# Patient Record
Sex: FEMALE | Race: WHITE | NOT HISPANIC OR LATINO | Employment: OTHER | URBAN - METROPOLITAN AREA
[De-identification: names, ages, dates, MRNs, and addresses within clinical notes are randomized per-mention and may not be internally consistent; named-entity substitution may affect disease eponyms.]

---

## 2017-01-22 RX ORDER — LORAZEPAM 1 MG/1
TABLET ORAL
Qty: 60 TABLET | Refills: 0 | Status: SHIPPED | OUTPATIENT
Start: 2017-01-22 | End: 2017-03-23 | Stop reason: SDUPTHER

## 2017-02-12 RX ORDER — ALPRAZOLAM 0.25 MG/1
TABLET ORAL
Qty: 60 TABLET | Refills: 0 | Status: SHIPPED | OUTPATIENT
Start: 2017-02-12 | End: 2017-04-21 | Stop reason: SDUPTHER

## 2017-03-05 ENCOUNTER — PATIENT MESSAGE (OUTPATIENT)
Dept: FAMILY MEDICINE | Facility: CLINIC | Age: 82
End: 2017-03-05

## 2017-03-06 ENCOUNTER — OFFICE VISIT (OUTPATIENT)
Dept: FAMILY MEDICINE | Facility: CLINIC | Age: 82
End: 2017-03-06
Payer: MEDICARE

## 2017-03-06 VITALS
DIASTOLIC BLOOD PRESSURE: 74 MMHG | WEIGHT: 118.38 LBS | HEART RATE: 73 BPM | HEIGHT: 61 IN | SYSTOLIC BLOOD PRESSURE: 128 MMHG | TEMPERATURE: 98 F | OXYGEN SATURATION: 98 % | BODY MASS INDEX: 22.35 KG/M2

## 2017-03-06 DIAGNOSIS — R42 DIZZY: Primary | ICD-10-CM

## 2017-03-06 DIAGNOSIS — F32.A DEPRESSION, UNSPECIFIED DEPRESSION TYPE: ICD-10-CM

## 2017-03-06 PROCEDURE — 99213 OFFICE O/P EST LOW 20 MIN: CPT | Mod: S$GLB,,, | Performed by: FAMILY MEDICINE

## 2017-03-06 RX ORDER — MIRTAZAPINE 15 MG/1
TABLET, FILM COATED ORAL
Qty: 30 TABLET | Refills: 11 | Status: SHIPPED | OUTPATIENT
Start: 2017-03-06 | End: 2017-06-27

## 2017-03-10 ENCOUNTER — PATIENT MESSAGE (OUTPATIENT)
Dept: FAMILY MEDICINE | Facility: CLINIC | Age: 82
End: 2017-03-10

## 2017-03-11 NOTE — PROGRESS NOTES
Patient ID: Irma Pedroza is a 89 y.o. female.    Chief Complaint: Diabetes; Dizziness; and Depression    HPI       Irma Pedroza is a 89 y.o. female here BC felt dizzy for a day or two and feels better now.  Hx of vertigo occ.  Pt with depression and decreased appetite.  Pt with fear of hypoglycemia.        Review of Symptoms    Constitutional  Neg activity change, No chills/ fever   Resp  Neg hemoptysis, stridor, choking  CVS  Neg chest pain, palpitations    Physical Exam    Constitutional:   Oriented to person, place, and time.appears well-developed and well-nourished.   No distress.     HENT:   Head: Normocephalic and atraumatic.     Right Ear: Tympanic membrane, external ear and ear canal normal     Left Ear: Tympanic membrane, external ear and ear canal normal    Nose: External Normal. Normal turbinates, No rhinorrhea     Mouth/Throat: Uvula is midline, oropharynx is clear and moist and mucous membranes are normal.     Neck: supple no anterior cervical adenopathy(Unless checked) (except below)  [] Anterior Cervical Adenopathy    Carotid artery:  Bruit    NEG []   POS[]    Eyes:   Conjunctivae are normal. Right eye exhibits no discharge. Left eye exhibits no discharge. No scleral icterus. No periorbital edema       Cardiovascular:  Regular rate, Regular rhythm     No extrasystole  Normal S1-S2    Pulmonary/Chest:   Normal effort and breath sounds.  No wheezing, rhonchi or rales.      Musculoskeletal:  No edema. No obvious deformity No waisting     Neurological:   Alert and oriented to person, place, and time. Coordination normal.     Skin:   Skin is warm and dry.   No diaphoresis.   No rash noted.    Psychiatric: Normal mood and affect. Behavior is normal. Judgment and thought content normal.       Assessment / Plan:      ICD-10-CM ICD-9-CM   1. Dizzy R42 780.4   2. Depression, unspecified depression type F32.9 311     Dizzy    Depression, unspecified depression type    Other orders  -     canagliflozin  (INVOKANA) 300 mg Tab tablet; Take 1 tablet (300 mg total) by mouth once daily.  Dispense: 30 tablet; Refill: 2  -     mirtazapine (REMERON) 15 MG tablet; Use one each night for sleep and depression  Dispense: 30 tablet; Refill: 11      Reduce lantus and start invokana

## 2017-03-16 ENCOUNTER — PATIENT MESSAGE (OUTPATIENT)
Dept: FAMILY MEDICINE | Facility: CLINIC | Age: 82
End: 2017-03-16

## 2017-03-16 DIAGNOSIS — R53.81 DEBILITY: ICD-10-CM

## 2017-03-16 DIAGNOSIS — R53.1 WEAKNESS: Primary | ICD-10-CM

## 2017-03-17 ENCOUNTER — PATIENT MESSAGE (OUTPATIENT)
Dept: FAMILY MEDICINE | Facility: CLINIC | Age: 82
End: 2017-03-17

## 2017-03-23 RX ORDER — LORAZEPAM 1 MG/1
TABLET ORAL
Qty: 60 TABLET | Refills: 0 | Status: SHIPPED | OUTPATIENT
Start: 2017-03-23 | End: 2017-05-22 | Stop reason: SDUPTHER

## 2017-04-04 RX ORDER — LINAGLIPTIN 5 MG/1
TABLET, FILM COATED ORAL
Qty: 90 TABLET | Refills: 3 | Status: SHIPPED | OUTPATIENT
Start: 2017-04-04

## 2017-04-21 RX ORDER — ALPRAZOLAM 0.25 MG/1
TABLET ORAL
Qty: 60 TABLET | Refills: 0 | Status: SHIPPED | OUTPATIENT
Start: 2017-04-21 | End: 2017-06-21 | Stop reason: SDUPTHER

## 2017-05-22 RX ORDER — LORAZEPAM 1 MG/1
TABLET ORAL
Qty: 60 TABLET | Refills: 3 | Status: SHIPPED | OUTPATIENT
Start: 2017-05-22

## 2017-06-01 RX ORDER — CANAGLIFLOZIN 300 MG/1
TABLET, FILM COATED ORAL
Qty: 30 TABLET | Refills: 11 | Status: SHIPPED | OUTPATIENT
Start: 2017-06-01 | End: 2017-07-18

## 2017-06-21 ENCOUNTER — PATIENT MESSAGE (OUTPATIENT)
Dept: FAMILY MEDICINE | Facility: CLINIC | Age: 82
End: 2017-06-21

## 2017-06-21 DIAGNOSIS — E11.9 TYPE 2 DIABETES MELLITUS WITHOUT COMPLICATION, WITH LONG-TERM CURRENT USE OF INSULIN: ICD-10-CM

## 2017-06-21 DIAGNOSIS — Z79.4 TYPE 2 DIABETES MELLITUS WITHOUT COMPLICATION, WITH LONG-TERM CURRENT USE OF INSULIN: ICD-10-CM

## 2017-06-21 DIAGNOSIS — E03.9 HYPOTHYROIDISM (ACQUIRED): ICD-10-CM

## 2017-06-21 DIAGNOSIS — I10 ESSENTIAL HYPERTENSION: Primary | ICD-10-CM

## 2017-06-21 RX ORDER — ALPRAZOLAM 0.25 MG/1
TABLET ORAL
Qty: 60 TABLET | Refills: 0 | Status: SHIPPED | OUTPATIENT
Start: 2017-06-21 | End: 2017-07-21 | Stop reason: SDUPTHER

## 2017-06-22 DIAGNOSIS — Z79.4 DIABETES MELLITUS, TYPE II, INSULIN DEPENDENT: Primary | ICD-10-CM

## 2017-06-22 DIAGNOSIS — E11.9 DIABETES MELLITUS, TYPE II, INSULIN DEPENDENT: Primary | ICD-10-CM

## 2017-06-22 DIAGNOSIS — E11.9 DIABETES MELLITUS WITHOUT COMPLICATION: Primary | ICD-10-CM

## 2017-06-23 ENCOUNTER — LAB VISIT (OUTPATIENT)
Dept: LAB | Facility: HOSPITAL | Age: 82
End: 2017-06-23
Attending: FAMILY MEDICINE
Payer: MEDICARE

## 2017-06-23 DIAGNOSIS — Z79.4 DIABETES MELLITUS, TYPE II, INSULIN DEPENDENT: ICD-10-CM

## 2017-06-23 DIAGNOSIS — E03.9 HYPOTHYROIDISM (ACQUIRED): ICD-10-CM

## 2017-06-23 DIAGNOSIS — Z79.4 TYPE 2 DIABETES MELLITUS WITHOUT COMPLICATION, WITH LONG-TERM CURRENT USE OF INSULIN: ICD-10-CM

## 2017-06-23 DIAGNOSIS — I10 ESSENTIAL HYPERTENSION: ICD-10-CM

## 2017-06-23 DIAGNOSIS — E11.9 DIABETES MELLITUS, TYPE II, INSULIN DEPENDENT: ICD-10-CM

## 2017-06-23 DIAGNOSIS — E11.9 TYPE 2 DIABETES MELLITUS WITHOUT COMPLICATION, WITH LONG-TERM CURRENT USE OF INSULIN: ICD-10-CM

## 2017-06-23 LAB
ANION GAP SERPL CALC-SCNC: 10 MMOL/L
BUN SERPL-MCNC: 44 MG/DL
CALCIUM SERPL-MCNC: 9.3 MG/DL
CHLORIDE SERPL-SCNC: 108 MMOL/L
CHOLEST/HDLC SERPL: 3 {RATIO}
CO2 SERPL-SCNC: 24 MMOL/L
CREAT SERPL-MCNC: 0.78 MG/DL
EST. GFR  (AFRICAN AMERICAN): >60 ML/MIN/1.73 M^2
EST. GFR  (NON AFRICAN AMERICAN): >60 ML/MIN/1.73 M^2
ESTIMATED AVG GLUCOSE: 249 MG/DL
GLUCOSE SERPL-MCNC: 131 MG/DL
HBA1C MFR BLD HPLC: 10.3 %
HDL/CHOLESTEROL RATIO: 33.5 %
HDLC SERPL-MCNC: 179 MG/DL
HDLC SERPL-MCNC: 60 MG/DL
LDLC SERPL CALC-MCNC: 100.4 MG/DL
NONHDLC SERPL-MCNC: 119 MG/DL
POTASSIUM SERPL-SCNC: 4.2 MMOL/L
SODIUM SERPL-SCNC: 142 MMOL/L
T4 FREE SERPL-MCNC: 1.23 NG/DL
TRIGL SERPL-MCNC: 93 MG/DL
TSH SERPL DL<=0.005 MIU/L-ACNC: 1.8 UIU/ML

## 2017-06-23 PROCEDURE — 36415 COLL VENOUS BLD VENIPUNCTURE: CPT | Mod: PO

## 2017-06-23 PROCEDURE — 83036 HEMOGLOBIN GLYCOSYLATED A1C: CPT | Mod: PO

## 2017-06-23 PROCEDURE — 80048 BASIC METABOLIC PNL TOTAL CA: CPT | Mod: PO

## 2017-06-23 PROCEDURE — 80061 LIPID PANEL: CPT

## 2017-06-23 PROCEDURE — 84443 ASSAY THYROID STIM HORMONE: CPT

## 2017-06-23 PROCEDURE — 84439 ASSAY OF FREE THYROXINE: CPT

## 2017-06-27 ENCOUNTER — OFFICE VISIT (OUTPATIENT)
Dept: FAMILY MEDICINE | Facility: CLINIC | Age: 82
End: 2017-06-27
Payer: MEDICARE

## 2017-06-27 VITALS
TEMPERATURE: 98 F | DIASTOLIC BLOOD PRESSURE: 40 MMHG | SYSTOLIC BLOOD PRESSURE: 120 MMHG | OXYGEN SATURATION: 96 % | WEIGHT: 109.81 LBS | HEIGHT: 61 IN | BODY MASS INDEX: 20.73 KG/M2 | RESPIRATION RATE: 18 BRPM | HEART RATE: 70 BPM

## 2017-06-27 DIAGNOSIS — R63.4 WEIGHT LOSS: ICD-10-CM

## 2017-06-27 DIAGNOSIS — I10 ESSENTIAL HYPERTENSION: ICD-10-CM

## 2017-06-27 DIAGNOSIS — E03.9 HYPOTHYROIDISM (ACQUIRED): ICD-10-CM

## 2017-06-27 DIAGNOSIS — E11.9 TYPE 2 DIABETES MELLITUS WITHOUT COMPLICATION, WITH LONG-TERM CURRENT USE OF INSULIN: Primary | ICD-10-CM

## 2017-06-27 DIAGNOSIS — Z79.4 TYPE 2 DIABETES MELLITUS WITHOUT COMPLICATION, WITH LONG-TERM CURRENT USE OF INSULIN: Primary | ICD-10-CM

## 2017-06-27 PROCEDURE — 1159F MED LIST DOCD IN RCRD: CPT | Mod: S$GLB,,, | Performed by: FAMILY MEDICINE

## 2017-06-27 PROCEDURE — 1126F AMNT PAIN NOTED NONE PRSNT: CPT | Mod: S$GLB,,, | Performed by: FAMILY MEDICINE

## 2017-06-27 PROCEDURE — 99213 OFFICE O/P EST LOW 20 MIN: CPT | Mod: S$GLB,,, | Performed by: FAMILY MEDICINE

## 2017-06-28 ENCOUNTER — PATIENT MESSAGE (OUTPATIENT)
Dept: FAMILY MEDICINE | Facility: CLINIC | Age: 82
End: 2017-06-28

## 2017-06-30 ENCOUNTER — PATIENT MESSAGE (OUTPATIENT)
Dept: FAMILY MEDICINE | Facility: CLINIC | Age: 82
End: 2017-06-30

## 2017-07-01 ENCOUNTER — PATIENT MESSAGE (OUTPATIENT)
Dept: FAMILY MEDICINE | Facility: CLINIC | Age: 82
End: 2017-07-01

## 2017-07-01 DIAGNOSIS — Z79.4 TYPE 2 DIABETES MELLITUS WITHOUT COMPLICATION, WITH LONG-TERM CURRENT USE OF INSULIN: Primary | ICD-10-CM

## 2017-07-01 DIAGNOSIS — F41.9 ANXIETY: ICD-10-CM

## 2017-07-01 DIAGNOSIS — E11.9 TYPE 2 DIABETES MELLITUS WITHOUT COMPLICATION, WITH LONG-TERM CURRENT USE OF INSULIN: Primary | ICD-10-CM

## 2017-07-01 DIAGNOSIS — R26.9 GAIT DISTURBANCE: ICD-10-CM

## 2017-07-01 DIAGNOSIS — R63.4 WEIGHT LOSS: ICD-10-CM

## 2017-07-02 ENCOUNTER — PATIENT MESSAGE (OUTPATIENT)
Dept: FAMILY MEDICINE | Facility: CLINIC | Age: 82
End: 2017-07-02

## 2017-07-04 NOTE — PROGRESS NOTES
Patient ID: Irma Pedroza is a 89 y.o. female.    Chief Complaint: Follow-up    HPI      Irma Pedroza is a 89 y.o. female here following up on diabetes which is not fully controlled because hemoglobin A1c is elevated.  However patient has some episodes of hypoglycemia.  Not predicted easily.  Patient with significant weight loss recently.  Some more rapid weight loss can be associated same time she started Invokana.            Review of Symptoms    Constitutional  not really anytivity change, No chills /fever   Resp  Neg hemoptysis, stridor, choking  CVS  Neg chest pain, palpitations    Physical Exam    Constitutional:  Oriented to person, place, and time.appears well-developed and well-nourished.  No distress.    appears thinner     HENT  Head: Normocephalic and atraumatic  Right Ear: External ear normal.   Left Ear: External ear normal.   Nose: External nose normal.   Mouth:  Moist mucus membranes.    Eyes:  Conjunctivae are normal. Right eye exhibits no discharge.  Left eye exhibits no discharge. No scleral icterus.  No periorbital edema    Cardiovascular:  Regular rate, Regular rhythm     No extrasystole  Normal S1-S2      Pulmonary/Chest:   Normal effort and breath sounds.  No wheezing, rhonchi or rales.    Musculoskeletal:  No edema. No obvious deformity No waisting       Neurological:  Alert and oriented to person, place, and time.   Coordination normal.     Skin:   Skin is warm and dry.  No diaphoresis.   No rash noted.     Psychiatric: Normal mood and affect. Behavior is normal.  Judgment and thought content normal.       Assessment / Plan:      ICD-10-CM ICD-9-CM   1. Type 2 diabetes mellitus without complication, with long-term current use of insulin E11.9 250.00    Z79.4 V58.67   2. Hypothyroidism (acquired) E03.9 244.9   3. Essential hypertension I10 401.9   4. Weight loss R63.4 783.21     Type 2 diabetes mellitus without complication, with long-term current use of insulin    Hypothyroidism  (acquired)    Essential hypertension    Weight loss      Not sure if weight loss is due to diabetes out of control or Invokana-STOP INVOKANA for the time being follow-up in one month-weekly weights-add Glucerna between meals

## 2017-07-18 ENCOUNTER — OFFICE VISIT (OUTPATIENT)
Dept: FAMILY MEDICINE | Facility: CLINIC | Age: 82
End: 2017-07-18
Payer: MEDICARE

## 2017-07-18 VITALS
TEMPERATURE: 98 F | SYSTOLIC BLOOD PRESSURE: 128 MMHG | WEIGHT: 114.88 LBS | HEIGHT: 61 IN | OXYGEN SATURATION: 97 % | BODY MASS INDEX: 21.69 KG/M2 | DIASTOLIC BLOOD PRESSURE: 70 MMHG | HEART RATE: 65 BPM

## 2017-07-18 DIAGNOSIS — R63.4 WEIGHT LOSS: ICD-10-CM

## 2017-07-18 DIAGNOSIS — Z79.4 TYPE 2 DIABETES MELLITUS WITHOUT COMPLICATION, WITH LONG-TERM CURRENT USE OF INSULIN: Primary | ICD-10-CM

## 2017-07-18 DIAGNOSIS — E03.9 HYPOTHYROIDISM (ACQUIRED): ICD-10-CM

## 2017-07-18 DIAGNOSIS — E11.9 TYPE 2 DIABETES MELLITUS WITHOUT COMPLICATION, WITH LONG-TERM CURRENT USE OF INSULIN: Primary | ICD-10-CM

## 2017-07-18 DIAGNOSIS — F41.9 ANXIETY: ICD-10-CM

## 2017-07-18 PROCEDURE — 1159F MED LIST DOCD IN RCRD: CPT | Mod: S$GLB,,, | Performed by: FAMILY MEDICINE

## 2017-07-18 PROCEDURE — 1125F AMNT PAIN NOTED PAIN PRSNT: CPT | Mod: S$GLB,,, | Performed by: FAMILY MEDICINE

## 2017-07-18 PROCEDURE — 99213 OFFICE O/P EST LOW 20 MIN: CPT | Mod: S$GLB,,, | Performed by: FAMILY MEDICINE

## 2017-07-18 RX ORDER — MIRTAZAPINE 15 MG/1
15 TABLET, FILM COATED ORAL NIGHTLY
COMMUNITY

## 2017-07-21 RX ORDER — ALPRAZOLAM 0.25 MG/1
TABLET ORAL
Qty: 60 TABLET | Refills: 0 | Status: SHIPPED | OUTPATIENT
Start: 2017-07-21

## 2017-07-21 RX ORDER — BLOOD SUGAR DIAGNOSTIC
STRIP MISCELLANEOUS
Qty: 300 STRIP | Refills: 0 | Status: SHIPPED | OUTPATIENT
Start: 2017-07-21

## 2017-07-23 NOTE — PROGRESS NOTES
Patient ID: Irma Pedroza is a 89 y.o. female.    Chief Complaint: Follow-up (3 week f/u)    HPI      Irma Pedroza is a 89 y.o. female here for follow-up after 3 weeks.  Patient with weight loss elevated hemoglobin A1c.  Discussed with she and her son last visit unsure why she's having weight loss and anorexia.  Wonder if new medicine is causing problem or out-of-control diabetes..  Stopped Invokana -glucoses been approximately 60s to 70s each a.m.  Patient doing really well without hypoglycemia.  Anxiety-controlled  Hypothyroidism-on Synthroid without problems    Overall feeling better  Weight increased.    Review of Symptoms    Constitutional  Neg activity change, No chills /fever   Resp  Neg hemoptysis, stridor, choking  CVS  Neg chest pain, palpitations    Physical Exam    Constitutional:  Oriented to person, place, and time.appears well-developed and well-nourished.  Better than stated age  No distress.    Uses walker for ambulation    HENT  Head: Normocephalic and atraumatic  Right Ear: External ear normal.   Left Ear: External ear normal.   Nose: External nose normal.   Mouth:  Moist mucus membranes.    Eyes:  Conjunctivae are normal. Right eye exhibits no discharge.  Left eye exhibits no discharge. No scleral icterus.  No periorbital edema    Cardiovascular:  Regular rate, Regular rhythm     No extrasystole  Normal S1-S2      Pulmonary/Chest:   Normal effort and breath sounds.  No wheezing, rhonchi or rales.    Musculoskeletal:  No edema. No obvious deformity No waisting       Neurological:  Alert and oriented to person, place, and time.   Coordination normal.     Skin:   Skin is warm and dry.  No diaphoresis.   No rash noted.     Psychiatric: Normal mood and affect. Behavior is normal.  Judgment and thought content normal.       Assessment / Plan:      ICD-10-CM ICD-9-CM   1. Type 2 diabetes mellitus without complication, with long-term current use of insulin E11.9 250.00    Z79.4 V58.67   2. Weight  loss R63.4 783.21   3. Anxiety F41.9 300.00   4. Hypothyroidism (acquired) E03.9 244.9     Type 2 diabetes mellitus without complication, with long-term current use of insulin    Weight loss    Anxiety    Hypothyroidism (acquired)     at this time doing well off current meds-continue current regimen-check glucose periodically  Anxiety-no changes  Hypothyroidism-no changes  Recheck hemoglobin A1c in approximately 3 months call with his any changes or problems.

## 2017-08-14 ENCOUNTER — HOSPITAL ENCOUNTER (INPATIENT)
Facility: HOSPITAL | Age: 82
LOS: 3 days | Discharge: NURSING, REHAB OR LONG-TERM CARE | DRG: 280 | End: 2017-08-17
Attending: EMERGENCY MEDICINE | Admitting: INTERNAL MEDICINE
Payer: MEDICARE

## 2017-08-14 DIAGNOSIS — I21.3 STEMI (ST ELEVATION MYOCARDIAL INFARCTION): ICD-10-CM

## 2017-08-14 DIAGNOSIS — I21.3 ST ELEVATION MYOCARDIAL INFARCTION (STEMI), UNSPECIFIED ARTERY: Primary | ICD-10-CM

## 2017-08-14 DIAGNOSIS — E11.59 TYPE 2 DIABETES MELLITUS WITH OTHER CIRCULATORY COMPLICATION: ICD-10-CM

## 2017-08-14 DIAGNOSIS — F41.9 ANXIETY: ICD-10-CM

## 2017-08-14 DIAGNOSIS — R57.0 CARDIOGENIC SHOCK: ICD-10-CM

## 2017-08-14 LAB
ABO + RH BLD: NORMAL
ALBUMIN SERPL BCP-MCNC: 2.8 G/DL
ALP SERPL-CCNC: 66 U/L
ALT SERPL W/O P-5'-P-CCNC: 68 U/L
ANION GAP SERPL CALC-SCNC: 10 MMOL/L
AORTIC VALVE REGURGITATION: ABNORMAL
APTT BLDCRRT: 35.3 SEC
AST SERPL-CCNC: 96 U/L
BASOPHILS # BLD AUTO: 0.02 K/UL
BASOPHILS NFR BLD: 0.3 %
BILIRUB SERPL-MCNC: 0.4 MG/DL
BLD GP AB SCN CELLS X3 SERPL QL: NORMAL
BUN SERPL-MCNC: 26 MG/DL
CALCIUM SERPL-MCNC: 8.1 MG/DL
CHLORIDE SERPL-SCNC: 107 MMOL/L
CO2 SERPL-SCNC: 20 MMOL/L
CREAT SERPL-MCNC: 0.9 MG/DL
DIASTOLIC DYSFUNCTION: YES
DIFFERENTIAL METHOD: ABNORMAL
EOSINOPHIL # BLD AUTO: 0.1 K/UL
EOSINOPHIL NFR BLD: 0.7 %
ERYTHROCYTE [DISTWIDTH] IN BLOOD BY AUTOMATED COUNT: 12.8 %
EST. GFR  (AFRICAN AMERICAN): >60 ML/MIN/1.73 M^2
EST. GFR  (NON AFRICAN AMERICAN): 56 ML/MIN/1.73 M^2
ESTIMATED PA SYSTOLIC PRESSURE: 51.16
GLUCOSE SERPL-MCNC: 239 MG/DL
HCT VFR BLD AUTO: 37.9 %
HGB BLD-MCNC: 12.8 G/DL
INR PPP: 1
LYMPHOCYTES # BLD AUTO: 1.7 K/UL
LYMPHOCYTES NFR BLD: 23.4 %
MCH RBC QN AUTO: 31.1 PG
MCHC RBC AUTO-ENTMCNC: 33.8 G/DL
MCV RBC AUTO: 92 FL
MITRAL VALVE MOBILITY: NORMAL
MITRAL VALVE REGURGITATION: ABNORMAL
MONOCYTES # BLD AUTO: 0.6 K/UL
MONOCYTES NFR BLD: 8.5 %
NEUTROPHILS # BLD AUTO: 4.8 K/UL
NEUTROPHILS NFR BLD: 66.8 %
PLATELET # BLD AUTO: 236 K/UL
PMV BLD AUTO: 10.6 FL
POCT GLUCOSE: 194 MG/DL (ref 70–110)
POCT GLUCOSE: 244 MG/DL (ref 70–110)
POTASSIUM SERPL-SCNC: 3.7 MMOL/L
PROT SERPL-MCNC: 5.4 G/DL
PROTHROMBIN TIME: 10.9 SEC
RBC # BLD AUTO: 4.12 M/UL
RETIRED EF AND QEF - SEE NOTES: 25 (ref 55–65)
SODIUM SERPL-SCNC: 137 MMOL/L
TRICUSPID VALVE REGURGITATION: ABNORMAL
TROPONIN I SERPL DL<=0.01 NG/ML-MCNC: 0.07 NG/ML
TROPONIN I SERPL DL<=0.01 NG/ML-MCNC: 4.04 NG/ML
WBC # BLD AUTO: 7.19 K/UL

## 2017-08-14 PROCEDURE — 63600175 PHARM REV CODE 636 W HCPCS: Performed by: NURSE PRACTITIONER

## 2017-08-14 PROCEDURE — 27000221 HC OXYGEN, UP TO 24 HOURS

## 2017-08-14 PROCEDURE — 84484 ASSAY OF TROPONIN QUANT: CPT | Mod: 91

## 2017-08-14 PROCEDURE — 25000003 PHARM REV CODE 250: Performed by: NURSE PRACTITIONER

## 2017-08-14 PROCEDURE — 96367 TX/PROPH/DG ADDL SEQ IV INF: CPT

## 2017-08-14 PROCEDURE — 93005 ELECTROCARDIOGRAM TRACING: CPT

## 2017-08-14 PROCEDURE — 85610 PROTHROMBIN TIME: CPT

## 2017-08-14 PROCEDURE — 93306 TTE W/DOPPLER COMPLETE: CPT | Mod: 26,,, | Performed by: INTERNAL MEDICINE

## 2017-08-14 PROCEDURE — 25000003 PHARM REV CODE 250: Performed by: EMERGENCY MEDICINE

## 2017-08-14 PROCEDURE — 94761 N-INVAS EAR/PLS OXIMETRY MLT: CPT

## 2017-08-14 PROCEDURE — 96365 THER/PROPH/DIAG IV INF INIT: CPT

## 2017-08-14 PROCEDURE — 83036 HEMOGLOBIN GLYCOSYLATED A1C: CPT

## 2017-08-14 PROCEDURE — A4216 STERILE WATER/SALINE, 10 ML: HCPCS | Performed by: NURSE PRACTITIONER

## 2017-08-14 PROCEDURE — 99223 1ST HOSP IP/OBS HIGH 75: CPT | Mod: ,,, | Performed by: INTERNAL MEDICINE

## 2017-08-14 PROCEDURE — 99291 CRITICAL CARE FIRST HOUR: CPT | Mod: 25

## 2017-08-14 PROCEDURE — 86850 RBC ANTIBODY SCREEN: CPT

## 2017-08-14 PROCEDURE — 93010 ELECTROCARDIOGRAM REPORT: CPT | Mod: ,,, | Performed by: INTERNAL MEDICINE

## 2017-08-14 PROCEDURE — 85730 THROMBOPLASTIN TIME PARTIAL: CPT

## 2017-08-14 PROCEDURE — 85025 COMPLETE CBC W/AUTO DIFF WBC: CPT

## 2017-08-14 PROCEDURE — 86900 BLOOD TYPING SEROLOGIC ABO: CPT

## 2017-08-14 PROCEDURE — 63600175 PHARM REV CODE 636 W HCPCS: Performed by: EMERGENCY MEDICINE

## 2017-08-14 PROCEDURE — 96375 TX/PRO/DX INJ NEW DRUG ADDON: CPT

## 2017-08-14 PROCEDURE — 93306 TTE W/DOPPLER COMPLETE: CPT

## 2017-08-14 PROCEDURE — 20000000 HC ICU ROOM

## 2017-08-14 PROCEDURE — 36415 COLL VENOUS BLD VENIPUNCTURE: CPT

## 2017-08-14 PROCEDURE — 84484 ASSAY OF TROPONIN QUANT: CPT

## 2017-08-14 PROCEDURE — 96366 THER/PROPH/DIAG IV INF ADDON: CPT

## 2017-08-14 PROCEDURE — 93010 ELECTROCARDIOGRAM REPORT: CPT | Mod: 77,,, | Performed by: INTERNAL MEDICINE

## 2017-08-14 PROCEDURE — 80053 COMPREHEN METABOLIC PANEL: CPT

## 2017-08-14 RX ORDER — MORPHINE SULFATE 2 MG/ML
2 INJECTION, SOLUTION INTRAMUSCULAR; INTRAVENOUS EVERY 4 HOURS PRN
Status: DISCONTINUED | OUTPATIENT
Start: 2017-08-14 | End: 2017-08-17 | Stop reason: HOSPADM

## 2017-08-14 RX ORDER — INSULIN ASPART 100 [IU]/ML
1-10 INJECTION, SOLUTION INTRAVENOUS; SUBCUTANEOUS
Status: DISCONTINUED | OUTPATIENT
Start: 2017-08-14 | End: 2017-08-17 | Stop reason: HOSPADM

## 2017-08-14 RX ORDER — ACETAMINOPHEN 325 MG/1
650 TABLET ORAL EVERY 8 HOURS PRN
Status: DISCONTINUED | OUTPATIENT
Start: 2017-08-14 | End: 2017-08-17 | Stop reason: HOSPADM

## 2017-08-14 RX ORDER — NOREPINEPHRINE BITARTRATE/D5W 16MG/250ML
PLASTIC BAG, INJECTION (ML) INTRAVENOUS
Status: DISPENSED
Start: 2017-08-14 | End: 2017-08-15

## 2017-08-14 RX ORDER — CLOPIDOGREL BISULFATE 75 MG/1
75 TABLET ORAL DAILY
Status: DISCONTINUED | OUTPATIENT
Start: 2017-08-15 | End: 2017-08-17 | Stop reason: HOSPADM

## 2017-08-14 RX ORDER — ASPIRIN 325 MG
325 TABLET ORAL
Status: COMPLETED | OUTPATIENT
Start: 2017-08-14 | End: 2017-08-14

## 2017-08-14 RX ORDER — POLYETHYLENE GLYCOL 3350 17 G/17G
17 POWDER, FOR SOLUTION ORAL 2 TIMES DAILY PRN
Status: DISCONTINUED | OUTPATIENT
Start: 2017-08-14 | End: 2017-08-17 | Stop reason: HOSPADM

## 2017-08-14 RX ORDER — GLUCAGON 1 MG
1 KIT INJECTION
Status: DISCONTINUED | OUTPATIENT
Start: 2017-08-14 | End: 2017-08-17 | Stop reason: HOSPADM

## 2017-08-14 RX ORDER — HYDROCODONE BITARTRATE AND ACETAMINOPHEN 5; 325 MG/1; MG/1
1 TABLET ORAL EVERY 4 HOURS PRN
Status: DISCONTINUED | OUTPATIENT
Start: 2017-08-14 | End: 2017-08-17 | Stop reason: HOSPADM

## 2017-08-14 RX ORDER — HEPARIN SODIUM,PORCINE/D5W 25000/250
16 INTRAVENOUS SOLUTION INTRAVENOUS CONTINUOUS
Status: DISCONTINUED | OUTPATIENT
Start: 2017-08-14 | End: 2017-08-15

## 2017-08-14 RX ORDER — ALPRAZOLAM 0.25 MG/1
0.25 TABLET ORAL 2 TIMES DAILY PRN
Status: DISCONTINUED | OUTPATIENT
Start: 2017-08-14 | End: 2017-08-17 | Stop reason: HOSPADM

## 2017-08-14 RX ORDER — PANTOPRAZOLE SODIUM 40 MG/1
40 TABLET, DELAYED RELEASE ORAL DAILY
Status: DISCONTINUED | OUTPATIENT
Start: 2017-08-14 | End: 2017-08-17 | Stop reason: HOSPADM

## 2017-08-14 RX ORDER — LEVOTHYROXINE SODIUM 75 UG/1
75 TABLET ORAL EVERY MORNING
Status: DISCONTINUED | OUTPATIENT
Start: 2017-08-15 | End: 2017-08-17 | Stop reason: HOSPADM

## 2017-08-14 RX ORDER — IBUPROFEN 200 MG
24 TABLET ORAL
Status: DISCONTINUED | OUTPATIENT
Start: 2017-08-14 | End: 2017-08-17 | Stop reason: HOSPADM

## 2017-08-14 RX ORDER — ASPIRIN 81 MG/1
81 TABLET ORAL DAILY
Status: DISCONTINUED | OUTPATIENT
Start: 2017-08-15 | End: 2017-08-17 | Stop reason: HOSPADM

## 2017-08-14 RX ORDER — NOREPINEPHRINE BITARTRATE/D5W 16MG/250ML
0.05 PLASTIC BAG, INJECTION (ML) INTRAVENOUS CONTINUOUS
Status: DISCONTINUED | OUTPATIENT
Start: 2017-08-14 | End: 2017-08-14

## 2017-08-14 RX ORDER — CLOPIDOGREL 300 MG/1
600 TABLET, FILM COATED ORAL
Status: DISCONTINUED | OUTPATIENT
Start: 2017-08-14 | End: 2017-08-14

## 2017-08-14 RX ORDER — ONDANSETRON 2 MG/ML
4 INJECTION INTRAMUSCULAR; INTRAVENOUS
Status: COMPLETED | OUTPATIENT
Start: 2017-08-14 | End: 2017-08-14

## 2017-08-14 RX ORDER — LOPERAMIDE HYDROCHLORIDE 2 MG/1
2 CAPSULE ORAL
Status: DISCONTINUED | OUTPATIENT
Start: 2017-08-14 | End: 2017-08-17 | Stop reason: HOSPADM

## 2017-08-14 RX ORDER — ATORVASTATIN CALCIUM 40 MG/1
40 TABLET, FILM COATED ORAL DAILY
Status: DISCONTINUED | OUTPATIENT
Start: 2017-08-14 | End: 2017-08-17 | Stop reason: HOSPADM

## 2017-08-14 RX ORDER — ONDANSETRON 2 MG/ML
4 INJECTION INTRAMUSCULAR; INTRAVENOUS EVERY 12 HOURS PRN
Status: DISCONTINUED | OUTPATIENT
Start: 2017-08-14 | End: 2017-08-17 | Stop reason: HOSPADM

## 2017-08-14 RX ORDER — SODIUM CHLORIDE 0.9 % (FLUSH) 0.9 %
3 SYRINGE (ML) INJECTION EVERY 8 HOURS
Status: DISCONTINUED | OUTPATIENT
Start: 2017-08-14 | End: 2017-08-17 | Stop reason: HOSPADM

## 2017-08-14 RX ORDER — CLOPIDOGREL BISULFATE 75 MG/1
600 TABLET ORAL
Status: COMPLETED | OUTPATIENT
Start: 2017-08-14 | End: 2017-08-14

## 2017-08-14 RX ORDER — AMIODARONE HYDROCHLORIDE 150 MG/3ML
300 INJECTION, SOLUTION INTRAVENOUS
Status: COMPLETED | OUTPATIENT
Start: 2017-08-14 | End: 2017-08-14

## 2017-08-14 RX ORDER — MIRTAZAPINE 15 MG/1
15 TABLET, FILM COATED ORAL NIGHTLY PRN
Status: DISCONTINUED | OUTPATIENT
Start: 2017-08-14 | End: 2017-08-17 | Stop reason: HOSPADM

## 2017-08-14 RX ORDER — IBUPROFEN 200 MG
16 TABLET ORAL
Status: DISCONTINUED | OUTPATIENT
Start: 2017-08-14 | End: 2017-08-17 | Stop reason: HOSPADM

## 2017-08-14 RX ADMIN — HEPARIN SODIUM AND DEXTROSE 16 UNITS/KG/HR: 10000; 5 INJECTION INTRAVENOUS at 02:08

## 2017-08-14 RX ADMIN — Medication 0.05 MCG/KG/MIN: at 12:08

## 2017-08-14 RX ADMIN — CLOPIDOGREL BISULFATE 600 MG: 75 TABLET ORAL at 12:08

## 2017-08-14 RX ADMIN — ONDANSETRON 4 MG: 2 INJECTION INTRAMUSCULAR; INTRAVENOUS at 12:08

## 2017-08-14 RX ADMIN — PANTOPRAZOLE SODIUM 40 MG: 40 TABLET, DELAYED RELEASE ORAL at 04:08

## 2017-08-14 RX ADMIN — INSULIN ASPART 1 UNITS: 100 INJECTION, SOLUTION INTRAVENOUS; SUBCUTANEOUS at 09:08

## 2017-08-14 RX ADMIN — ATORVASTATIN CALCIUM 40 MG: 40 TABLET, FILM COATED ORAL at 04:08

## 2017-08-14 RX ADMIN — ALPRAZOLAM 0.25 MG: 0.25 TABLET ORAL at 09:08

## 2017-08-14 RX ADMIN — AMIODARONE HYDROCHLORIDE 300 MG: 50 INJECTION, SOLUTION INTRAVENOUS at 01:08

## 2017-08-14 RX ADMIN — ASPIRIN 325 MG ORAL TABLET 325 MG: 325 PILL ORAL at 12:08

## 2017-08-14 RX ADMIN — SODIUM CHLORIDE, PRESERVATIVE FREE 3 ML: 5 INJECTION INTRAVENOUS at 09:08

## 2017-08-14 RX ADMIN — ONDANSETRON 4 MG: 2 INJECTION INTRAMUSCULAR; INTRAVENOUS at 04:08

## 2017-08-14 NOTE — HOSPITAL COURSE
Treated in ED - Plavix and aspirin.   Heparin.  Norepinephrine administered for hypotension.  NSVT on monitoring.  Amiodarone IV.      Long discussion with patient regarding options for treatment.  She is confused, likley due to AMS with shock.  Spoke with ERLINDAK - her son, who had also spoken with patient.  After discussing options of emergency cardiac cath vs. Medical management, he feels that she would not desire to proceed with invasive treatments at her age and in her condition, understands high risk of death - 50-70% mortality with shock.    Patient admitted to ICU for further monitoring and stabilization.  TTE shows EF 30% with anterior/apical akinesis.      08/15/2017 No significant events overnight. SR on telemetry with self limiting isolated runs of VT 3-6 beats. Heparin gtt continued. No chest pain, SOB, or nausea. SBP 82-110s. Troponin up to 27 on last check.     08/16/2017 No VT per tele review and no further nausea, chest pain, diuresis. Heparin gtt discontinued yesterday and patient transferred to tele. PT consulted and ambulated patient. CM consulted for discharge planning; patient lives alone and family concerned regarding ability to care for herself. SBP improved in the 110s-130s, BB therapy initiated and tolerated thus far.     08/17/2017 SNF placement pending. VSS and tolerating medical management of CAD without significant side effects. No significant events overnight.

## 2017-08-14 NOTE — H&P
[facility]  Cardiology  History and Physical     Patient Name: Irma Pedroza  MRN: [id number]  Admission Date: 8/14/2017  Code Status: DNR   Attending Provider: Nicho Sandra MD   Primary Care Physician: Leonidas Potts MD  Principal Problem:ST elevation myocardial infarction (STEMI)    Patient information was obtained from patient and ER records.     Subjective:     Chief Complaint:  Nausea and vomiting     HPI:  Mrs. Pedroza is a 89 year-old female with no known prior CAD who developed severe nausea and vomiting and overall general malaise this morning at home.  She called 911, and upon arrival EMS did ECG which demonstrated anterior STEMI.  She was brought to [facility] ED.  Repeat ECG demonstrated anterior STEMI.  Patient denies chest pain.  BP was 66/40 initially upon arrival.      Past Medical History:   Diagnosis Date    Anxiety     Depression     Diabetes mellitus, type 2     Hyperlipidemia     Thyroid disease        Past Surgical History:   Procedure Laterality Date    BLADDER SUSPENSION      CATARACT EXTRACTION      HYSTERECTOMY      ORIF CONGENITAL HIP DISLOCATION         Review of patient's allergies indicates:  No Known Allergies    No current facility-administered medications on file prior to encounter.      Current Outpatient Prescriptions on File Prior to Encounter   Medication Sig    alprazolam (XANAX) 0.25 MG tablet TAKE 1 TABLET BY MOUTH TWICE DAILY AS NEEDED FOR ANXIETY    CONTOUR TEST STRIPS Strp USE TO TEST THREE TIMES DAILY    LANTUS SOLOSTAR 100 unit/mL (3 mL) InPn pen INJECT 30 UNITS EVERY MORNING    levothyroxine (SYNTHROID) 75 MCG tablet TAKE 1 TABLET BY MOUTH EVERY MORNING    lorazepam (ATIVAN) 1 MG tablet TAKE 1 TO 2 TABLETS BY MOUTH EVERY NIGHT AT BEDTIME    losartan (COZAAR) 50 MG tablet TAKE 1/2 TO 1 TABLET BY MOUTH EVERY DAY (Patient taking differently: TAKE 1 TABLET BY MOUTH EVERY DAY)    mirtazapine (REMERON) 15 MG tablet Take 15 mg by mouth  every evening.    multivitamin capsule Take 1 capsule by mouth once daily.    TRADJENTA 5 mg Tab tablet TAKE 1 TABLET BY MOUTH DAILY    vitamin D 1000 units Tab Take 1,000 mg by mouth once daily.     Family History     Problem Relation (Age of Onset)    Alzheimer's disease Mother    Cancer Father    Heart disease Mother        Social History Main Topics    Smoking status: Never Smoker    Smokeless tobacco: Not on file    Alcohol use No    Drug use: Unknown    Sexual activity: Not on file     Review of Systems   Constitution: Negative for diaphoresis, weakness, malaise/fatigue, weight gain and weight loss.   HENT: Negative for nosebleeds.    Eyes: Negative for visual disturbance.   Cardiovascular: Negative for chest pain, claudication, cyanosis, dyspnea on exertion, irregular heartbeat, leg swelling, near-syncope, orthopnea, palpitations, paroxysmal nocturnal dyspnea and syncope.   Respiratory: Negative for cough, hemoptysis, shortness of breath, sleep disturbances due to breathing, snoring, sputum production and wheezing.    Hematologic/Lymphatic: Negative for bleeding problem. Does not bruise/bleed easily.   Skin: Negative for poor wound healing and rash.   Musculoskeletal: Negative for myalgias.   Gastrointestinal: Positive for nausea and vomiting. Negative for heartburn, hematemesis, hematochezia and melena.   Neurological: Negative for dizziness, focal weakness, light-headedness and loss of balance.   Psychiatric/Behavioral: Negative for altered mental status, depression and memory loss.     Objective:     Vital Signs (Most Recent):  Temp: 97.6 °F (36.4 °C) (08/14/17 1525)  Pulse: 85 (08/14/17 1605)  Resp: 19 (08/14/17 1605)  BP: (!) 120/57 (08/14/17 1605)  SpO2: 97 % (08/14/17 1605) Vital Signs (24h Range):  Temp:  [97.4 °F (36.3 °C)-97.6 °F (36.4 °C)] 97.6 °F (36.4 °C)  Pulse:  [60-95] 85  Resp:  [14-19] 19  SpO2:  [88 %-100 %] 97 %  BP: ()/(39-73) 120/57     Weight: 53.5 kg (117 lb 15.1  oz)  Body mass index is 21.57 kg/m².    SpO2: 97 %  O2 Device (Oxygen Therapy): nasal cannula    No intake or output data in the 24 hours ending 08/14/17 1646    Lines/Drains/Airways     Peripheral Intravenous Line                 Peripheral IV - Single Lumen 08/14/17 1230 Left Antecubital less than 1 day         Peripheral IV - Single Lumen 08/14/17 1230 Right Antecubital less than 1 day                Physical Exam   Constitutional: She is oriented to person, place, and time. She appears well-developed and well-nourished. No distress. She is not intubated.   HENT:   Head: Atraumatic.   Neck: No JVD present.   Cardiovascular: Normal rate, regular rhythm, S1 normal, S2 normal, normal heart sounds and intact distal pulses.  PMI is not displaced.  Exam reveals no gallop, no S3, no S4, no distant heart sounds, no friction rub, no midsystolic click and no opening snap.    No murmur heard.  Pulses:       Carotid pulses are 2+ on the right side, and 2+ on the left side.       Radial pulses are 2+ on the right side, and 2+ on the left side.   Pulmonary/Chest: Effort normal and breath sounds normal. No accessory muscle usage. No apnea, no tachypnea and no bradypnea. She is not intubated. No respiratory distress. She has no decreased breath sounds. She has no wheezes. She has no rhonchi. She has no rales. She exhibits no tenderness.   Abdominal: Soft. Normal aorta and bowel sounds are normal. She exhibits no distension, no abdominal bruit, no pulsatile midline mass and no mass. There is no tenderness.   Musculoskeletal: She exhibits no edema.   Neurological: She is alert and oriented to person, place, and time.   Skin: Skin is warm. No rash noted. No erythema. No pallor.   Psychiatric: She has a normal mood and affect. Her behavior is normal. Judgment and thought content normal.   Vitals reviewed.      Significant Labs:   BMP:   Recent Labs  Lab 08/14/17  1227   *      K 3.7      CO2 20*   BUN 26    CREATININE 0.9   CALCIUM 8.1*   , CMP   Recent Labs  Lab 08/14/17  1227      K 3.7      CO2 20*   *   BUN 26   CREATININE 0.9   CALCIUM 8.1*   PROT 5.4*   ALBUMIN 2.8*   BILITOT 0.4   ALKPHOS 66   AST 96*   ALT 68*   ANIONGAP 10   ESTGFRAFRICA >60   EGFRNONAA 56*   , CBC   Recent Labs  Lab 08/14/17  1227   WBC 7.19   HGB 12.8   HCT 37.9      , INR   Recent Labs  Lab 08/14/17  1227   INR 1.0   , Lipid Panel No results for input(s): CHOL, HDL, LDLCALC, TRIG, CHOLHDL in the last 48 hours. and Troponin   Recent Labs  Lab 08/14/17  1227 08/14/17  1601   TROPONINI 0.068* 4.037*       Significant Imaging: Echocardiogram:   2D echo with color flow doppler:   Results for orders placed or performed during the hospital encounter of 08/14/17   2D echo with color flow doppler   Result Value Ref Range    EF 25 (A) 55 - 65    Mitral Valve Regurgitation MILD     Diastolic Dysfunction Yes (A)     Aortic Valve Regurgitation MODERATE (A)     Est. PA Systolic Pressure 51.16 (A)     Mitral Valve Mobility NORMAL     Tricuspid Valve Regurgitation TRIVIAL      Assessment and Plan:     DM (diabetes mellitus)     Will continue to monitor.  Add insulin if glucose elevated tomorrow.          * ST elevation myocardial infarction (STEMI)    Admit for anterior STEMI with shock.  Patient desires no invasive treatment.  Desires DNR/DNI.    Will monitor in ICU, trend cardiac enzymes, DAPT, high intensity statin.              VTE Risk Mitigation         Ordered     Medium Risk of VTE  Once      08/14/17 1524     Reason for no Mechanical VTE Prophylaxis  Once      08/14/17 1524          Nicho Sandra MD  Cardiology   [facility]

## 2017-08-14 NOTE — HPI
Mrs. Pedroza is a 89 year-old female with no known prior CAD who developed severe nausea and vomiting and overall general malaise this morning at home.  She called 911, and upon arrival EMS did ECG which demonstrated anterior STEMI.  She was brought to [facility] ED.  Repeat ECG demonstrated anterior STEMI.  Patient denies chest pain.  BP was 66/40 initially upon arrival.

## 2017-08-14 NOTE — NURSING
Pt transferred from ED by stretcher, monitor, 2l/NC pox 94%, heparin @ 16u/kg  and levo @ 1.5 mcg/kg/min infusing to bilateral PIV to the AC, no c/o pain/discomfort, does have c/o HA, but no CP or SOB, no s/s of distress, placed on ICU monitor, family at the bedside

## 2017-08-14 NOTE — ASSESSMENT & PLAN NOTE
Admit for anterior STEMI with shock.  Patient desires no invasive treatment.  Desires DNR/DNI.    Will monitor in ICU, trend cardiac enzymes, DAPT, high intensity statin.

## 2017-08-14 NOTE — ED TRIAGE NOTES
89 Y/O F with CC of emesis. EMS  Brought pt in. Upon arrival EKG shows STEMI. Pt reports was having N/V this morning after breakfast. Multiple episodes of emesis. Also complains of headache. Pt hypotensive. Pt provided with gown and instructed to change into it and remove all metal. Pt verbalized understanding. Patient on cardiac monitor, automatic blood pressure cuff and pulse oximeter. defib pads applied and conntected to defibrillator. Will continue to monitor.

## 2017-08-14 NOTE — ED PROVIDER NOTES
Encounter Date: 8/14/2017       History     Chief Complaint   Patient presents with    Emesis     c/o vomiting today. EMS reports initial bp of 60/37. Received 1L of NS and 4mg of zofran IV pta. STEMI noted on EKG on arrival     89 y/o F with pmhx of DM, HLD, anxiety and depression presents to ED with hypotension and n/v.  Pt woke up this morning feeling well, took her insulin then ate breakfast.  She began vomiting shortly after and called EMS.  Upon EMS arrival, pt was noted to have SBP of 60 and treated with IVF and zofran.  EMS was in route to [facility] when they were diverted here by ED MD.  During transport, EMS reports the patient had a change in her rhythm strip immediately pta.  EKG was done immediately upon arrival here and revealed STEMI.   Pt denies any chest pain, palpitations or sob.           Review of patient's allergies indicates:  No Known Allergies  Past Medical History:   Diagnosis Date    Anxiety     Depression     Diabetes mellitus, type 2     Hyperlipidemia     Thyroid disease      Past Surgical History:   Procedure Laterality Date    BLADDER SUSPENSION      CATARACT EXTRACTION      HYSTERECTOMY      ORIF CONGENITAL HIP DISLOCATION       Family History   Problem Relation Age of Onset    Heart disease Mother     Alzheimer's disease Mother     Cancer Father      Social History   Substance Use Topics    Smoking status: Never Smoker    Smokeless tobacco: Not on file    Alcohol use No     Review of Systems   Unable to perform ROS: Acuity of condition   Respiratory:        No sob   Cardiovascular:        No chest pain   Gastrointestinal: Negative for abdominal pain, nausea and vomiting.       Physical Exam     Initial Vitals   BP Pulse Resp Temp SpO2   -- -- -- -- --      MAP       --         Physical Exam    Nursing note and vitals reviewed.  Constitutional: She is not diaphoretic. No distress.   Pale, thin 89 y/o F nauseated and vomiting with hypotension.  No cp   HENT:   Head:  Normocephalic and atraumatic.   Eyes: EOM are normal.   Conjunctiva pale   Neck: Normal range of motion. Neck supple.   Cardiovascular: Normal rate, regular rhythm and normal heart sounds. Exam reveals no gallop and no friction rub.    No murmur heard.  Pulmonary/Chest: Breath sounds normal. No respiratory distress. She has no wheezes. She has no rhonchi. She has no rales.   Abdominal: Soft. Bowel sounds are normal. She exhibits no distension. There is no tenderness. There is no rebound and no guarding.   Musculoskeletal: Normal range of motion. She exhibits no edema.   Neurological: She is alert and oriented to person, place, and time.   Skin: Skin is warm and dry. There is pallor.   Turgor poor   Psychiatric: She has a normal mood and affect. Her behavior is normal. Judgment and thought content normal.         ED Course   Critical Care  Date/Time: 8/14/2017 1:13 PM  Performed by: MADY WILDE  Authorized by: MADY WILDE   Direct patient critical care time: 20 minutes  Additional history critical care time: 5 minutes  Ordering / reviewing critical care time: 10 minutes  Documentation critical care time: 5 minutes  Consulting other physicians critical care time: 5 minutes  Consult with family critical care time: 0 minutes  Total critical care time (exclusive of procedural time) : 45 minutes  Critical care time was exclusive of separately billable procedures and treating other patients and teaching time.  Critical care was necessary to treat or prevent imminent or life-threatening deterioration of the following conditions: cardiac failure.  Critical care was time spent personally by me on the following activities: discussions with consultants, evaluation of patient's response to treatment, obtaining history from patient or surrogate, ordering and review of laboratory studies, pulse oximetry, review of old charts, re-evaluation of patient's condition, ordering and performing treatments and  "interventions, examination of patient, interpretation of cardiac output measurements and development of treatment plan with patient or surrogate.        Labs Reviewed   CBC W/ AUTO DIFFERENTIAL   COMPREHENSIVE METABOLIC PANEL   PROTIME-INR   TROPONIN I   TYPE & SCREEN     EKG Readings: (Independently Interpreted)   Initial Reading: STEMI. Rhythm: Normal Sinus Rhythm. Heart Rate: 75. ST Segment Elevation: V2, V3, V1 and AVR. ST Segment Depression: II and AVF. Clinical Impression: Anterior STEMI   STEMI ON INITIAL ED EKG          Medical Decision Making:   Initial Assessment:   89 Y/O F PRESENTS WITH N/V AND HYPOTENSION WITH STEMI ON INITIAL ED EKG  Differential Diagnosis:   DDX: STEMI, DEHYDRATION, ARRHYTHMIA, CARDIOGENIC SHOCK, METABOLIC DERANGEMENT  Independently Interpreted Test(s):   I have ordered and independently interpreted EKG Reading(s) - see prior notes  ED Management:  DR. MARMOLEJO AT BEDSIDE IMMEDIATELY UPON CALLING CODE STEMI.  PT HAS RECEIVED 1 LITER OF IVF ON EMS AND CONTINUES TO HAVE A SBP OF 66.  A SECOND LITER OF IVF STARTED AS WELL AS LEVOPHED.  THE PATIENT REPORTS FEELING "LOUSY" AND NAUSEATED BUT DENIES ANY CP OR SOB.  PT HAS BEEN CONTINUOUSLY RE-EVALUATED AND HER SBP IS NOW 80.    1242:  AFTER MULTIPLE ATTEMPTS TO CONTACT HER SON PER THE PT WISHES, HE IS PRESENT IN ED SPEAKING WITH DR. MARMOLEJO REGARDING THE PROCEDURE.  1254:  PT HAD A RUN OF V-TACH IN ED.  DURING THIS EPISODE SHE HAD A PULSE AND WAS TALKING IN NO DISTRESS.  AMIODARONE 600MG IV ORDERED.    1258:  PT SON HAS HAD A LENGTHY DISCUSSION WITH DR. MARMOLEJO.  PT IS NOW DNR.    Other:   I have discussed this case with another health care provider.       <> Summary of the Discussion: DR. MARMOLEJO                   ED Course     Clinical Impression:   The primary encounter diagnosis was ST elevation myocardial infarction (STEMI), unspecified artery. A diagnosis of Cardiogenic shock was also pertinent to this visit.    Disposition:   Disposition: " Admitted  Condition: Critical                        Yessi Webber MD  08/14/17 1378

## 2017-08-14 NOTE — SUBJECTIVE & OBJECTIVE
Past Medical History:   Diagnosis Date    Anxiety     Depression     Diabetes mellitus, type 2     Hyperlipidemia     Thyroid disease        Past Surgical History:   Procedure Laterality Date    BLADDER SUSPENSION      CATARACT EXTRACTION      HYSTERECTOMY      ORIF CONGENITAL HIP DISLOCATION         Review of patient's allergies indicates:  No Known Allergies    No current facility-administered medications on file prior to encounter.      Current Outpatient Prescriptions on File Prior to Encounter   Medication Sig    alprazolam (XANAX) 0.25 MG tablet TAKE 1 TABLET BY MOUTH TWICE DAILY AS NEEDED FOR ANXIETY    CONTOUR TEST STRIPS Strp USE TO TEST THREE TIMES DAILY    LANTUS SOLOSTAR 100 unit/mL (3 mL) InPn pen INJECT 30 UNITS EVERY MORNING    levothyroxine (SYNTHROID) 75 MCG tablet TAKE 1 TABLET BY MOUTH EVERY MORNING    lorazepam (ATIVAN) 1 MG tablet TAKE 1 TO 2 TABLETS BY MOUTH EVERY NIGHT AT BEDTIME    losartan (COZAAR) 50 MG tablet TAKE 1/2 TO 1 TABLET BY MOUTH EVERY DAY (Patient taking differently: TAKE 1 TABLET BY MOUTH EVERY DAY)    mirtazapine (REMERON) 15 MG tablet Take 15 mg by mouth every evening.    multivitamin capsule Take 1 capsule by mouth once daily.    TRADJENTA 5 mg Tab tablet TAKE 1 TABLET BY MOUTH DAILY    vitamin D 1000 units Tab Take 1,000 mg by mouth once daily.     Family History     Problem Relation (Age of Onset)    Alzheimer's disease Mother    Cancer Father    Heart disease Mother        Social History Main Topics    Smoking status: Never Smoker    Smokeless tobacco: Not on file    Alcohol use No    Drug use: Unknown    Sexual activity: Not on file     Review of Systems   Constitution: Negative for diaphoresis, weakness, malaise/fatigue, weight gain and weight loss.   HENT: Negative for nosebleeds.    Eyes: Negative for visual disturbance.   Cardiovascular: Negative for chest pain, claudication, cyanosis, dyspnea on exertion, irregular heartbeat, leg swelling,  near-syncope, orthopnea, palpitations, paroxysmal nocturnal dyspnea and syncope.   Respiratory: Negative for cough, hemoptysis, shortness of breath, sleep disturbances due to breathing, snoring, sputum production and wheezing.    Hematologic/Lymphatic: Negative for bleeding problem. Does not bruise/bleed easily.   Skin: Negative for poor wound healing and rash.   Musculoskeletal: Negative for myalgias.   Gastrointestinal: Positive for nausea and vomiting. Negative for heartburn, hematemesis, hematochezia and melena.   Neurological: Negative for dizziness, focal weakness, light-headedness and loss of balance.   Psychiatric/Behavioral: Negative for altered mental status, depression and memory loss.     Objective:     Vital Signs (Most Recent):  Temp: 97.6 °F (36.4 °C) (08/14/17 1525)  Pulse: 85 (08/14/17 1605)  Resp: 19 (08/14/17 1605)  BP: (!) 120/57 (08/14/17 1605)  SpO2: 97 % (08/14/17 1605) Vital Signs (24h Range):  Temp:  [97.4 °F (36.3 °C)-97.6 °F (36.4 °C)] 97.6 °F (36.4 °C)  Pulse:  [60-95] 85  Resp:  [14-19] 19  SpO2:  [88 %-100 %] 97 %  BP: ()/(39-73) 120/57     Weight: 53.5 kg (117 lb 15.1 oz)  Body mass index is 21.57 kg/m².    SpO2: 97 %  O2 Device (Oxygen Therapy): nasal cannula    No intake or output data in the 24 hours ending 08/14/17 1646    Lines/Drains/Airways     Peripheral Intravenous Line                 Peripheral IV - Single Lumen 08/14/17 1230 Left Antecubital less than 1 day         Peripheral IV - Single Lumen 08/14/17 1230 Right Antecubital less than 1 day                Physical Exam   Constitutional: She is oriented to person, place, and time. She appears well-developed and well-nourished. No distress. She is not intubated.   HENT:   Head: Atraumatic.   Neck: No JVD present.   Cardiovascular: Normal rate, regular rhythm, S1 normal, S2 normal, normal heart sounds and intact distal pulses.  PMI is not displaced.  Exam reveals no gallop, no S3, no S4, no distant heart sounds, no  friction rub, no midsystolic click and no opening snap.    No murmur heard.  Pulses:       Carotid pulses are 2+ on the right side, and 2+ on the left side.       Radial pulses are 2+ on the right side, and 2+ on the left side.   Pulmonary/Chest: Effort normal and breath sounds normal. No accessory muscle usage. No apnea, no tachypnea and no bradypnea. She is not intubated. No respiratory distress. She has no decreased breath sounds. She has no wheezes. She has no rhonchi. She has no rales. She exhibits no tenderness.   Abdominal: Soft. Normal aorta and bowel sounds are normal. She exhibits no distension, no abdominal bruit, no pulsatile midline mass and no mass. There is no tenderness.   Musculoskeletal: She exhibits no edema.   Neurological: She is alert and oriented to person, place, and time.   Skin: Skin is warm. No rash noted. No erythema. No pallor.   Psychiatric: She has a normal mood and affect. Her behavior is normal. Judgment and thought content normal.   Vitals reviewed.      Significant Labs:   BMP:   Recent Labs  Lab 08/14/17  1227   *      K 3.7      CO2 20*   BUN 26   CREATININE 0.9   CALCIUM 8.1*   , CMP   Recent Labs  Lab 08/14/17  1227      K 3.7      CO2 20*   *   BUN 26   CREATININE 0.9   CALCIUM 8.1*   PROT 5.4*   ALBUMIN 2.8*   BILITOT 0.4   ALKPHOS 66   AST 96*   ALT 68*   ANIONGAP 10   ESTGFRAFRICA >60   EGFRNONAA 56*   , CBC   Recent Labs  Lab 08/14/17  1227   WBC 7.19   HGB 12.8   HCT 37.9      , INR   Recent Labs  Lab 08/14/17  1227   INR 1.0   , Lipid Panel No results for input(s): CHOL, HDL, LDLCALC, TRIG, CHOLHDL in the last 48 hours. and Troponin   Recent Labs  Lab 08/14/17  1227 08/14/17  1601   TROPONINI 0.068* 4.037*       Significant Imaging: Echocardiogram:   2D echo with color flow doppler:   Results for orders placed or performed during the hospital encounter of 08/14/17   2D echo with color flow doppler   Result Value Ref Range     EF 25 (A) 55 - 65    Mitral Valve Regurgitation MILD     Diastolic Dysfunction Yes (A)     Aortic Valve Regurgitation MODERATE (A)     Est. PA Systolic Pressure 51.16 (A)     Mitral Valve Mobility NORMAL     Tricuspid Valve Regurgitation TRIVIAL

## 2017-08-15 LAB
ALBUMIN SERPL BCP-MCNC: 2.6 G/DL
ALBUMIN SERPL BCP-MCNC: 2.6 G/DL
ALP SERPL-CCNC: 87 U/L
ALP SERPL-CCNC: 87 U/L
ALT SERPL W/O P-5'-P-CCNC: 198 U/L
ALT SERPL W/O P-5'-P-CCNC: 198 U/L
ANION GAP SERPL CALC-SCNC: 6 MMOL/L
ANION GAP SERPL CALC-SCNC: 6 MMOL/L
APTT BLDCRRT: 59.7 SEC
APTT BLDCRRT: 71.1 SEC
AST SERPL-CCNC: 246 U/L
AST SERPL-CCNC: 246 U/L
BASOPHILS # BLD AUTO: 0.02 K/UL
BASOPHILS NFR BLD: 0.2 %
BILIRUB SERPL-MCNC: 0.5 MG/DL
BILIRUB SERPL-MCNC: 0.5 MG/DL
BUN SERPL-MCNC: 28 MG/DL
BUN SERPL-MCNC: 28 MG/DL
CALCIUM SERPL-MCNC: 8.2 MG/DL
CALCIUM SERPL-MCNC: 8.2 MG/DL
CHLORIDE SERPL-SCNC: 108 MMOL/L
CHLORIDE SERPL-SCNC: 108 MMOL/L
CHOLEST/HDLC SERPL: 2.3 {RATIO}
CO2 SERPL-SCNC: 22 MMOL/L
CO2 SERPL-SCNC: 22 MMOL/L
CREAT SERPL-MCNC: 0.8 MG/DL
CREAT SERPL-MCNC: 0.8 MG/DL
DIFFERENTIAL METHOD: ABNORMAL
EOSINOPHIL # BLD AUTO: 0.1 K/UL
EOSINOPHIL NFR BLD: 0.9 %
ERYTHROCYTE [DISTWIDTH] IN BLOOD BY AUTOMATED COUNT: 13 %
EST. GFR  (AFRICAN AMERICAN): >60 ML/MIN/1.73 M^2
EST. GFR  (AFRICAN AMERICAN): >60 ML/MIN/1.73 M^2
EST. GFR  (NON AFRICAN AMERICAN): >60 ML/MIN/1.73 M^2
EST. GFR  (NON AFRICAN AMERICAN): >60 ML/MIN/1.73 M^2
ESTIMATED AVG GLUCOSE: 214 MG/DL
GLUCOSE SERPL-MCNC: 51 MG/DL
GLUCOSE SERPL-MCNC: 51 MG/DL
HBA1C MFR BLD HPLC: 9.1 %
HCT VFR BLD AUTO: 36.1 %
HDL/CHOLESTEROL RATIO: 43 %
HDLC SERPL-MCNC: 128 MG/DL
HDLC SERPL-MCNC: 55 MG/DL
HGB BLD-MCNC: 12.3 G/DL
LDLC SERPL CALC-MCNC: 66.4 MG/DL
LYMPHOCYTES # BLD AUTO: 2 K/UL
LYMPHOCYTES NFR BLD: 22.1 %
MCH RBC QN AUTO: 30.8 PG
MCHC RBC AUTO-ENTMCNC: 34.1 G/DL
MCV RBC AUTO: 91 FL
MONOCYTES # BLD AUTO: 0.6 K/UL
MONOCYTES NFR BLD: 7 %
NEUTROPHILS # BLD AUTO: 6.1 K/UL
NEUTROPHILS NFR BLD: 69.6 %
NONHDLC SERPL-MCNC: 73 MG/DL
PLATELET # BLD AUTO: 202 K/UL
PMV BLD AUTO: 10.5 FL
POCT GLUCOSE: 113 MG/DL (ref 70–110)
POCT GLUCOSE: 124 MG/DL (ref 70–110)
POCT GLUCOSE: 155 MG/DL (ref 70–110)
POCT GLUCOSE: 196 MG/DL (ref 70–110)
POTASSIUM SERPL-SCNC: 3.6 MMOL/L
POTASSIUM SERPL-SCNC: 3.6 MMOL/L
PROT SERPL-MCNC: 5.2 G/DL
PROT SERPL-MCNC: 5.2 G/DL
RBC # BLD AUTO: 3.99 M/UL
SODIUM SERPL-SCNC: 136 MMOL/L
SODIUM SERPL-SCNC: 136 MMOL/L
TRIGL SERPL-MCNC: 33 MG/DL
TROPONIN I SERPL DL<=0.01 NG/ML-MCNC: 17.64 NG/ML
TROPONIN I SERPL DL<=0.01 NG/ML-MCNC: 27.12 NG/ML
WBC # BLD AUTO: 8.81 K/UL

## 2017-08-15 PROCEDURE — 85025 COMPLETE CBC W/AUTO DIFF WBC: CPT

## 2017-08-15 PROCEDURE — A4216 STERILE WATER/SALINE, 10 ML: HCPCS | Performed by: NURSE PRACTITIONER

## 2017-08-15 PROCEDURE — 25000003 PHARM REV CODE 250: Performed by: NURSE PRACTITIONER

## 2017-08-15 PROCEDURE — 93005 ELECTROCARDIOGRAM TRACING: CPT

## 2017-08-15 PROCEDURE — 63600175 PHARM REV CODE 636 W HCPCS: Performed by: NURSE PRACTITIONER

## 2017-08-15 PROCEDURE — 85730 THROMBOPLASTIN TIME PARTIAL: CPT

## 2017-08-15 PROCEDURE — 93010 ELECTROCARDIOGRAM REPORT: CPT | Mod: 77,,, | Performed by: INTERNAL MEDICINE

## 2017-08-15 PROCEDURE — 93010 ELECTROCARDIOGRAM REPORT: CPT | Mod: ,,, | Performed by: INTERNAL MEDICINE

## 2017-08-15 PROCEDURE — 94761 N-INVAS EAR/PLS OXIMETRY MLT: CPT

## 2017-08-15 PROCEDURE — 36415 COLL VENOUS BLD VENIPUNCTURE: CPT

## 2017-08-15 PROCEDURE — 99232 SBSQ HOSP IP/OBS MODERATE 35: CPT | Mod: ,,, | Performed by: INTERNAL MEDICINE

## 2017-08-15 PROCEDURE — 80053 COMPREHEN METABOLIC PANEL: CPT

## 2017-08-15 PROCEDURE — 84484 ASSAY OF TROPONIN QUANT: CPT

## 2017-08-15 PROCEDURE — 11000001 HC ACUTE MED/SURG PRIVATE ROOM

## 2017-08-15 PROCEDURE — 80061 LIPID PANEL: CPT

## 2017-08-15 RX ADMIN — MIRTAZAPINE 15 MG: 15 TABLET, FILM COATED ORAL at 09:08

## 2017-08-15 RX ADMIN — DEXTROSE MONOHYDRATE 25 G: 25 INJECTION, SOLUTION INTRAVENOUS at 05:08

## 2017-08-15 RX ADMIN — SODIUM CHLORIDE, PRESERVATIVE FREE 3 ML: 5 INJECTION INTRAVENOUS at 04:08

## 2017-08-15 RX ADMIN — LEVOTHYROXINE SODIUM 75 MCG: 25 TABLET ORAL at 07:08

## 2017-08-15 RX ADMIN — SODIUM CHLORIDE, PRESERVATIVE FREE 3 ML: 5 INJECTION INTRAVENOUS at 09:08

## 2017-08-15 RX ADMIN — ATORVASTATIN CALCIUM 40 MG: 40 TABLET, FILM COATED ORAL at 09:08

## 2017-08-15 RX ADMIN — INSULIN ASPART 2 UNITS: 100 INJECTION, SOLUTION INTRAVENOUS; SUBCUTANEOUS at 05:08

## 2017-08-15 RX ADMIN — ASPIRIN 81 MG: 81 TABLET, COATED ORAL at 09:08

## 2017-08-15 RX ADMIN — INSULIN ASPART 1 UNITS: 100 INJECTION, SOLUTION INTRAVENOUS; SUBCUTANEOUS at 09:08

## 2017-08-15 RX ADMIN — SODIUM CHLORIDE, PRESERVATIVE FREE 3 ML: 5 INJECTION INTRAVENOUS at 07:08

## 2017-08-15 RX ADMIN — PANTOPRAZOLE SODIUM 40 MG: 40 TABLET, DELAYED RELEASE ORAL at 09:08

## 2017-08-15 RX ADMIN — CLOPIDOGREL BISULFATE 75 MG: 75 TABLET ORAL at 09:08

## 2017-08-15 NOTE — PLAN OF CARE
Problem: Patient Care Overview  Goal: Plan of Care Review  Outcome: Ongoing (interventions implemented as appropriate)  No respiratory distress noted at this time.

## 2017-08-15 NOTE — NURSING
No complaints of pain or SOB, pt on heparin, heparin not infusing, no previous PTT results on chart, PTT drawn at this time, PTT is 35.  No changes at this time, next PTT to be drawn at 0200.  Will make adjustments with the 2am results.

## 2017-08-15 NOTE — PROGRESS NOTES
[facility]  Cardiology  Progress Note    Patient Name: Irma Pedroza  MRN: [id number]  Admission Date: 8/14/2017  Hospital Length of Stay: 1 days  Code Status: DNR   Attending Physician: Nicho Sandra MD   Primary Care Physician: Leonidas Potts MD  Expected Discharge Date:   Principal Problem:ST elevation myocardial infarction (STEMI)    Subjective:     Hospital Course:   Treated in ED - Plavix and aspirin.   Heparin.  Norepinephrine administered for hypotension.  NSVT on monitoring.  Amiodarone IV.      Long discussion with patient regarding options for treatment.  She is confused, likley due to AMS with shock.  Spoke with NOK - her son, who had also spoken with patient.  After discussing options of emergency cardiac cath vs. Medical management, he feels that she would not desire to proceed with invasive treatments at her age and in her condition, understands high risk of death - 50-70% mortality with shock.    Patient admitted to ICU for further monitoring and stabilization.  TTE shows EF 30% with anterior/apical akinesis.      08/15/2017 No significant events overnight. SR on telemetry with self limiting isolated runs of VT 3-6 beats. Heparin gtt continued. No chest pain, SOB, or nausea. SBP 82-110s. Troponin up to 27 on last check.       Review of Systems   Constitution: Negative for diaphoresis and malaise/fatigue.   HENT: Negative.    Eyes: Negative.    Cardiovascular: Negative for chest pain, leg swelling, near-syncope, orthopnea, palpitations and paroxysmal nocturnal dyspnea.   Respiratory: Negative.    Endocrine: Negative.    Hematologic/Lymphatic: Negative.    Skin: Negative.    Musculoskeletal: Negative.    Gastrointestinal: Negative.  Negative for nausea and vomiting.   Genitourinary: Negative.    Neurological: Negative.    Psychiatric/Behavioral: Negative.  Negative for altered mental status.   Allergic/Immunologic: Negative.      Objective:     Vital Signs (Most  Recent):  Temp: 98 °F (36.7 °C) (08/15/17 0702)  Pulse: 70 (08/15/17 0823)  Resp: 15 (08/15/17 0823)  BP: (!) 119/59 (08/15/17 0800)  SpO2: 100 % (08/15/17 0823) Vital Signs (24h Range):  Temp:  [97.4 °F (36.3 °C)-99.4 °F (37.4 °C)] 98 °F (36.7 °C)  Pulse:  [60-95] 70  Resp:  [14-33] 15  SpO2:  [88 %-100 %] 100 %  BP: ()/(39-73) 119/59     Weight: 53.5 kg (117 lb 15.1 oz)  Body mass index is 21.57 kg/m².     SpO2: 100 %  O2 Device (Oxygen Therapy): room air      Intake/Output Summary (Last 24 hours) at 08/15/17 0909  Last data filed at 08/15/17 0800   Gross per 24 hour   Intake            53.06 ml   Output              101 ml   Net           -47.94 ml       Lines/Drains/Airways     Peripheral Intravenous Line                 Peripheral IV - Single Lumen 08/14/17 1230 Left Antecubital less than 1 day         Peripheral IV - Single Lumen 08/14/17 1230 Right Antecubital less than 1 day                Physical Exam   Constitutional: She is oriented to person, place, and time. She appears well-developed and well-nourished. No distress.   HENT:   Head: Normocephalic and atraumatic.   Eyes: Right eye exhibits no discharge. Left eye exhibits no discharge.   Neck: No JVD present.   Cardiovascular: Normal rate and regular rhythm.    Murmur heard.   Harsh midsystolic murmur is present with a grade of 3/6  at the upper right sternal border radiating to the neck  Pulmonary/Chest: Effort normal and breath sounds normal.   Abdominal: Soft. Bowel sounds are normal.   Musculoskeletal: She exhibits no edema.   Neurological: She is alert and oriented to person, place, and time.   Skin: Skin is warm and dry. She is not diaphoretic.   Psychiatric: She has a normal mood and affect. Her behavior is normal. Judgment and thought content normal.       Significant Labs:   BMP:   Recent Labs  Lab 08/14/17  1227 08/15/17  0305   * 51*  51*    136  136   K 3.7 3.6  3.6    108  108   CO2 20* 22*  22*   BUN 26 28  28    CREATININE 0.9 0.8  0.8   CALCIUM 8.1* 8.2*  8.2*   , CMP   Recent Labs  Lab 08/14/17  1227 08/15/17  0305    136  136   K 3.7 3.6  3.6    108  108   CO2 20* 22*  22*   * 51*  51*   BUN 26 28  28   CREATININE 0.9 0.8  0.8   CALCIUM 8.1* 8.2*  8.2*   PROT 5.4* 5.2*  5.2*   ALBUMIN 2.8* 2.6*  2.6*   BILITOT 0.4 0.5  0.5   ALKPHOS 66 87  87   AST 96* 246*  246*   ALT 68* 198*  198*   ANIONGAP 10 6*  6*   ESTGFRAFRICA >60 >60  >60   EGFRNONAA 56* >60  >60   , CBC   Recent Labs  Lab 08/14/17  1227 08/15/17  0305   WBC 7.19 8.81  8.81  8.81   HGB 12.8 12.3  12.3  12.3   HCT 37.9 36.1*  36.1*  36.1*    202  202  202   , INR   Recent Labs  Lab 08/14/17  1227   INR 1.0   , Lipid Panel   Recent Labs  Lab 08/15/17  0305   CHOL 128   HDL 55   LDLCALC 66.4   TRIG 33   CHOLHDL 43.0   , Troponin   Recent Labs  Lab 08/14/17  1227 08/14/17  1601 08/15/17  0304   TROPONINI 0.068* 4.037* 27.123*    and All pertinent lab results from the last 24 hours have been reviewed.    Significant Imaging: Echocardiogram:   2D echo with color flow doppler:   Results for orders placed or performed during the hospital encounter of 08/14/17   2D echo with color flow doppler   Result Value Ref Range    EF 25 (A) 55 - 65    Mitral Valve Regurgitation MILD     Diastolic Dysfunction Yes (A)     Aortic Valve Regurgitation MODERATE (A)     Est. PA Systolic Pressure 51.16 (A)     Mitral Valve Mobility NORMAL     Tricuspid Valve Regurgitation TRIVIAL      Assessment and Plan:     Brief HPI: Patient seen this morning on rounds with son and daughter in law at bedside. Echo results reviewed with patient and family as well as continued medical management of CAD. No chest pain, nausea, SOB overnight. Likely transfer to floor later today if SBP remains stable.     DM (diabetes mellitus)     A1c 9.1  BG 51 overnight  Continue accuchecks AC/HS  With SSI        * ST elevation myocardial infarction (STEMI)     Admitted for anterior STEMI with shock.    Patient desires no invasive treatment.  Desires DNR/DNI.  Will monitor in ICU, trend cardiac enzymes, DAPT, high intensity statin.    SBP marginal overnight in the 80s-90s, improved this AM in the 110s  Continue heparin gtt for the time being.   ECG and Troponin this AM; AMARILIS improving, trop up to 27  Isolated self limited runs of VT on tele 3-6 beats  Likely transfer to telemetry today   Anticipate adding BB and ACEI if SBP improves; initially required Levo for pressure support             VTE Risk Mitigation         Ordered     Medium Risk of VTE  Once      08/14/17 1524     Reason for no Mechanical VTE Prophylaxis  Once      08/14/17 1524          Benigno Romo, HUGO  Cardiology  [facility]    I have seen the patient with Nurse Practitioner Benigno Romo. I agree with her assessment and plan as written above in the note.  Anterior LAD infarct - managed medically per patient's wishes.  Patient is DNR.  Large area of infarct on TTE.  Peak Tp of 27.  Will continue DAPT, statin.  BB/ACEi/ARB as tolerated.        Nicho Sandra MD, FACC, CCDS  Interventional Cardiology  [facility]

## 2017-08-15 NOTE — PLAN OF CARE
Nursing Transfer Note      Transfer ICU to room 466    Transfer via bed with RN Raymon and transport Nixon    Medicines sent: insulin pen    Chart send with patient: yes    Upon arrival to floor: Pt AAOx3. Family at bedside. Pt verbalized understanding of plan of care. Denies pain. VS per flowsheet. Fall risk band and yellow socks on. Bed alarm set, bed in lowest position, call bell in reach. Will continue to monitor.

## 2017-08-15 NOTE — NURSING
Dr. Sandra notified of pts troponin increase from 4 to 27.  Pt's Ptt was 71 this am, heparin decreased to 14 units will recheck at 11am.  Pt Blood glucose was 51 on am labs, 50% dextrose given 25gm.

## 2017-08-15 NOTE — PLAN OF CARE
Pharmacy notified nurse that they do not carry medication: Linagliptan. NP Wayne notified; stated okay to hold todays dose and inform family to bring medication from home to start 8/16. Family notified and verbalized understanding to bring med from home.

## 2017-08-15 NOTE — SUBJECTIVE & OBJECTIVE
Review of Systems   Constitution: Negative for diaphoresis and malaise/fatigue.   HENT: Negative.    Eyes: Negative.    Cardiovascular: Negative for chest pain, leg swelling, near-syncope, orthopnea, palpitations and paroxysmal nocturnal dyspnea.   Respiratory: Negative.    Endocrine: Negative.    Hematologic/Lymphatic: Negative.    Skin: Negative.    Musculoskeletal: Negative.    Gastrointestinal: Negative.  Negative for nausea and vomiting.   Genitourinary: Negative.    Neurological: Negative.    Psychiatric/Behavioral: Negative.  Negative for altered mental status.   Allergic/Immunologic: Negative.      Objective:     Vital Signs (Most Recent):  Temp: 98 °F (36.7 °C) (08/15/17 0702)  Pulse: 70 (08/15/17 0823)  Resp: 15 (08/15/17 0823)  BP: (!) 119/59 (08/15/17 0800)  SpO2: 100 % (08/15/17 0823) Vital Signs (24h Range):  Temp:  [97.4 °F (36.3 °C)-99.4 °F (37.4 °C)] 98 °F (36.7 °C)  Pulse:  [60-95] 70  Resp:  [14-33] 15  SpO2:  [88 %-100 %] 100 %  BP: ()/(39-73) 119/59     Weight: 53.5 kg (117 lb 15.1 oz)  Body mass index is 21.57 kg/m².     SpO2: 100 %  O2 Device (Oxygen Therapy): room air      Intake/Output Summary (Last 24 hours) at 08/15/17 0909  Last data filed at 08/15/17 0800   Gross per 24 hour   Intake            53.06 ml   Output              101 ml   Net           -47.94 ml       Lines/Drains/Airways     Peripheral Intravenous Line                 Peripheral IV - Single Lumen 08/14/17 1230 Left Antecubital less than 1 day         Peripheral IV - Single Lumen 08/14/17 1230 Right Antecubital less than 1 day                Physical Exam   Constitutional: She is oriented to person, place, and time. She appears well-developed and well-nourished. No distress.   HENT:   Head: Normocephalic and atraumatic.   Eyes: Right eye exhibits no discharge. Left eye exhibits no discharge.   Neck: No JVD present.   Cardiovascular: Normal rate and regular rhythm.    Murmur heard.   Harsh midsystolic murmur is present  with a grade of 3/6  at the upper right sternal border radiating to the neck  Pulmonary/Chest: Effort normal and breath sounds normal.   Abdominal: Soft. Bowel sounds are normal.   Musculoskeletal: She exhibits no edema.   Neurological: She is alert and oriented to person, place, and time.   Skin: Skin is warm and dry. She is not diaphoretic.   Psychiatric: She has a normal mood and affect. Her behavior is normal. Judgment and thought content normal.       Significant Labs:   BMP:   Recent Labs  Lab 08/14/17 1227 08/15/17  0305   * 51*  51*    136  136   K 3.7 3.6  3.6    108  108   CO2 20* 22*  22*   BUN 26 28  28   CREATININE 0.9 0.8  0.8   CALCIUM 8.1* 8.2*  8.2*   , CMP   Recent Labs  Lab 08/14/17  1227 08/15/17  0305    136  136   K 3.7 3.6  3.6    108  108   CO2 20* 22*  22*   * 51*  51*   BUN 26 28  28   CREATININE 0.9 0.8  0.8   CALCIUM 8.1* 8.2*  8.2*   PROT 5.4* 5.2*  5.2*   ALBUMIN 2.8* 2.6*  2.6*   BILITOT 0.4 0.5  0.5   ALKPHOS 66 87  87   AST 96* 246*  246*   ALT 68* 198*  198*   ANIONGAP 10 6*  6*   ESTGFRAFRICA >60 >60  >60   EGFRNONAA 56* >60  >60   , CBC   Recent Labs  Lab 08/14/17 1227 08/15/17  0305   WBC 7.19 8.81  8.81  8.81   HGB 12.8 12.3  12.3  12.3   HCT 37.9 36.1*  36.1*  36.1*    202  202  202   , INR   Recent Labs  Lab 08/14/17  1227   INR 1.0   , Lipid Panel   Recent Labs  Lab 08/15/17  0305   CHOL 128   HDL 55   LDLCALC 66.4   TRIG 33   CHOLHDL 43.0   , Troponin   Recent Labs  Lab 08/14/17  1227 08/14/17  1601 08/15/17  0304   TROPONINI 0.068* 4.037* 27.123*    and All pertinent lab results from the last 24 hours have been reviewed.    Significant Imaging: Echocardiogram:   2D echo with color flow doppler:   Results for orders placed or performed during the hospital encounter of 08/14/17   2D echo with color flow doppler   Result Value Ref Range    EF 25 (A) 55 - 65    Mitral Valve Regurgitation MILD      Diastolic Dysfunction Yes (A)     Aortic Valve Regurgitation MODERATE (A)     Est. PA Systolic Pressure 51.16 (A)     Mitral Valve Mobility NORMAL     Tricuspid Valve Regurgitation TRIVIAL

## 2017-08-15 NOTE — NURSING
Report called to Nely on 4B, pt transported in bed to  466 w/ cardiac monitor.  Pt tolerated move well. No CP, SOB, N/V, or other distress noted while in ICU today.

## 2017-08-15 NOTE — ASSESSMENT & PLAN NOTE
Admitted for anterior STEMI with shock.    Patient desires no invasive treatment.  Desires DNR/DNI.  Will monitor in ICU, trend cardiac enzymes, DAPT, high intensity statin.    SBP marginal overnight in the 80s-90s, improved this AM in the 110s  Continue heparin gtt for the time being.   ECG and Troponin this AM; AMARILIS improving, trop up to 27  Isolated self limited runs of VT on tele 3-6 beats  Likely transfer to telemetry today   Anticipate adding BB and ACEI if SBP improves; initially required Levo for pressure support

## 2017-08-16 ENCOUNTER — PATIENT MESSAGE (OUTPATIENT)
Dept: FAMILY MEDICINE | Facility: CLINIC | Age: 82
End: 2017-08-16

## 2017-08-16 LAB
POCT GLUCOSE: 204 MG/DL (ref 70–110)
POCT GLUCOSE: 211 MG/DL (ref 70–110)
POCT GLUCOSE: 277 MG/DL (ref 70–110)
POCT GLUCOSE: 79 MG/DL (ref 70–110)

## 2017-08-16 PROCEDURE — 25000003 PHARM REV CODE 250: Performed by: NURSE PRACTITIONER

## 2017-08-16 PROCEDURE — 11000001 HC ACUTE MED/SURG PRIVATE ROOM

## 2017-08-16 PROCEDURE — 94761 N-INVAS EAR/PLS OXIMETRY MLT: CPT

## 2017-08-16 PROCEDURE — A4216 STERILE WATER/SALINE, 10 ML: HCPCS | Performed by: NURSE PRACTITIONER

## 2017-08-16 PROCEDURE — 63600175 PHARM REV CODE 636 W HCPCS: Performed by: NURSE PRACTITIONER

## 2017-08-16 PROCEDURE — 97161 PT EVAL LOW COMPLEX 20 MIN: CPT

## 2017-08-16 PROCEDURE — G8978 MOBILITY CURRENT STATUS: HCPCS | Mod: CK

## 2017-08-16 PROCEDURE — 97116 GAIT TRAINING THERAPY: CPT

## 2017-08-16 PROCEDURE — 99233 SBSQ HOSP IP/OBS HIGH 50: CPT | Mod: ,,, | Performed by: INTERNAL MEDICINE

## 2017-08-16 PROCEDURE — G8979 MOBILITY GOAL STATUS: HCPCS | Mod: CJ

## 2017-08-16 RX ORDER — LORAZEPAM 1 MG/1
1 TABLET ORAL NIGHTLY PRN
Status: DISCONTINUED | OUTPATIENT
Start: 2017-08-16 | End: 2017-08-17 | Stop reason: HOSPADM

## 2017-08-16 RX ADMIN — LEVOTHYROXINE SODIUM 75 MCG: 25 TABLET ORAL at 06:08

## 2017-08-16 RX ADMIN — POLYETHYLENE GLYCOL 3350 17 G: 17 POWDER, FOR SOLUTION ORAL at 09:08

## 2017-08-16 RX ADMIN — INSULIN ASPART 4 UNITS: 100 INJECTION, SOLUTION INTRAVENOUS; SUBCUTANEOUS at 07:08

## 2017-08-16 RX ADMIN — INSULIN ASPART 2 UNITS: 100 INJECTION, SOLUTION INTRAVENOUS; SUBCUTANEOUS at 09:08

## 2017-08-16 RX ADMIN — INSULIN ASPART 6 UNITS: 100 INJECTION, SOLUTION INTRAVENOUS; SUBCUTANEOUS at 12:08

## 2017-08-16 RX ADMIN — METOPROLOL SUCCINATE 12.5 MG: 25 TABLET, EXTENDED RELEASE ORAL at 08:08

## 2017-08-16 RX ADMIN — ATORVASTATIN CALCIUM 40 MG: 40 TABLET, FILM COATED ORAL at 08:08

## 2017-08-16 RX ADMIN — ASPIRIN 81 MG: 81 TABLET, COATED ORAL at 08:08

## 2017-08-16 RX ADMIN — INSULIN DETEMIR 30 UNITS: 100 INJECTION, SOLUTION SUBCUTANEOUS at 08:08

## 2017-08-16 RX ADMIN — LORAZEPAM 1 MG: 1 TABLET ORAL at 09:08

## 2017-08-16 RX ADMIN — POLYETHYLENE GLYCOL 3350 17 G: 17 POWDER, FOR SOLUTION ORAL at 10:08

## 2017-08-16 RX ADMIN — CLOPIDOGREL BISULFATE 75 MG: 75 TABLET ORAL at 08:08

## 2017-08-16 RX ADMIN — SODIUM CHLORIDE, PRESERVATIVE FREE 3 ML: 5 INJECTION INTRAVENOUS at 06:08

## 2017-08-16 RX ADMIN — ACETAMINOPHEN 650 MG: 325 TABLET ORAL at 08:08

## 2017-08-16 RX ADMIN — PANTOPRAZOLE SODIUM 40 MG: 40 TABLET, DELAYED RELEASE ORAL at 08:08

## 2017-08-16 RX ADMIN — SODIUM CHLORIDE, PRESERVATIVE FREE 3 ML: 5 INJECTION INTRAVENOUS at 09:08

## 2017-08-16 NOTE — PLAN OF CARE
Problem: Patient Care Overview  Goal: Plan of Care Review  Plan of care reviewed with pt. Pt verbalizes understanding. Bed in lowest position, side rails up times 2, wheels locked, nonslip socks on, and bed alarm on. Call bell w/in reach. Instructed to call for needs/assist oob. No c/o chest pain, sob, or n/v t/o night. Pt on telemetry.NSR. No true red alarms noted. Will continue to monitor.

## 2017-08-16 NOTE — PLAN OF CARE
TN met with patient and family.   Informed therapy recommending home with home health, if not SNF.   Family requesting SNF on discharge. TN provided them with preference list.   PASS signed and uploaded.    SNF Preference List:    1)[facility]  2)[facility]  3)[facility]     --TN faxed over information to SNF preferences via [facility] care.    --Update: Patient denied by . TN notified patient and family. They did not want to provide any other preferences. They stated [facility] and [facility] were their only choices.    --Called and spoke with Inessa at [facility]. Referral received.     --TN also called [facility]. Also notified of referral sent.     --TN updated patient and family of above information.     08/16/17 8140   Discharge Reassessment   Assessment Type Discharge Planning Reassessment   Can the patient answer the patient profile reliably? Yes, cognitively intact   How does the patient rate their overall health at the present time? Good   Describe the patient's ability to walk at the present time. Walks with the help of equipment   Discharge plan remains the same: Yes   Provided patient/caregiver education on the expected discharge date and the discharge plan Yes   Discharge Plan A Skilled Nursing Facility   Discharge Plan B Home;Home Health   Patient choice form signed by patient/caregiver N/A   Involved the patient/caregiver in establishing a new discharge plan: Yes     Rachael Villar RN  Transition Navigator  [phone]

## 2017-08-16 NOTE — PLAN OF CARE
Problem: Physical Therapy Goal  Goal: Physical Therapy Goal  Goals to be met by: 17     Patient will increase functional independence with mobility by performin. Supine to sit with Modified [place]  2. Sit to stand transfer with Supervision  3. Bed to chair transfer with Stand-by Assistance using Rolling Walker  4. Gait  x 100 feet with Stand-by Assistance using Rolling Walker.   5. Lower extremity exercise program x 15 reps per handout, with supervision    Outcome: Ongoing (interventions implemented as appropriate)  PT evaluation completed and pt will benefit from skilled PT services upon d/c.

## 2017-08-16 NOTE — PLAN OF CARE
"TN went to meet with patient and family. Family was concerned patient would be going home by herself and is "very weak". Therapy working with patient, will await recommendations. TN also provided patient and family with senior resource guide regarding sitters at [facility]. They are agreeable to  or SNF, pending therapy recs.      Rachael Villar RN  Transition Navigator  [phone]  "

## 2017-08-16 NOTE — SUBJECTIVE & OBJECTIVE
Review of Systems   Constitution: Negative for diaphoresis and malaise/fatigue.   HENT: Negative.    Eyes: Negative.    Cardiovascular: Negative for chest pain, leg swelling, near-syncope, orthopnea, palpitations and paroxysmal nocturnal dyspnea.   Respiratory: Negative.    Endocrine: Negative.    Hematologic/Lymphatic: Negative.    Skin: Negative.    Musculoskeletal: Negative.    Gastrointestinal: Negative.  Negative for nausea and vomiting.   Genitourinary: Negative.    Neurological: Negative.    Psychiatric/Behavioral: Negative.  Negative for altered mental status.   Allergic/Immunologic: Negative.      Objective:     Vital Signs (Most Recent):  Temp: 97.7 °F (36.5 °C) (08/16/17 0745)  Pulse: 67 (08/16/17 0745)  Resp: 18 (08/16/17 0745)  BP: (!) 116/58 (08/16/17 0745)  SpO2: 95 % (08/16/17 0800) Vital Signs (24h Range):  Temp:  [97.4 °F (36.3 °C)-98.7 °F (37.1 °C)] 97.7 °F (36.5 °C)  Pulse:  [62-72] 67  Resp:  [16-26] 18  SpO2:  [94 %-98 %] 95 %  BP: (101-138)/(53-65) 116/58     Weight: 54.5 kg (120 lb 2.4 oz)  Body mass index is 21.98 kg/m².     SpO2: 95 %  O2 Device (Oxygen Therapy): room air      Intake/Output Summary (Last 24 hours) at 08/16/17 1032  Last data filed at 08/16/17 1016   Gross per 24 hour   Intake              965 ml   Output             1800 ml   Net             -835 ml       Lines/Drains/Airways     Peripheral Intravenous Line                 Peripheral IV - Single Lumen 08/14/17 1230 Left Antecubital 1 day         Peripheral IV - Single Lumen 08/14/17 1230 Right Antecubital 1 day                Physical Exam   Constitutional: She is oriented to person, place, and time. She appears well-developed and well-nourished. No distress.   HENT:   Head: Normocephalic and atraumatic.   Eyes: Right eye exhibits no discharge. Left eye exhibits no discharge.   Neck: No JVD present.   Cardiovascular: Normal rate and regular rhythm.    Murmur heard.   Harsh midsystolic murmur is present with a grade of 3/6   at the upper right sternal border radiating to the neck  Pulmonary/Chest: Effort normal and breath sounds normal.   Abdominal: Soft. Bowel sounds are normal.   Musculoskeletal: She exhibits no edema.   Neurological: She is alert and oriented to person, place, and time.   Skin: Skin is warm and dry. She is not diaphoretic.   Psychiatric: She has a normal mood and affect. Her behavior is normal. Judgment and thought content normal.       Significant Labs:   BMP:     Recent Labs  Lab 08/14/17  1227 08/15/17  0305   * 51*  51*    136  136   K 3.7 3.6  3.6    108  108   CO2 20* 22*  22*   BUN 26 28  28   CREATININE 0.9 0.8  0.8   CALCIUM 8.1* 8.2*  8.2*   , CMP     Recent Labs  Lab 08/14/17  1227 08/15/17  0305    136  136   K 3.7 3.6  3.6    108  108   CO2 20* 22*  22*   * 51*  51*   BUN 26 28  28   CREATININE 0.9 0.8  0.8   CALCIUM 8.1* 8.2*  8.2*   PROT 5.4* 5.2*  5.2*   ALBUMIN 2.8* 2.6*  2.6*   BILITOT 0.4 0.5  0.5   ALKPHOS 66 87  87   AST 96* 246*  246*   ALT 68* 198*  198*   ANIONGAP 10 6*  6*   ESTGFRAFRICA >60 >60  >60   EGFRNONAA 56* >60  >60   , CBC     Recent Labs  Lab 08/14/17  1227 08/15/17  0305   WBC 7.19 8.81  8.81  8.81   HGB 12.8 12.3  12.3  12.3   HCT 37.9 36.1*  36.1*  36.1*    202  202  202   , INR     Recent Labs  Lab 08/14/17  1227   INR 1.0   , Lipid Panel     Recent Labs  Lab 08/15/17  0305   CHOL 128   HDL 55   LDLCALC 66.4   TRIG 33   CHOLHDL 43.0   , Troponin     Recent Labs  Lab 08/14/17  1601 08/15/17  0304 08/15/17  0952   TROPONINI 4.037* 27.123* 17.641*    and All pertinent lab results from the last 24 hours have been reviewed.    Significant Imaging: Echocardiogram:   2D echo with color flow doppler:   Results for orders placed or performed during the hospital encounter of 08/14/17   2D echo with color flow doppler   Result Value Ref Range    EF 25 (A) 55 - 65    Mitral Valve Regurgitation MILD     Diastolic  Dysfunction Yes (A)     Aortic Valve Regurgitation MODERATE (A)     Est. PA Systolic Pressure 51.16 (A)     Mitral Valve Mobility NORMAL     Tricuspid Valve Regurgitation TRIVIAL

## 2017-08-16 NOTE — ASSESSMENT & PLAN NOTE
Admitted for anterior STEMI with shock.    Patient desires no invasive treatment.  Desires DNR/DNI.  Will monitor in ICU, trend cardiac enzymes, DAPT, high intensity statin.    SBP marginal overnight in the 80s-90s, improved 08/15/2017 AM in the 110s  Continue heparin gtt for the time being ( discontinued on 08/15/2017 at 1300).   trop peaked at 27, 17 at last check  Isolated self limited runs of VT on tele 3-6 beats  Transferred to tele floor on 08/15/2017   Anticipate adding BB and ACEI if SBP improves; initially required Levo for pressure support   08/16/2017 No further runs of VT per tele review, BB initiated after BP improved to 110s-130s. DAPT continued as well as statin  CM consulted for DC planning

## 2017-08-16 NOTE — PT/OT/SLP EVAL
"Physical Therapy  Evaluation    Irma Pedroza   MRN: [id number]   Admitting Diagnosis: ST elevation myocardial infarction (STEMI)    PT Received On: 08/16/17  PT Start Time: 0901     PT Stop Time: 0928    PT Total Time (min): 27 min       Billable Minutes:  Evaluation 17 and Gait Training 10    Diagnosis: ST elevation myocardial infarction (STEMI)  The primary encounter diagnosis was ST elevation myocardial infarction (STEMI), unspecified artery. Diagnoses of Cardiogenic shock and STEMI (ST elevation myocardial infarction) were also pertinent to this visit.    Past Medical History:   Diagnosis Date    Anxiety     Depression     Diabetes mellitus, type 2     Hyperlipidemia     Thyroid disease       Past Surgical History:   Procedure Laterality Date    BLADDER SUSPENSION      CATARACT EXTRACTION      HYSTERECTOMY      ORIF CONGENITAL HIP DISLOCATION         Referring physician: Dr. Romo  Date referred to PT: 8/15/17    General Precautions: Standard, fall, hearing impaired  Orthopedic Precautions: N/A   Braces: N/A       Do you have any cultural, spiritual, Church conflicts, given your current situation?: none reported to PT    Patient History:  Living Environment Comment: Pt, pt's son, and pt's daughter in law present to provide information regarding PLOF. Pt lives alone in a [facility] with no [facility] and her son and dtr-in-law live 2 blocks away and check on her several times a day and bring her food. Pt was ambulating mod I with RW but family states she has fallen several times.   Equipment Currently Used at Home: bedside commode, wheelchair, walker, rolling, shower chair  DME owned (not currently used): none    Previous Level of Function:  Ambulation Skills: needs device  Transfer Skills: needs device  ADL Skills: needs device    Subjective:  Communicated with MATHIEU Mckay prior to session.  Pt reports feeling shaky today but that she feels better.  Chief Complaint: "shakiness" with walking  Patient goals: to " "return to PLOF    Pain/Comfort  Pain Rating 1: 0/10  Pain Rating Post-Intervention 1: 0/10      Objective:   Patient found with: bed alarm, telemetry     Cognitive Exam:  Oriented to: Person, Place, Time and Situation    Follows Commands/attention: Follows one-step commands  Communication: clear/fluent  Safety awareness/insight to disability: impaired    Physical Exam:  Postural examination/scapula alignment: Rounded shoulder and Head forward    Skin integrity: Visible skin intact  Edema: None noted    Sensation:   Intact     Lower Extremity Range of Motion:  Right Lower Extremity: WFL  Left Lower Extremity: WFL    Lower Extremity Strength:  Right Lower Extremity: WFL except hip 4-/5  Left Lower Extremity: WFL except hip 3+/5     Fine motor coordination:  Intact    Functional Mobility:  Bed Mobility:  Rolling/Turning Right: Supervision, With side rail  Scooting/Bridging: Supervision  Supine to Sit: Stand by Assistance, With side rail  Sit to Supine: Stand by Assistance    Transfers:  Sit <> Stand Assistance: Contact Guard Assistance  Sit <> Stand Assistive Device: Rolling Walker  Bed <> Chair Assistance: Activity did not occur  Toilet Transfer Technique: Stand Pivot  Toilet Transfer Assistance: Contact Guard Assistance  Toilet Transfer Assistive Device: Rolling Walker    Gait:   Gait Distance: Pt ambulated 2 bouts with RW over level surface with CGA for safety: 20 feet and 50 feet. Pt demonstrates unstable gait pattern with decreased step length (B) and foot flat with gait distance limited by pt fatigue and feeling "shaky".  Assistance 1: Contact Guard Assistance  Gait Assistive Device: Rolling walker  Gait Deviation(s): decreased kamille, increased time in double stance, decreased step length, foot flat    Stairs:  Activity did not occur    Balance:   Static Sit: FAIR+: Able to take MINIMAL challenges from all directions  Dynamic Sit: FAIR+: Maintains balance through MINIMAL excursions of active trunk " motion  Static Stand: FAIR: Maintains without assist but unable to take challenges  Dynamic stand: FAIR: Needs CONTACT GUARD during gait    Therapeutic Activities and Exercises:  PT evaluation completed. Pt instructed in 2 bouts of gait as reported above. Pt with instability noted during gait although no overt LOB.    Pt and pt's family educated on PT POC and recommending 24/7 supervision/assist.    AM-PAC 6 CLICK MOBILITY  How much help from another person does this patient currently need?   1 = Unable, Total/Dependent Assistance  2 = A lot, Maximum/Moderate Assistance  3 = A little, Minimum/Contact Guard/Supervision  4 = None, Modified [place]/Independent    Turning over in bed (including adjusting bedclothes, sheets and blankets)?: 3  Sitting down on and standing up from a chair with arms (e.g., wheelchair, bedside commode, etc.): 3  Moving from lying on back to sitting on the side of the bed?: 3  Moving to and from a bed to a chair (including a wheelchair)?: 3  Need to walk in hospital room?: 3  Climbing 3-5 steps with a railing?: 2  Total Score: 17     AM-PAC Raw Score CMS G-Code Modifier Level of Impairment Assistance   6 % Total / Unable   7 - 9 CM 80 - 100% Maximal Assist   10 - 14 CL 60 - 80% Moderate Assist   15 - 19 CK 40 - 60% Moderate Assist   20 - 22 CJ 20 - 40% Minimal Assist   23 CI 1-20% SBA / CGA   24 CH 0% Independent/ Mod I     Patient left HOB elevated with all lines intact, call button in reach, bed alarm on, RN notified and family present.    Assessment:   Irma Pedroza is a 89 y.o. female with a medical diagnosis of ST elevation myocardial infarction (STEMI) and presents with impaired endurance, impaired standing balance, impaired gait mechanics, and LE weakness. Recommend 24/7 supervision for safety of patient. Spoke with pt's son and daughter-in-law regarding this and they agree and will look at their options.    Rehab identified problem list/impairments: Rehab identified  problem list/impairments: weakness, impaired endurance, impaired self care skills, impaired functional mobilty, gait instability, impaired balance, decreased lower extremity function, decreased safety awareness    Rehab potential is good.    Activity tolerance: Fair    Discharge recommendations: Discharge Facility/Level Of Care Needs: home with home health, nursing facility, skilled (if 24/ supervision/assist available, recommend d/c home with HHPT. If not, SNF)     Barriers to discharge: Barriers to Discharge:  (recommending 24/ supervision/assist )    Equipment recommendations: Equipment Needed After Discharge: none     GOALS:    Physical Therapy Goals        Problem: Physical Therapy Goal    Goal Priority Disciplines Outcome Goal Variances Interventions   Physical Therapy Goal     PT/OT, PT Ongoing (interventions implemented as appropriate)     Description:  Goals to be met by: 17     Patient will increase functional independence with mobility by performin. Supine to sit with Modified [place]  2. Sit to stand transfer with Supervision  3. Bed to chair transfer with Stand-by Assistance using Rolling Walker  4. Gait  x 100 feet with Stand-by Assistance using Rolling Walker.   5. Lower extremity exercise program x 15 reps per handout, with supervision                      PLAN:    Patient to be seen 6 x/week to address the above listed problems via gait training, therapeutic activities, therapeutic exercises  Plan of Care expires: 17  Plan of Care reviewed with: patient, son, other (see comments) (and daughter-in-law)    Functional Assessment Tool Used: AMPAC  Score: 17  Functional Limitation: Mobility: Walking and moving around  Mobility: Walking and Moving Around Current Status (): CK  Mobility: Walking and Moving Around Goal Status (): JESUS Champagne, PT  2017

## 2017-08-16 NOTE — PROGRESS NOTES
[facility]  Cardiology  Progress Note    Patient Name: Irma Pedroza  MRN: [id number]  Admission Date: 8/14/2017  Hospital Length of Stay: 2 days  Code Status: DNR   Attending Physician: Nicho Sandra MD   Primary Care Physician: Leonidas Potts MD  Expected Discharge Date:   Principal Problem:ST elevation myocardial infarction (STEMI)    Subjective:     Hospital Course:   Treated in ED - Plavix and aspirin.   Heparin.  Norepinephrine administered for hypotension.  NSVT on monitoring.  Amiodarone IV.      Long discussion with patient regarding options for treatment.  She is confused, likley due to AMS with shock.  Spoke with NOK - her son, who had also spoken with patient.  After discussing options of emergency cardiac cath vs. Medical management, he feels that she would not desire to proceed with invasive treatments at her age and in her condition, understands high risk of death - 50-70% mortality with shock.    Patient admitted to ICU for further monitoring and stabilization.  TTE shows EF 30% with anterior/apical akinesis.      08/15/2017 No significant events overnight. SR on telemetry with self limiting isolated runs of VT 3-6 beats. Heparin gtt continued. No chest pain, SOB, or nausea. SBP 82-110s. Troponin up to 27 on last check.     08/16/2017 No VT per tele review and no further nausea, chest pain, diuresis. Heparin gtt discontinued yesterday and patient transferred to tele. PT consulted and ambulated patient. CM consulted for discharge planning; patient lives alone and family concerned regarding ability to care for herself. SBP improved in the 110s-130s, BB therapy initiated and tolerated thus far.       Review of Systems   Constitution: Negative for diaphoresis and malaise/fatigue.   HENT: Negative.    Eyes: Negative.    Cardiovascular: Negative for chest pain, leg swelling, near-syncope, orthopnea, palpitations and paroxysmal nocturnal dyspnea.   Respiratory: Negative.     Endocrine: Negative.    Hematologic/Lymphatic: Negative.    Skin: Negative.    Musculoskeletal: Negative.    Gastrointestinal: Negative.  Negative for nausea and vomiting.   Genitourinary: Negative.    Neurological: Negative.    Psychiatric/Behavioral: Negative.  Negative for altered mental status.   Allergic/Immunologic: Negative.      Objective:     Vital Signs (Most Recent):  Temp: 97.7 °F (36.5 °C) (08/16/17 0745)  Pulse: 67 (08/16/17 0745)  Resp: 18 (08/16/17 0745)  BP: (!) 116/58 (08/16/17 0745)  SpO2: 95 % (08/16/17 0800) Vital Signs (24h Range):  Temp:  [97.4 °F (36.3 °C)-98.7 °F (37.1 °C)] 97.7 °F (36.5 °C)  Pulse:  [62-72] 67  Resp:  [16-26] 18  SpO2:  [94 %-98 %] 95 %  BP: (101-138)/(53-65) 116/58     Weight: 54.5 kg (120 lb 2.4 oz)  Body mass index is 21.98 kg/m².     SpO2: 95 %  O2 Device (Oxygen Therapy): room air      Intake/Output Summary (Last 24 hours) at 08/16/17 1032  Last data filed at 08/16/17 1016   Gross per 24 hour   Intake              965 ml   Output             1800 ml   Net             -835 ml       Lines/Drains/Airways     Peripheral Intravenous Line                 Peripheral IV - Single Lumen 08/14/17 1230 Left Antecubital 1 day         Peripheral IV - Single Lumen 08/14/17 1230 Right Antecubital 1 day                Physical Exam   Constitutional: She is oriented to person, place, and time. She appears well-developed and well-nourished. No distress.   HENT:   Head: Normocephalic and atraumatic.   Eyes: Right eye exhibits no discharge. Left eye exhibits no discharge.   Neck: No JVD present.   Cardiovascular: Normal rate and regular rhythm.    Murmur heard.   Harsh midsystolic murmur is present with a grade of 3/6  at the upper right sternal border radiating to the neck  Pulmonary/Chest: Effort normal and breath sounds normal.   Abdominal: Soft. Bowel sounds are normal.   Musculoskeletal: She exhibits no edema.   Neurological: She is alert and oriented to person, place, and time.    Skin: Skin is warm and dry. She is not diaphoretic.   Psychiatric: She has a normal mood and affect. Her behavior is normal. Judgment and thought content normal.       Significant Labs:   BMP:     Recent Labs  Lab 08/14/17  1227 08/15/17  0305   * 51*  51*    136  136   K 3.7 3.6  3.6    108  108   CO2 20* 22*  22*   BUN 26 28  28   CREATININE 0.9 0.8  0.8   CALCIUM 8.1* 8.2*  8.2*   , CMP     Recent Labs  Lab 08/14/17  1227 08/15/17  0305    136  136   K 3.7 3.6  3.6    108  108   CO2 20* 22*  22*   * 51*  51*   BUN 26 28  28   CREATININE 0.9 0.8  0.8   CALCIUM 8.1* 8.2*  8.2*   PROT 5.4* 5.2*  5.2*   ALBUMIN 2.8* 2.6*  2.6*   BILITOT 0.4 0.5  0.5   ALKPHOS 66 87  87   AST 96* 246*  246*   ALT 68* 198*  198*   ANIONGAP 10 6*  6*   ESTGFRAFRICA >60 >60  >60   EGFRNONAA 56* >60  >60   , CBC     Recent Labs  Lab 08/14/17  1227 08/15/17  0305   WBC 7.19 8.81  8.81  8.81   HGB 12.8 12.3  12.3  12.3   HCT 37.9 36.1*  36.1*  36.1*    202  202  202   , INR     Recent Labs  Lab 08/14/17  1227   INR 1.0   , Lipid Panel     Recent Labs  Lab 08/15/17  0305   CHOL 128   HDL 55   LDLCALC 66.4   TRIG 33   CHOLHDL 43.0   , Troponin     Recent Labs  Lab 08/14/17  1601 08/15/17  0304 08/15/17  0952   TROPONINI 4.037* 27.123* 17.641*    and All pertinent lab results from the last 24 hours have been reviewed.    Significant Imaging: Echocardiogram:   2D echo with color flow doppler:   Results for orders placed or performed during the hospital encounter of 08/14/17   2D echo with color flow doppler   Result Value Ref Range    EF 25 (A) 55 - 65    Mitral Valve Regurgitation MILD     Diastolic Dysfunction Yes (A)     Aortic Valve Regurgitation MODERATE (A)     Est. PA Systolic Pressure 51.16 (A)     Mitral Valve Mobility NORMAL     Tricuspid Valve Regurgitation TRIVIAL      Assessment and Plan:     Brief HPI: Patient seen this morning on rounds ambulating  with PT and tolerating well. BB therapy initiated and POC discussed with patient and family. CM consulted for DC planning.    DM (diabetes mellitus)     A1c 9.1  Continue accuchecks AC/HS  With SSI  Home insulin resumed         * ST elevation myocardial infarction (STEMI)    Admitted for anterior STEMI with shock.    Patient desires no invasive treatment.  Desires DNR/DNI.  Will monitor in ICU, trend cardiac enzymes, DAPT, high intensity statin.    SBP marginal overnight in the 80s-90s, improved 08/15/2017 AM in the 110s  Continue heparin gtt for the time being ( discontinued on 08/15/2017 at 1300).   trop peaked at 27, 17 at last check  Isolated self limited runs of VT on tele 3-6 beats  Transferred to tele floor on 08/15/2017   Anticipate adding BB and ACEI if SBP improves; initially required Levo for pressure support   08/16/2017 No further runs of VT per tele review, BB initiated after BP improved to 110s-130s. DAPT continued as well as statin  CM consulted for DC planning             VTE Risk Mitigation         Ordered     Medium Risk of VTE  Once      08/14/17 1524     Reason for no Mechanical VTE Prophylaxis  Once      08/14/17 1524          Benigno Romo, HUGO  Cardiology  [facility]    I have seen the patient with Nurse Practitioner Benigno Romo. I agree with her assessment and plan as written above in the note.  Patient has overall done well in face of medical Rx for Anterior STEMI.  No signs of ongoing shock.    This is a complex, high risk patient who is critically ill with anterior STEMI.        Nicho Sandra MD, FACC, CCDS  Interventional Cardiology  [facility]

## 2017-08-17 ENCOUNTER — HOSPITAL ENCOUNTER (INPATIENT)
Facility: HOSPITAL | Age: 82
LOS: 7 days | Discharge: ANOTHER HOSPITAL | DRG: 281 | End: 2017-08-24
Attending: INTERNAL MEDICINE | Admitting: INTERNAL MEDICINE
Payer: MEDICARE

## 2017-08-17 VITALS
WEIGHT: 120.13 LBS | TEMPERATURE: 98 F | OXYGEN SATURATION: 95 % | DIASTOLIC BLOOD PRESSURE: 62 MMHG | HEART RATE: 72 BPM | BODY MASS INDEX: 22.11 KG/M2 | SYSTOLIC BLOOD PRESSURE: 137 MMHG | HEIGHT: 62 IN | RESPIRATION RATE: 18 BRPM

## 2017-08-17 DIAGNOSIS — R53.81 DEBILITY: ICD-10-CM

## 2017-08-17 DIAGNOSIS — I21.3 ST ELEVATION MYOCARDIAL INFARCTION (STEMI), UNSPECIFIED ARTERY: ICD-10-CM

## 2017-08-17 DIAGNOSIS — R07.9 CHEST PAIN: ICD-10-CM

## 2017-08-17 PROBLEM — R57.0 CARDIOGENIC SHOCK: Status: ACTIVE | Noted: 2017-08-17

## 2017-08-17 LAB
POCT GLUCOSE: 123 MG/DL (ref 70–110)
POCT GLUCOSE: 284 MG/DL (ref 70–110)
POCT GLUCOSE: 308 MG/DL (ref 70–110)

## 2017-08-17 PROCEDURE — A4216 STERILE WATER/SALINE, 10 ML: HCPCS | Performed by: NURSE PRACTITIONER

## 2017-08-17 PROCEDURE — 97110 THERAPEUTIC EXERCISES: CPT

## 2017-08-17 PROCEDURE — 25000003 PHARM REV CODE 250: Performed by: NURSE PRACTITIONER

## 2017-08-17 PROCEDURE — 94761 N-INVAS EAR/PLS OXIMETRY MLT: CPT

## 2017-08-17 PROCEDURE — 99238 HOSP IP/OBS DSCHRG MGMT 30/<: CPT | Mod: ,,, | Performed by: NURSE PRACTITIONER

## 2017-08-17 PROCEDURE — 63600175 PHARM REV CODE 636 W HCPCS: Performed by: NURSE PRACTITIONER

## 2017-08-17 PROCEDURE — 12000000 HC SNF SEMI-PRIVATE ROOM

## 2017-08-17 PROCEDURE — 86580 TB INTRADERMAL TEST: CPT | Performed by: NURSE PRACTITIONER

## 2017-08-17 PROCEDURE — 97116 GAIT TRAINING THERAPY: CPT

## 2017-08-17 RX ORDER — RAMELTEON 8 MG/1
8 TABLET ORAL NIGHTLY PRN
Status: DISCONTINUED | OUTPATIENT
Start: 2017-08-17 | End: 2017-08-25 | Stop reason: HOSPADM

## 2017-08-17 RX ORDER — MAG HYDROX/ALUMINUM HYD/SIMETH 200-200-20
15 SUSPENSION, ORAL (FINAL DOSE FORM) ORAL EVERY 6 HOURS PRN
Status: CANCELLED | OUTPATIENT
Start: 2017-08-17

## 2017-08-17 RX ORDER — CLOPIDOGREL BISULFATE 75 MG/1
75 TABLET ORAL DAILY
Qty: 30 TABLET | Refills: 11 | Status: SHIPPED | OUTPATIENT
Start: 2017-08-17 | End: 2018-08-17

## 2017-08-17 RX ORDER — MAG HYDROX/ALUMINUM HYD/SIMETH 200-200-20
15 SUSPENSION, ORAL (FINAL DOSE FORM) ORAL EVERY 6 HOURS PRN
Status: DISCONTINUED | OUTPATIENT
Start: 2017-08-17 | End: 2017-08-25 | Stop reason: HOSPADM

## 2017-08-17 RX ORDER — AMOXICILLIN 250 MG
1 CAPSULE ORAL 2 TIMES DAILY
Status: CANCELLED | OUTPATIENT
Start: 2017-08-17

## 2017-08-17 RX ORDER — ASPIRIN 81 MG/1
81 TABLET ORAL DAILY
Refills: 0 | COMMUNITY
Start: 2017-08-17 | End: 2018-08-17

## 2017-08-17 RX ORDER — METOCLOPRAMIDE HYDROCHLORIDE 5 MG/5ML
5 SOLUTION ORAL EVERY 6 HOURS PRN
Status: CANCELLED | OUTPATIENT
Start: 2017-08-17

## 2017-08-17 RX ORDER — METOCLOPRAMIDE HYDROCHLORIDE 5 MG/5ML
5 SOLUTION ORAL EVERY 6 HOURS PRN
Status: DISCONTINUED | OUTPATIENT
Start: 2017-08-17 | End: 2017-08-25 | Stop reason: HOSPADM

## 2017-08-17 RX ORDER — ACETAMINOPHEN 325 MG/1
650 TABLET ORAL EVERY 6 HOURS PRN
Status: CANCELLED | OUTPATIENT
Start: 2017-08-17

## 2017-08-17 RX ORDER — AMOXICILLIN 250 MG
1 CAPSULE ORAL 2 TIMES DAILY
Status: DISCONTINUED | OUTPATIENT
Start: 2017-08-17 | End: 2017-08-18

## 2017-08-17 RX ORDER — ATORVASTATIN CALCIUM 40 MG/1
40 TABLET, FILM COATED ORAL DAILY
Qty: 30 TABLET | Refills: 11 | Status: SHIPPED | OUTPATIENT
Start: 2017-08-17 | End: 2018-08-17

## 2017-08-17 RX ORDER — ACETAMINOPHEN 325 MG/1
650 TABLET ORAL EVERY 6 HOURS PRN
Status: DISCONTINUED | OUTPATIENT
Start: 2017-08-17 | End: 2017-08-25 | Stop reason: HOSPADM

## 2017-08-17 RX ORDER — RAMELTEON 8 MG/1
8 TABLET ORAL NIGHTLY PRN
Status: CANCELLED | OUTPATIENT
Start: 2017-08-17

## 2017-08-17 RX ORDER — METOPROLOL SUCCINATE 25 MG/1
12.5 TABLET, EXTENDED RELEASE ORAL DAILY
Qty: 15 TABLET | Refills: 11 | Status: SHIPPED | OUTPATIENT
Start: 2017-08-17 | End: 2018-08-17

## 2017-08-17 RX ADMIN — INSULIN DETEMIR 30 UNITS: 100 INJECTION, SOLUTION SUBCUTANEOUS at 09:08

## 2017-08-17 RX ADMIN — RAMELTEON 8 MG: 8 TABLET, FILM COATED ORAL at 09:08

## 2017-08-17 RX ADMIN — PANTOPRAZOLE SODIUM 40 MG: 40 TABLET, DELAYED RELEASE ORAL at 09:08

## 2017-08-17 RX ADMIN — TUBERCULIN PURIFIED PROTEIN DERIVATIVE 5 UNITS: 5 INJECTION INTRADERMAL at 01:08

## 2017-08-17 RX ADMIN — STANDARDIZED SENNA CONCENTRATE AND DOCUSATE SODIUM 1 TABLET: 8.6; 5 TABLET, FILM COATED ORAL at 09:08

## 2017-08-17 RX ADMIN — CLOPIDOGREL BISULFATE 75 MG: 75 TABLET ORAL at 09:08

## 2017-08-17 RX ADMIN — ATORVASTATIN CALCIUM 40 MG: 40 TABLET, FILM COATED ORAL at 09:08

## 2017-08-17 RX ADMIN — ASPIRIN 81 MG: 81 TABLET, COATED ORAL at 09:08

## 2017-08-17 RX ADMIN — METOPROLOL SUCCINATE 12.5 MG: 25 TABLET, EXTENDED RELEASE ORAL at 09:08

## 2017-08-17 RX ADMIN — INSULIN ASPART 8 UNITS: 100 INJECTION, SOLUTION INTRAVENOUS; SUBCUTANEOUS at 12:08

## 2017-08-17 RX ADMIN — SODIUM CHLORIDE, PRESERVATIVE FREE 3 ML: 5 INJECTION INTRAVENOUS at 06:08

## 2017-08-17 RX ADMIN — LEVOTHYROXINE SODIUM 75 MCG: 25 TABLET ORAL at 06:08

## 2017-08-17 NOTE — PROGRESS NOTES
The Sw left a message for Inessa at [facility] and Mrs. Lamas at the [facility] in reference to the status of the referral.

## 2017-08-17 NOTE — DISCHARGE INSTRUCTIONS
DIABETES, DIET (ENGLISH) View Edit Remove  HEART ATTACK, SYMPTOMS OF A (ENGLISH) View Edit Remove  HEART ATTACK: BACK AT HOME (ENGLISH) View Edit Remove  HEART DISEASE, RISK FACTORS (ENGLISH) View Edit Remove  SHOCK: FIRST AID (ENGLISH) View Edit Remove  WARNING SIGNS OF A HEART ATTACK (ENGLISH) View Edit Remove  HEART ATTACK, DISCHARGE INSTRUCTIONS FOR (ENGLISH) View Edit Remove  HEART ATTACK OR ANGINA, RECOGNIZING A (ENGLISH) View Edit Remove

## 2017-08-17 NOTE — PROGRESS NOTES
The Sw received a message from Inessa at [facility] they have medically accepted the pt. The Sw informed Rachael of this info who states she will inform the pt's son. The Sw spoke to Benigno(NP)who states she will write the d/c orders. The Sw left a message for Inessa at [facility] to return the call.     2:52pm The Sw spoke to Inessa and arranged the transport with [employer]([phone]). The Sw faxed the trip sheet and requested a 4pm w/c [employer] transport. Inessa will call the Sw back to confirm the d/c orders are correct. The Sw spoke to the pt's nurse and will give her the contact info of [phone] to call report for this pt. The Sw is awaiting a return call from [employer].

## 2017-08-17 NOTE — PHYSICIAN QUERY
PT Name: Irma Pedroza  MR #: [id number]    Physician Query Form - Neurological Condition Clarification       CDS/: Areille Ling               Contact information: [phone]    This form is a permanent document in the medical record.     Query Date: August 17, 2017    By submitting this query, we are merely seeking further clarification of documentation. Please utilize your independent clinical judgment when addressing the question(s) below.    The Medical record contains the following:   Indicators   Supporting Clinical Findings Location in Medical Record   X AMS, Confusion, LOC, etc. She is confused, likley due to AMS with shock PN 8/17   X Acute / Chronic Illness ST elevation myocardial infarction (STEMI)    Shock PN 8/17    Radiology Findings      Electrolyte Imbalance     X Medication Norepinephrine IV MAR 8/14    Treatment     X Other BP= 96/52, 88/44, 82/47,88/51 VS flow sheet 8/15     Provider, please specify the diagnosis or diagnoses associated with above clinical findings.    [  ] Other Encephalopathy    [  ] Unspecified Encephalopathy    [ x ] Other (please specify): ___AMS in setting of STEMI with cardiogenic shock ________    [  ] Clinically Undetermined      Please document in your progress notes daily for the duration of treatment until resolved, and  include in your discharge summary.

## 2017-08-17 NOTE — PROGRESS NOTES
The Sw spoke to Mrs. Lamas at the [facility] and she has no beds. The Sw informed Dee Dee of this info.     1:29pm The Sw was informed by Rachael that Mrs. Lamas noticed the pt's PCP is their medical director and states they now may possibly have snf beds available. The Sw faxed info to Mrs. Lamas along with the medical release info form. The Sw left her a message to return the call.

## 2017-08-17 NOTE — PLAN OF CARE
Follow-up With  Details  Why  Contact Info   [facility]    Skilled Nursing Facility  [address]LO[address]  [facility] [address]  [phone]   Nicho Sandra MD  On 9/5/2017  at 8:40 am--Hospital Follow-up with Cardiology  [address]  [address]  [place] [address]  [phone]        Future Appointments  Date Time Provider Department Center   9/5/2017 8:40 AM Nicho Sandra MD [facility] CARDIO [place]     TN met with patient and son Albino in room. They are agreeable for discharge today to [facility]. Transport set up for 4pm per  [facility]. No other needs.     08/17/17 1514   Final Note   Assessment Type Final Discharge Note   Discharge Disposition SNF   Discharge planning education complete? Yes   Hospital Follow Up  Appt(s) scheduled? Yes   Discharge plans and expectations educations in teach back method with documentation complete? Yes   Offered [facility] -- Bedside Delivery? n/a   Discharge/Hospital Encounter Summary to ([facility]) PCP Yes   Referral to Outpatient Case Management complete? n/a   Referral to / orders for Home Health Complete? n/a   30 day supply of medicines given at discharge, if documented non-compliance / non-adherence? n/a   Any social issues identified prior to discharge? No   Did you assess the readiness or willingness of the family or caregiver to support self management of care? Yes   Right Care Referral Info   Post Acute Recommendation SNF   Referral Type SNF   Facility Name [facility]     Rachael Villar RN  Transition Navigator  [phone]

## 2017-08-17 NOTE — PLAN OF CARE
Problem: Patient Care Overview  Goal: Plan of Care Review  Room air SpO2  94 %. Pt with no apparent distress noted. Will continue to monitor.

## 2017-08-17 NOTE — PLAN OF CARE
Problem: Patient Care Overview  Goal: Plan of Care Review  Outcome: Ongoing (interventions implemented as appropriate)  Plan of care reviewed with patient. Verbalized understanding. No distress noted. Will continue to monitor. On telemetry, no red alarms or ectopy.NSR, Hr 70s, will continue to monitor.Bed alarm active, bed in lowest position, call light within reach. Patient instructed to use call light for assistance. Verbalized understanding. Bed rails up x2.Patient bumped left shin on bedside table while trying to get up to BSC; small skin tear to shin with minimal bleeding; wound dressed. Will continue to monitor.

## 2017-08-17 NOTE — ASSESSMENT & PLAN NOTE
Admitted for anterior STEMI with shock.    Patient desires no invasive treatment.  Desires DNR/DNI.  Will monitor in ICU, trend cardiac enzymes, DAPT, high intensity statin.    SBP marginal overnight in the 80s-90s, improved 08/15/2017 AM in the 110s  Continue heparin gtt for the time being ( discontinued on 08/15/2017 at 1300).   trop peaked at 27, 17 at last check  Isolated self limited runs of VT on tele 3-6 beats  Transferred to tele floor on 08/15/2017   Anticipate adding BB and ACEI if SBP improves; initially required Levo for pressure support   08/16/2017 No further runs of VT per tele review, BB initiated after BP improved to 110s-130s. DAPT continued as well as statin  CM consulted for DC planning   08/17/2017 Tolerating medical management of CAD without significant side effects. VSS, awaiting SNF placement

## 2017-08-17 NOTE — DISCHARGE SUMMARY
[facility]  Cardiology  Discharge Summary      Patient Name: Irma Pedroza  MRN: [id number]  Admission Date: 8/14/2017  Hospital Length of Stay: 3 days  Discharge Date and Time:  08/17/2017 2:41 PM  Attending Physician: Nicho Sandra MD    Discharging Provider: Benigno Romo NP  Primary Care Physician: Leonidas Potts MD    HPI:   Mrs. Pedroza is a 89 year-old female with no known prior CAD who developed severe nausea and vomiting and overall general malaise this morning at home.  She called 911, and upon arrival EMS did ECG which demonstrated anterior STEMI.  She was brought to [facility] ED.  Repeat ECG demonstrated anterior STEMI.  Patient denies chest pain.  BP was 66/40 initially upon arrival.      Procedure(s) (LRB):  STENT-CORONARY (N/A)     Indwelling Lines/Drains at time of discharge:  Lines/Drains/Airways          No matching active lines, drains, or airways          Hospital Course:  Treated in ED - Plavix and aspirin.   Heparin.  Norepinephrine administered for hypotension.  NSVT on monitoring.  Amiodarone IV.      Long discussion with patient regarding options for treatment.  She is confused, likley due to AMS with shock.  Spoke with NOK - her son, who had also spoken with patient.  After discussing options of emergency cardiac cath vs. Medical management, he feels that she would not desire to proceed with invasive treatments at her age and in her condition, understands high risk of death - 50-70% mortality with shock.    Patient admitted to ICU for further monitoring and stabilization.  TTE shows EF 30% with anterior/apical akinesis.      08/15/2017 No significant events overnight. SR on telemetry with self limiting isolated runs of VT 3-6 beats. Heparin gtt continued. No chest pain, SOB, or nausea. SBP 82-110s. Troponin up to 27 on last check.     08/16/2017 No VT per tele review and no further nausea, chest pain, diuresis. Heparin gtt discontinued yesterday and patient  transferred to tele. PT consulted and ambulated patient. CM consulted for discharge planning; patient lives alone and family concerned regarding ability to care for herself. SBP improved in the 110s-130s, BB therapy initiated and tolerated thus far.     08/17/2017 SNF placement pending. VSS and tolerating medical management of CAD without significant side effects. No significant events overnight.     Consults:   Consults         Status Ordering Provider     Inpatient consult to SNF [facility]  Once     Provider:  (Not yet assigned)    Acknowledged REY MARMOLEJO     Inpatient consult to Social Work  Once     Provider:  (Not yet assigned)    Acknowledged BARRIE RANDALL          Significant Diagnostic Studies: Labs:   BMP: No results for input(s): GLU, NA, K, CL, CO2, BUN, CREATININE, CALCIUM, MG in the last 48 hours., CMP No results for input(s): NA, K, CL, CO2, GLU, BUN, CREATININE, CALCIUM, PROT, ALBUMIN, BILITOT, ALKPHOS, AST, ALT, ANIONGAP, ESTGFRAFRICA, EGFRNONAA in the last 48 hours., CBC No results for input(s): WBC, HGB, HCT, PLT in the last 48 hours., INR   Lab Results   Component Value Date    INR 1.0 08/14/2017    INR 1.0 02/27/2015   , Lipid Panel   Lab Results   Component Value Date    CHOL 128 08/15/2017    HDL 55 08/15/2017    LDLCALC 66.4 08/15/2017    TRIG 33 08/15/2017    CHOLHDL 43.0 08/15/2017   , Troponin   Recent Labs  Lab 08/15/17  0952   TROPONINI 17.641*   , A1C:   Recent Labs  Lab 06/23/17  0847 08/14/17  1227   HGBA1C 10.3* 9.1*    and All labs within the past 24 hours have been reviewed  Radiology: X-Ray: CXR: X-Ray Chest 1 View (CXR): No results found for this visit on 08/14/17.    Cardiac Graphics: Echocardiogram:   2D echo with color flow doppler:   Results for orders placed or performed during the hospital encounter of 08/14/17   2D echo with color flow doppler   Result Value Ref Range    EF 25 (A) 55 - 65    Mitral Valve Regurgitation MILD     Diastolic Dysfunction Yes (A)      Aortic Valve Regurgitation MODERATE (A)     Est. PA Systolic Pressure 51.16 (A)     Mitral Valve Mobility NORMAL     Tricuspid Valve Regurgitation TRIVIAL        Pending Diagnostic Studies:     None          Final Active Diagnoses:    Diagnosis Date Noted POA    PRINCIPAL PROBLEM:  ST elevation myocardial infarction (STEMI) [I21.3] 08/14/2017 Unknown    Cardiogenic shock [R57.0]  Unknown    DM (diabetes mellitus)  [E11.9] 11/08/2014 Yes      Problems Resolved During this Admission:    Diagnosis Date Noted Date Resolved POA     No new Assessment & Plan notes have been filed under this hospital service since the last note was generated.  Service: Cardiology      Discharged Condition: stable    Disposition: Skilled Nursing Facility    Follow Up:    Patient Instructions:     Diet Cardiac     Diet Diabetic 1800 Calories     Activity as tolerated     Call MD for:  severe uncontrolled pain     Call MD for:  persistent nausea and vomiting or diarrhea     Call MD for:  difficulty breathing or increased cough     Call MD for:  persistent dizziness, light-headedness, or visual disturbances     Call MD for:  increased confusion or weakness       Medications:  Transfer Medications (for Discharge Readmit only):   Current Facility-Administered Medications   Medication Dose Route Frequency Provider Last Rate Last Dose    acetaminophen tablet 650 mg  650 mg Oral Q8H PRN Benigno Romo NP   650 mg at 08/16/17 0856    alprazolam tablet 0.25 mg  0.25 mg Oral BID PRN Benigno Romo NP   0.25 mg at 08/14/17 2130    aspirin EC tablet 81 mg  81 mg Oral Daily Benigno Romo NP   81 mg at 08/17/17 0902    atorvastatin tablet 40 mg  40 mg Oral Daily Benigno Romo NP   40 mg at 08/17/17 0902    clopidogrel tablet 75 mg  75 mg Oral Daily Benigno Romo NP   75 mg at 08/17/17 0902    dextrose 50% injection 12.5 g  12.5 g Intravenous PRN Benigno Romo NP        dextrose 50% injection 25 g  25 g  Intravenous PRN Benigno Romo NP   25 g at 08/15/17 0528    glucagon (human recombinant) injection 1 mg  1 mg Intramuscular PRN Benigno Romo NP        glucose chewable tablet 16 g  16 g Oral PRN Benigno Romo NP        glucose chewable tablet 24 g  24 g Oral PRN Benigno Romo NP        hydrocodone-acetaminophen 5-325mg per tablet 1 tablet  1 tablet Oral Q4H PRN Benigno Romo NP        insulin aspart pen 1-10 Units  1-10 Units Subcutaneous QID (AC + HS) PRN Benigno Romo NP   8 Units at 08/17/17 1208    insulin detemir pen 30 Units  30 Units Subcutaneous Daily Benigno Romo NP   30 Units at 08/17/17 0900    levothyroxine tablet 75 mcg  75 mcg Oral QAM Benigno Romo NP   75 mcg at 08/17/17 0643    linagliptin (TRADJENTA) oral tablet  5 mg Oral Daily Benigno Romo NP        loperamide capsule 2 mg  2 mg Oral PRN Benigno Romo NP        lorazepam tablet 1 mg  1 mg Oral Nightly PRN Benigno Romo NP   1 mg at 08/16/17 2138    metoprolol succinate (TOPROL-XL) 24 hr split tablet 12.5 mg  12.5 mg Oral Daily Benigno Romo NP   12.5 mg at 08/17/17 0902    mirtazapine tablet 15 mg  15 mg Oral Nightly PRN Benigno Romo NP   15 mg at 08/15/17 2144    morphine injection 2 mg  2 mg Intravenous Q4H PRN Benigno Romo NP        ondansetron injection 4 mg  4 mg Intravenous Q12H PRN Benigno Romo NP   4 mg at 08/14/17 1639    pantoprazole EC tablet 40 mg  40 mg Oral Daily Benigno Romo NP   40 mg at 08/17/17 0902    polyethylene glycol packet 17 g  17 g Oral BID PRN Benigno Romo NP   17 g at 08/16/17 2147    sodium chloride 0.9% flush 3 mL  3 mL Intravenous Q8H Benigno Romo NP   3 mL at 08/17/17 0642       Time spent on the discharge of patient: 45 minutes    Benigno Romo NP  Cardiology  [facility]

## 2017-08-17 NOTE — PT/OT/SLP PROGRESS
"Physical Therapy  Treatment    Irma Pedroza   MRN: [id number]   Admitting Diagnosis: ST elevation myocardial infarction (STEMI)    PT Received On: 08/17/17  PT Start Time: 1520     PT Stop Time: 1544    PT Total Time (min): 24 min       Billable Minutes:  Gait [id number] and Therapeutic Exercise 10    Treatment Type: Treatment  PT/PTA: PTA     PTA Visit Number: 1       General Precautions: Standard, fall, hearing impaired  Orthopedic Precautions: N/A   Braces:      Do you have any cultural, spiritual, Orthodox conflicts, given your current situation?: none reported to PT    Subjective:  Communicated with RN (Tammi) prior to session.  Pt agreed to tx.  "I am going to rehab soon."    Pain/Comfort  Pain Rating 1: 0/10  Pain Rating Post-Intervention 1: 0/10    Objective:   Patient found with: telemetry, bed alarm    Functional Mobility:  Bed Mobility:   Rolling/Turning Right: Supervision, With side rail  Scooting/Bridging: Supervision (to EOB)  Supine to Sit: Supervision  Sit to Supine: Supervision    Transfers:  Sit <> Stand Assistance: Contact Guard Assistance, Minimum Assistance (2x vc's for hand placement )  Sit <> Stand Assistive Device: Rolling Walker    Gait:   Gait Distance: 68ft x 2 with RW and CGA.  Pt ambulates toe/ball of foot first without heel strike with bouts of unsteadiness noted.  decreased dorsiflexion L > R  Assistance 1: Contact Guard Assistance  Gait Assistive Device: Rolling walker  Gait Pattern: reciprocal  Gait Deviation(s): decreased kamille, decreased step length, decreased stride length, toes first    Stairs:    Balance:   Static Sit: FAIR+: Able to take MINIMAL challenges from all directions  Dynamic Sit: FAIR+: Maintains balance through MINIMAL excursions of active trunk motion  Static Stand: POOR+: Needs MINIMAL assist to maintain without RW  Dynamic stand: FAIR: Needs CONTACT GUARD during gait with RW    Therapeutic Activities and Exercises:  Static/dynamic sitting EOB with S.  Pt " performed BLE therex x 15 reps with two brief rest breaks secondary to decreased strength/endurance - AP, LAQ, hip flexion/ABD/ADD, and glut sets.  AMB as above.  Seated rest break between bouts secondary to mild SOB and fatigue.  O2 sats at end of gait 97 -98% with HR 78-80bpm.       AM-PAC 6 CLICK MOBILITY  How much help from another person does this patient currently need?   1 = Unable, Total/Dependent Assistance  2 = A lot, Maximum/Moderate Assistance  3 = A little, Minimum/Contact Guard/Supervision  4 = None, Modified [place]/Independent    Turning over in bed (including adjusting bedclothes, sheets and blankets)?: 3  Sitting down on and standing up from a chair with arms (e.g., wheelchair, bedside commode, etc.): 3  Moving from lying on back to sitting on the side of the bed?: 3  Moving to and from a bed to a chair (including a wheelchair)?: 3  Need to walk in hospital room?: 3  Climbing 3-5 steps with a railing?: 2  Total Score: 17    AM-PAC Raw Score CMS G-Code Modifier Level of Impairment Assistance   6 % Total / Unable   7 - 9 CM 80 - 100% Maximal Assist   10 - 14 CL 60 - 80% Moderate Assist   15 - 19 CK 40 - 60% Moderate Assist   20 - 22 CJ 20 - 40% Minimal Assist   23 CI 1-20% SBA / CGA   24 CH 0% Independent/ Mod I     Patient left supine with all lines intact, call button in reach and RN present.    Assessment:  Irma Pedroza is a 89 y.o. female with a medical diagnosis of ST elevation myocardial infarction (STEMI) and presents with decreased strength, endurance, and balance.  Pt would continue to benefit from P.T. To address impairments listed below.    Rehab identified problem list/impairments: Rehab identified problem list/impairments: weakness, impaired endurance, impaired self care skills, impaired functional mobilty, gait instability, decreased lower extremity function, decreased ROM    Rehab potential is good.    Activity tolerance: Good    Discharge recommendations: Discharge  Facility/Level Of Care Needs: home with home health, nursing facility, skilled     Barriers to discharge: Barriers to Discharge:  (recommending  supervision.)    Equipment recommendations: Equipment Needed After Discharge: none     GOALS:    Physical Therapy Goals        Problem: Physical Therapy Goal    Goal Priority Disciplines Outcome Goal Variances Interventions   Physical Therapy Goal     PT/OT, PT Ongoing (interventions implemented as appropriate)     Description:  Goals to be met by: 17     Patient will increase functional independence with mobility by performin. Supine to sit with Modified [place]  2. Sit to stand transfer with Supervision  3. Bed to chair transfer with Stand-by Assistance using Rolling Walker  4. Gait  x 100 feet with Stand-by Assistance using Rolling Walker.   5. Lower extremity exercise program x 15 reps per handout, with supervision                      PLAN:    Patient to be seen 6 x/week  to address the above listed problems via gait training, therapeutic activities, therapeutic exercises  Plan of Care expires: 17  Plan of Care reviewed with: patient, family         Elham Huerta, ORALIA  2017

## 2017-08-17 NOTE — PLAN OF CARE
08/17/17 1509   Final Note   Assessment Type Final Discharge Note   Discharge Disposition SNF   Right Care Referral Info   Post Acute Recommendation SNF   Referral Type snf   Facility Name [facility]

## 2017-08-17 NOTE — PROGRESS NOTES
The Sw spoke to Inessa at [facility] who states the doctor will review the pt after she gets out of her meeting. She will call the Sw back with a determination.

## 2017-08-17 NOTE — PROGRESS NOTES
SSC made phone contact with long term access services and completed a level of care eligibility tool (LOCET) for nursing facility admission. Then faxed Level one pasrr screen and determination form to the Office of Aging and Adult Services for nursing facility admission approval.

## 2017-08-17 NOTE — PLAN OF CARE
Problem: Patient Care Overview  Goal: Plan of Care Review  Outcome: Ongoing (interventions implemented as appropriate)  Pt report called to Pat at [facility]. Pt Iv and tele box removed. No distress noted. Pt education and AVS given to maritza Chavarria transport to arrive at 1645.

## 2017-08-17 NOTE — PROGRESS NOTES
[facility]  Cardiology  Progress Note    Patient Name: Irma Pedroza  MRN: [id number]  Admission Date: 8/14/2017  Hospital Length of Stay: 3 days  Code Status: DNR   Attending Physician: Nicho Sandra MD   Primary Care Physician: Loenidas Potts MD  Expected Discharge Date:   Principal Problem:ST elevation myocardial infarction (STEMI)    Subjective:     Hospital Course:   Treated in ED - Plavix and aspirin.   Heparin.  Norepinephrine administered for hypotension.  NSVT on monitoring.  Amiodarone IV.      Long discussion with patient regarding options for treatment.  She is confused, likley due to AMS with shock.  Spoke with NOK - her son, who had also spoken with patient.  After discussing options of emergency cardiac cath vs. Medical management, he feels that she would not desire to proceed with invasive treatments at her age and in her condition, understands high risk of death - 50-70% mortality with shock.    Patient admitted to ICU for further monitoring and stabilization.  TTE shows EF 30% with anterior/apical akinesis.      08/15/2017 No significant events overnight. SR on telemetry with self limiting isolated runs of VT 3-6 beats. Heparin gtt continued. No chest pain, SOB, or nausea. SBP 82-110s. Troponin up to 27 on last check.     08/16/2017 No VT per tele review and no further nausea, chest pain, diuresis. Heparin gtt discontinued yesterday and patient transferred to tele. PT consulted and ambulated patient. CM consulted for discharge planning; patient lives alone and family concerned regarding ability to care for herself. SBP improved in the 110s-130s, BB therapy initiated and tolerated thus far.     08/17/2017 SNF placement pending. VSS and tolerating medical management of CAD without significant side effects. No significant events overnight.       Review of Systems   Constitution: Negative for diaphoresis and malaise/fatigue.   HENT: Negative.    Eyes: Negative.     Cardiovascular: Negative for chest pain, leg swelling, near-syncope, orthopnea, palpitations and paroxysmal nocturnal dyspnea.   Respiratory: Negative.    Endocrine: Negative.    Hematologic/Lymphatic: Negative.    Skin: Negative.    Musculoskeletal: Negative.    Gastrointestinal: Negative.  Negative for nausea and vomiting.   Genitourinary: Negative.    Neurological: Negative.    Psychiatric/Behavioral: Negative.  Negative for altered mental status.   Allergic/Immunologic: Negative.      Objective:     Vital Signs (Most Recent):  Temp: 98.9 °F (37.2 °C) (08/17/17 0821)  Pulse: 66 (08/17/17 0821)  Resp: 16 (08/17/17 0821)  BP: (!) 117/58 (08/17/17 0821)  SpO2: 95 % (08/17/17 0402) Vital Signs (24h Range):  Temp:  [98.1 °F (36.7 °C)-99.3 °F (37.4 °C)] 98.9 °F (37.2 °C)  Pulse:  [64-77] 66  Resp:  [16-24] 16  SpO2:  [94 %-96 %] 95 %  BP: (107-120)/(51-58) 117/58     Weight: 54.5 kg (120 lb 2.4 oz)  Body mass index is 21.98 kg/m².     SpO2: 95 %  O2 Device (Oxygen Therapy): room air      Intake/Output Summary (Last 24 hours) at 08/17/17 1057  Last data filed at 08/17/17 0649   Gross per 24 hour   Intake              856 ml   Output             1950 ml   Net            -1094 ml       Lines/Drains/Airways     Peripheral Intravenous Line                 Peripheral IV - Single Lumen 08/14/17 1230 Right Antecubital 2 days                Physical Exam   Constitutional: She is oriented to person, place, and time. She appears well-developed and well-nourished. No distress.   HENT:   Head: Normocephalic and atraumatic.   Eyes: Right eye exhibits no discharge. Left eye exhibits no discharge.   Neck: No JVD present.   Cardiovascular: Normal rate and regular rhythm.    Murmur heard.   Harsh midsystolic murmur is present with a grade of 3/6  at the upper right sternal border radiating to the neck  Pulmonary/Chest: Effort normal and breath sounds normal.   Abdominal: Soft. Bowel sounds are normal.   Musculoskeletal: She exhibits  no edema.   Neurological: She is alert and oriented to person, place, and time.   Skin: Skin is warm and dry. She is not diaphoretic.   Psychiatric: She has a normal mood and affect. Her behavior is normal. Judgment and thought content normal.       Significant Labs:   BMP:   No results for input(s): GLU, NA, K, CL, CO2, BUN, CREATININE, CALCIUM, MG in the last 48 hours., CMP   No results for input(s): NA, K, CL, CO2, GLU, BUN, CREATININE, CALCIUM, PROT, ALBUMIN, BILITOT, ALKPHOS, AST, ALT, ANIONGAP, ESTGFRAFRICA, EGFRNONAA in the last 48 hours., CBC   No results for input(s): WBC, HGB, HCT, PLT in the last 48 hours., INR   No results for input(s): INR, PROTIME in the last 48 hours., Lipid Panel   No results for input(s): CHOL, HDL, LDLCALC, TRIG, CHOLHDL in the last 48 hours., Troponin   No results for input(s): TROPONINI in the last 48 hours. and All pertinent lab results from the last 24 hours have been reviewed.    Significant Imaging: Echocardiogram:   2D echo with color flow doppler:   Results for orders placed or performed during the hospital encounter of 08/14/17   2D echo with color flow doppler   Result Value Ref Range    EF 25 (A) 55 - 65    Mitral Valve Regurgitation MILD     Diastolic Dysfunction Yes (A)     Aortic Valve Regurgitation MODERATE (A)     Est. PA Systolic Pressure 51.16 (A)     Mitral Valve Mobility NORMAL     Tricuspid Valve Regurgitation TRIVIAL      Assessment and Plan:     Brief HPI: Patient seen this morning on rounds without cardiac complaints. Family at bedside. POC discussed for SNF placement, DC pending acceptance.     DM (diabetes mellitus)     A1c 9.1  Continue accuchecks AC/HS  With SSI  Home insulin resumed         * ST elevation myocardial infarction (STEMI)    Admitted for anterior STEMI with shock.    Patient desires no invasive treatment.  Desires DNR/DNI.  Will monitor in ICU, trend cardiac enzymes, DAPT, high intensity statin.    SBP marginal overnight in the 80s-90s,  improved 08/15/2017 AM in the 110s  Continue heparin gtt for the time being ( discontinued on 08/15/2017 at 1300).   trop peaked at 27, 17 at last check  Isolated self limited runs of VT on tele 3-6 beats  Transferred to tele floor on 08/15/2017   Anticipate adding BB and ACEI if SBP improves; initially required Levo for pressure support   08/16/2017 No further runs of VT per tele review, BB initiated after BP improved to 110s-130s. DAPT continued as well as statin  CM consulted for DC planning   08/17/2017 Tolerating medical management of CAD without significant side effects. VSS, awaiting SNF placement             VTE Risk Mitigation         Ordered     Medium Risk of VTE  Once      08/14/17 1524     Reason for no Mechanical VTE Prophylaxis  Once      08/14/17 1524          Benigno Romo NP  Cardiology  [facility]

## 2017-08-17 NOTE — PROGRESS NOTES
The Sw received a call from Kirstin [facility] w/c [place] [phone] stating she received the trip sheet and a  will arrive at 4:45pm to transport the pt to [facility].     3:54pm The Sw met with the pt's son Albino and he signed the pt choice form. The pt and her son are in agreement with the d/c plan. The Sw placed the form in the pt's chart. The Sw gave the pt's nurse the contact info to call report.

## 2017-08-17 NOTE — PLAN OF CARE
Problem: Physical Therapy Goal  Goal: Physical Therapy Goal  Goals to be met by: 17     Patient will increase functional independence with mobility by performin. Supine to sit with Modified [place]  2. Sit to stand transfer with Supervision  3. Bed to chair transfer with Stand-by Assistance using Rolling Walker  4. Gait  x 100 feet with Stand-by Assistance using Rolling Walker.   5. Lower extremity exercise program x 15 reps per handout, with supervision     Outcome: Ongoing (interventions implemented as appropriate)    Continue working toward goals.

## 2017-08-18 ENCOUNTER — PATIENT OUTREACH (OUTPATIENT)
Dept: ADMINISTRATIVE | Facility: CLINIC | Age: 82
End: 2017-08-18

## 2017-08-18 PROBLEM — Z66 DNR (DO NOT RESUSCITATE): Status: ACTIVE | Noted: 2017-08-18

## 2017-08-18 PROBLEM — I50.42 CHRONIC COMBINED SYSTOLIC AND DIASTOLIC HEART FAILURE: Status: ACTIVE | Noted: 2017-08-18

## 2017-08-18 PROBLEM — R53.81 DEBILITY: Status: ACTIVE | Noted: 2017-08-18

## 2017-08-18 LAB
POCT GLUCOSE: 184 MG/DL (ref 70–110)
POCT GLUCOSE: 243 MG/DL (ref 70–110)
POCT GLUCOSE: 252 MG/DL (ref 70–110)
POCT GLUCOSE: 268 MG/DL (ref 70–110)
POCT GLUCOSE: 314 MG/DL (ref 70–110)
POCT GLUCOSE: 344 MG/DL (ref 70–110)

## 2017-08-18 PROCEDURE — 97110 THERAPEUTIC EXERCISES: CPT

## 2017-08-18 PROCEDURE — 97116 GAIT TRAINING THERAPY: CPT

## 2017-08-18 PROCEDURE — 97535 SELF CARE MNGMENT TRAINING: CPT

## 2017-08-18 PROCEDURE — 25000003 PHARM REV CODE 250: Performed by: NURSE PRACTITIONER

## 2017-08-18 PROCEDURE — 99305 1ST NF CARE MODERATE MDM 35: CPT | Mod: ,,, | Performed by: INTERNAL MEDICINE

## 2017-08-18 PROCEDURE — 63600175 PHARM REV CODE 636 W HCPCS: Performed by: INTERNAL MEDICINE

## 2017-08-18 PROCEDURE — 97161 PT EVAL LOW COMPLEX 20 MIN: CPT

## 2017-08-18 PROCEDURE — 97530 THERAPEUTIC ACTIVITIES: CPT

## 2017-08-18 PROCEDURE — 97165 OT EVAL LOW COMPLEX 30 MIN: CPT

## 2017-08-18 PROCEDURE — 25000003 PHARM REV CODE 250: Performed by: INTERNAL MEDICINE

## 2017-08-18 PROCEDURE — 12000000 HC SNF SEMI-PRIVATE ROOM

## 2017-08-18 RX ORDER — PANTOPRAZOLE SODIUM 40 MG/1
40 TABLET, DELAYED RELEASE ORAL DAILY
Status: DISCONTINUED | OUTPATIENT
Start: 2017-08-18 | End: 2017-08-25 | Stop reason: HOSPADM

## 2017-08-18 RX ORDER — ENOXAPARIN SODIUM 100 MG/ML
30 INJECTION SUBCUTANEOUS EVERY 24 HOURS
Status: DISCONTINUED | OUTPATIENT
Start: 2017-08-18 | End: 2017-08-25 | Stop reason: HOSPADM

## 2017-08-18 RX ORDER — CLOPIDOGREL BISULFATE 75 MG/1
75 TABLET ORAL DAILY
Status: DISCONTINUED | OUTPATIENT
Start: 2017-08-18 | End: 2017-08-25 | Stop reason: HOSPADM

## 2017-08-18 RX ORDER — LEVOTHYROXINE SODIUM 75 UG/1
75 TABLET ORAL
Status: DISCONTINUED | OUTPATIENT
Start: 2017-08-19 | End: 2017-08-25 | Stop reason: HOSPADM

## 2017-08-18 RX ORDER — ATORVASTATIN CALCIUM 20 MG/1
40 TABLET, FILM COATED ORAL DAILY
Status: DISCONTINUED | OUTPATIENT
Start: 2017-08-18 | End: 2017-08-25 | Stop reason: HOSPADM

## 2017-08-18 RX ORDER — POLYETHYLENE GLYCOL 3350 17 G/17G
17 POWDER, FOR SOLUTION ORAL DAILY
Status: DISCONTINUED | OUTPATIENT
Start: 2017-08-18 | End: 2017-08-25 | Stop reason: HOSPADM

## 2017-08-18 RX ORDER — AMOXICILLIN 250 MG
1 CAPSULE ORAL 2 TIMES DAILY PRN
Status: DISCONTINUED | OUTPATIENT
Start: 2017-08-18 | End: 2017-08-25 | Stop reason: HOSPADM

## 2017-08-18 RX ORDER — IBUPROFEN 200 MG
16 TABLET ORAL
Status: DISCONTINUED | OUTPATIENT
Start: 2017-08-18 | End: 2017-08-25 | Stop reason: HOSPADM

## 2017-08-18 RX ORDER — GLUCAGON 1 MG
1 KIT INJECTION
Status: DISCONTINUED | OUTPATIENT
Start: 2017-08-18 | End: 2017-08-25 | Stop reason: HOSPADM

## 2017-08-18 RX ORDER — POLYETHYLENE GLYCOL 3350 17 G/17G
17 POWDER, FOR SOLUTION ORAL 2 TIMES DAILY PRN
Status: DISCONTINUED | OUTPATIENT
Start: 2017-08-18 | End: 2017-08-25 | Stop reason: HOSPADM

## 2017-08-18 RX ORDER — ASPIRIN 81 MG/1
81 TABLET ORAL DAILY
Status: DISCONTINUED | OUTPATIENT
Start: 2017-08-18 | End: 2017-08-25 | Stop reason: HOSPADM

## 2017-08-18 RX ORDER — ONDANSETRON 4 MG/1
4 TABLET, ORALLY DISINTEGRATING ORAL EVERY 6 HOURS PRN
Status: DISCONTINUED | OUTPATIENT
Start: 2017-08-18 | End: 2017-08-25 | Stop reason: HOSPADM

## 2017-08-18 RX ORDER — INSULIN ASPART 100 [IU]/ML
0-5 INJECTION, SOLUTION INTRAVENOUS; SUBCUTANEOUS
Status: DISCONTINUED | OUTPATIENT
Start: 2017-08-18 | End: 2017-08-25 | Stop reason: HOSPADM

## 2017-08-18 RX ORDER — IBUPROFEN 200 MG
24 TABLET ORAL
Status: DISCONTINUED | OUTPATIENT
Start: 2017-08-18 | End: 2017-08-25 | Stop reason: HOSPADM

## 2017-08-18 RX ADMIN — INSULIN ASPART 3 UNITS: 100 INJECTION, SOLUTION INTRAVENOUS; SUBCUTANEOUS at 12:08

## 2017-08-18 RX ADMIN — RAMELTEON 8 MG: 8 TABLET, FILM COATED ORAL at 09:08

## 2017-08-18 RX ADMIN — PANTOPRAZOLE SODIUM 40 MG: 40 TABLET, DELAYED RELEASE ORAL at 09:08

## 2017-08-18 RX ADMIN — INSULIN DETEMIR 20 UNITS: 100 INJECTION, SOLUTION SUBCUTANEOUS at 09:08

## 2017-08-18 RX ADMIN — INSULIN ASPART 1 UNITS: 100 INJECTION, SOLUTION INTRAVENOUS; SUBCUTANEOUS at 09:08

## 2017-08-18 RX ADMIN — ENOXAPARIN SODIUM 30 MG: 100 INJECTION SUBCUTANEOUS at 05:08

## 2017-08-18 RX ADMIN — CLOPIDOGREL BISULFATE 75 MG: 75 TABLET ORAL at 09:08

## 2017-08-18 RX ADMIN — POLYETHYLENE GLYCOL 3350 17 G: 17 POWDER, FOR SOLUTION ORAL at 05:08

## 2017-08-18 RX ADMIN — STANDARDIZED SENNA CONCENTRATE AND DOCUSATE SODIUM 1 TABLET: 8.6; 5 TABLET, FILM COATED ORAL at 09:08

## 2017-08-18 RX ADMIN — INSULIN ASPART 4 UNITS: 100 INJECTION, SOLUTION INTRAVENOUS; SUBCUTANEOUS at 09:08

## 2017-08-18 RX ADMIN — ATORVASTATIN CALCIUM 40 MG: 20 TABLET, FILM COATED ORAL at 09:08

## 2017-08-18 RX ADMIN — ACETAMINOPHEN 650 MG: 325 TABLET ORAL at 09:08

## 2017-08-18 RX ADMIN — ASPIRIN 81 MG: 81 TABLET, COATED ORAL at 09:08

## 2017-08-18 RX ADMIN — INSULIN ASPART 4 UNITS: 100 INJECTION, SOLUTION INTRAVENOUS; SUBCUTANEOUS at 05:08

## 2017-08-18 NOTE — NURSING
"This  Nurse was approached by patient daughter regarding concerns of medications that was not ordered properly by transferring physician on 8/17/17. This nurse reported to duty on 8/18/17 @ 0644 and assumed care for this patient who was very pleasant. The daughter of this patient concerns were regarding medications not ordered and this nurse response to her was, " I do understand your concerns, but I just received this patient into my assignment and I will inform my charge nurse and Dr.. 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 Coulter was informed all of the concerned medications was ordered like right away and the daughter was shown the record as this nurse attempted to acknowledged the orders. "I informed the daughter once the medication was verified by pharmacy that I will give them to the patient." This nurse informed her charge nurse by speclink that Dr. Coulter was in the record and ordering the medications as asked because I had been hasseled about the process in which I, this nurse did not have any control over." The daughter removed herself from the room to report this matter. This nurse reported the matter to charge nurse on duty and informed my UD that I would like to speak to her.  "

## 2017-08-18 NOTE — ASSESSMENT & PLAN NOTE
No further N/V  Continue medical therapy with ASA, clopidogrel, atorvastatin, metoprolol  Continue GI prophylaxis with pantoprazole

## 2017-08-18 NOTE — H&P
[facility]  Department of Hospital Medicine  History & Physical    Patient Name: Irma Pedroza  MRN: [id number]  Code Status: DNR  Admission Date: 8/17/2017  Attending Physician: Alessandra Coulter MD   Primary Care Provider: Leonidas Potts MD    Subjective:     Principal Problem:Debility       HPI: Chief Complaint/Reason for Admission: Debility    History of Present Illness:  Patient is a 89 y.o. female with DM2, hypothyroidism, HLP who presents to SNF after hospitalization at [facility] for STEMI with shock after developing N/V and malaise.  The patient did not wish for invasive therapy and was made DNR.  She was started on heparin and norepinephrine drip and now DAPT, high dose statin. She did receive amiodarone drip due to NSVT on telemetry.  The patient as confirmed by her daughter wishes to be DNR.  She denies any chest pain, N/V or dyspnea.  She also denies any musculoskeletal pain.  She has bee sleeping poorly and wishes to resume her lorazepam as she takes at home.  Her daughter has brought her Tradjenta which will be resumed.      The patient has been admitted to SNF for ongoing PT/OT due to insufficient progress to go home safely from the hospital.    Records from last hospital stay reviewed and summarized above.     Past Medical History:   Diagnosis Date    Anxiety     Depression     Diabetes mellitus, type 2     Hyperlipidemia     Thyroid disease        Past Surgical History:   Procedure Laterality Date    BLADDER SUSPENSION      CATARACT EXTRACTION      HYSTERECTOMY      ORIF CONGENITAL HIP DISLOCATION         Review of patient's allergies indicates:  No Known Allergies    Current Facility-Administered Medications on File Prior to Encounter   Medication    [DISCONTINUED] acetaminophen tablet 650 mg    [DISCONTINUED] alprazolam tablet 0.25 mg    [DISCONTINUED] aspirin EC tablet 81 mg    [DISCONTINUED] atorvastatin tablet 40 mg    [DISCONTINUED] clopidogrel tablet 75  mg    [DISCONTINUED] dextrose 50% injection 12.5 g    [DISCONTINUED] dextrose 50% injection 25 g    [DISCONTINUED] glucagon (human recombinant) injection 1 mg    [DISCONTINUED] glucose chewable tablet 16 g    [DISCONTINUED] glucose chewable tablet 24 g    [DISCONTINUED] hydrocodone-acetaminophen 5-325mg per tablet 1 tablet    [DISCONTINUED] insulin aspart pen 1-10 Units    [DISCONTINUED] insulin detemir pen 30 Units    [DISCONTINUED] levothyroxine tablet 75 mcg    [DISCONTINUED] linagliptin (TRADJENTA) oral tablet    [DISCONTINUED] loperamide capsule 2 mg    [DISCONTINUED] lorazepam tablet 1 mg    [DISCONTINUED] metoprolol succinate (TOPROL-XL) 24 hr split tablet 12.5 mg    [DISCONTINUED] mirtazapine tablet 15 mg    [DISCONTINUED] morphine injection 2 mg    [DISCONTINUED] ondansetron injection 4 mg    [DISCONTINUED] pantoprazole EC tablet 40 mg    [DISCONTINUED] polyethylene glycol packet 17 g    [DISCONTINUED] sodium chloride 0.9% flush 3 mL     Current Outpatient Prescriptions on File Prior to Encounter   Medication Sig    alprazolam (XANAX) 0.25 MG tablet TAKE 1 TABLET BY MOUTH TWICE DAILY AS NEEDED FOR ANXIETY    atorvastatin (LIPITOR) 40 MG tablet Take 1 tablet (40 mg total) by mouth once daily.    CONTOUR TEST STRIPS Strp USE TO TEST THREE TIMES DAILY    LANTUS SOLOSTAR 100 unit/mL (3 mL) InPn pen INJECT 30 UNITS EVERY MORNING    levothyroxine (SYNTHROID) 75 MCG tablet TAKE 1 TABLET BY MOUTH EVERY MORNING    lorazepam (ATIVAN) 1 MG tablet TAKE 1 TO 2 TABLETS BY MOUTH EVERY NIGHT AT BEDTIME    losartan (COZAAR) 50 MG tablet TAKE 1/2 TO 1 TABLET BY MOUTH EVERY DAY (Patient taking differently: TAKE 1 TABLET BY MOUTH EVERY DAY)    multivitamin capsule Take 1 capsule by mouth once daily.    TRADJENTA 5 mg Tab tablet TAKE 1 TABLET BY MOUTH DAILY    vitamin D 1000 units Tab Take 2,000 Units by mouth once daily.     aspirin (ECOTRIN) 81 MG EC tablet Take 1 tablet (81 mg total) by mouth  once daily.    clopidogrel (PLAVIX) 75 mg tablet Take 1 tablet (75 mg total) by mouth once daily.    metoprolol succinate (TOPROL-XL) 25 MG 24 hr tablet Take 0.5 tablets (12.5 mg total) by mouth once daily.    mirtazapine (REMERON) 15 MG tablet Take 15 mg by mouth every evening.     Family History     Problem Relation (Age of Onset)    Alzheimer's disease Mother    Cancer Father    Heart disease Mother        Social History Main Topics    Smoking status: Never Smoker    Smokeless tobacco: Not on file    Alcohol use No    Drug use: Unknown    Sexual activity: Not on file     Review of Systems   Constitutional: no fever or chills  Eyes: no visual changes  ENT: no nasal congestion or sore throat  Respiratory: no cough or shortness of breath  Cardiovascular: no chest pain or palpitations  Gastrointestinal: no nausea or vomiting, no abdominal pain; last BM: 8/17  Genitourinary: no hematuria or dysuria  Integument/Breast: no rash or pruritis  Hematologic/Lymphatic: no easy bruising or lymphadenopathy  Allergy/Immunology: no postnasal drip  Musculoskeletal: no arthralgias or myalgias  Neurological: no seizures or tremors  Behavioral/Psych: no auditory or visual hallucinations  Endocrine: no heat or cold intolerance    Objective:     Vital Signs (Most Recent):  Temp: 97.9 °F (36.6 °C) (08/18/17 0800)  Pulse: 66 (08/18/17 0800)  Resp: 18 (08/18/17 0800)  BP: (!) 104/50 (08/18/17 0800)  SpO2: (!) 94 % (08/18/17 0800) Vital Signs (24h Range):  Temp:  [97.9 °F (36.6 °C)-98.6 °F (37 °C)] 97.9 °F (36.6 °C)  Pulse:  [66-78] 66  Resp:  [16-18] 18  SpO2:  [94 %-96 %] 94 %  BP: (104-138)/(50-61) 104/50     Weight: 54.1 kg (119 lb 4.3 oz)  Body mass index is 21.81 kg/m².    Physical Exam  General: well developed, well nourished, no distress, seated in wheelchair  HENT: Head:normocephalic, atraumatic. Ears:bilateral external ear canals normal. Nose: Nares normal. Septum midline. Mucosa normal. Throat: lips, mucosa, and tongue  normal and no throat erythema.  Eyes: conjunctivae/corneas clear.  EOM normal  Neck: supple, symmetrical, trachea midline  Lungs:  clear to auscultation bilaterally and normal respiratory effort  Cardiovascular: Heart: regular rate and rhythm, S1, S2 normal, no murmur, click, rub or gallop. Chest Wall: no tenderness. Extremities: no cyanosis, no edema, no clubbing. Pulses: 2+ and symmetric.  Abdomen/Rectal: Abdomen: soft, non-tender non-distended; bowel sounds normal; no masses,  no organomegaly.   Skin: Skin color, texture, turgor normal. Bruising to left arm and left shin evident  Musculoskeletal:no clubbing, cyanosis  Lymph Nodes: No cervical or supraclavicular adenopathy  Neurologic: Normal strength and tone. No focal numbness or weakness  Psych/Behavioral:  Alert and oriented, appropriate affect.    Significant Labs:     Recent Labs  Lab 08/14/17  1227 08/15/17  0305   WBC 7.19 8.81  8.81  8.81   HGB 12.8 12.3  12.3  12.3   HCT 37.9 36.1*  36.1*  36.1*    202  202  202       Recent Labs  Lab 08/14/17  1227 08/15/17  0305    136  136   K 3.7 3.6  3.6    108  108   CO2 20* 22*  22*   BUN 26 28  28   CREATININE 0.9 0.8  0.8   CALCIUM 8.1* 8.2*  8.2*   PROT 5.4* 5.2*  5.2*   BILITOT 0.4 0.5  0.5   ALKPHOS 66 87  87   ALT 68* 198*  198*   AST 96* 246*  246*         Assessment/Plan:     DNR (do not resuscitate)    Discussed with patient and daughter  Paperwork completed.         Chronic combined systolic and diastolic heart failure    Currently compensated  Continue medical therapy with metoprolol and resume losartan as BP tolerates  Monitor with daily weights        ST elevation myocardial infarction (STEMI)    No further N/V  Continue medical therapy with ASA, clopidogrel, atorvastatin, metoprolol  Continue GI prophylaxis with pantoprazole          DM (diabetes mellitus)     Hemoglobin A1C   Date Value Ref Range Status   08/14/2017 9.1 (H) 4.0 - 5.6 % Final     Comment:    06/23/2017 10.3 (H) 4.0 - 5.6 % Final     Comment:   12/16/2016 9.4 (H) 4.5 - 6.2 % Final     Comment:   Poorly controlled  Continue tradjenta with levemir and SSNI prn hyperglycemia  Continue diabetic diet  -will continue to monitor and adjust regimen as necessary          Anxiety    Patient chronically on lorazepam and alprazolam at home  Will resume lorazepam at night as she has had difficulty sleeping  Hold alprazolam as patient without any anxious symptoms currently  Will continue to monitor.        * Debility    -continue PT/OT to increase ambulation, ADL performance and endurance  -continue enoxaparin for DVT prophylaxis  -continue fall precautions  -continue miralax as she takes at home to prevent constipation; hold for frequent or loose stooling          Patient's care plan and discharge planning will be discussed by the SNF team in IDT meeting weekly. Medications to be reviewed and discussed with the SNF unit clinical pharmacist.    Future Appointments  Date Time Provider Department Center   9/5/2017 8:40 AM Nicho Sandra MD [facility] CARDIO [facility]         Alessandra Coulter MD  Department of Hospital Medicine  [facility]

## 2017-08-18 NOTE — ASSESSMENT & PLAN NOTE
Hemoglobin A1C   Date Value Ref Range Status   08/14/2017 9.1 (H) 4.0 - 5.6 % Final     Comment:   06/23/2017 10.3 (H) 4.0 - 5.6 % Final     Comment:   12/16/2016 9.4 (H) 4.5 - 6.2 % Final     Comment:   Poorly controlled  Continue tradjenta with levemir and SSNI prn hyperglycemia  Continue diabetic diet  -will continue to monitor and adjust regimen as necessary

## 2017-08-18 NOTE — PT/OT/SLP EVAL
Occupational Therapy  Evaluation/treatment    Irma Pedroza   MRN: [id number]   Admitting Diagnosis: Debility     OT Date of Treatment: 08/18/17   OT Start Time: 0747  OT Stop Time: 0828  OT Total Time (min): 41 min    Billable Minutes:  Evaluation 11  Self Care/Home Management 30    Diagnosis: Debility    Past Medical History:   Diagnosis Date    Anxiety     Depression     Diabetes mellitus, type 2     Hyperlipidemia     Thyroid disease       Past Surgical History:   Procedure Laterality Date    BLADDER SUSPENSION      CATARACT EXTRACTION      HYSTERECTOMY      ORIF CONGENITAL HIP DISLOCATION           General Precautions: Standard, fall, hearing impaired  Orthopedic Precautions: N/A  Braces: N/A    Do you have any cultural, spiritual, Jain conflicts, given your current situation?: [employer]     Patient History:  Lives With: alone  Living Arrangements: house  Home Accessibility: stairs to enter home  Home Layout: Able to live on 1st floor  Number of Stairs to Enter Home: 1 (threshold step)  Stair Railings at Home: none  Transportation Available: family or friend will provide  Equipment Currently Used at Home: bedside commode, wheelchair, walker, rolling, shower chair    Prior level of function:   Bed Mobility/Transfers: needs device (uses RW)  Grooming: independent  Bathing: needs device (uses shower chair)  Upper Body Dressing: independent  Lower Body Dressing: independent  Toileting: independent  Home Management Skills:  (Pt. reported that she has a lady that cleans her house)  Homemaking Responsibilities: No  Driving License: No  Leisure and Hobbies: crossword puzzles. TV, reading  IADL Comments: Per pt. report she lives alone in SS house with threshold step to enterr.  She was ambulating with a RW and performing her own ADL tasks including showering on shower chair.  Pt. 's son lives down the street and cooks for pt. and a lady cleans her house.      Dominant hand:  right    Subjective:  Communicated with nurse prior to session.    Chief Complaint: some discomfort in lower back  Patient/Family stated goals: To get better to return home and live like before    Pain/Comfort  Pain Rating 1: 3/10  Location 1: back  Pain Rating Post-Intervention 1:  (appeared morte comfortable in chair )    Objective:   Patient found with:  (supine in bed)    Cognitive Exam:  Oriented to: Person, Place, Time and Situation  Follows Commands/attention: Follows multistep  commands  Communication: clear/fluent but [place]  Memory:  Intact   Safety awareness/insight to disability: intact  Coping skills/emotional control: Appropriate to situation    Visual/perceptual:  Wears glasses    Physical Exam:  Postural examination/scapula alignment: Rounded shoulder, Head forward and Posterior pelvic tilt  Skin integrity: Bruising of Left  dorsal forearm and left shin  Edema: Mild left dorsal forearm    Sensation:   Intact    Upper Extremity Range of Motion:  Right Upper Extremity: WFL  Left Upper Extremity: WFL    Upper Extremity Strength:  Right Upper Extremity: WFL  Left Upper Extremity: WFL   Strength: good    Fine motor coordination:   Difficulty opening containers    Gross motor coordination: WFL    Functional Status:  MDS G  Bed Mobility Functional Status: Min (A)  Transfer Functional Status: Min (A) sit to stand and SPT from bed to w/c  Dressing Functional Status: 2:Min (A) LBD able to don socks seated in w/c and pants but required Min A for balance when managing over her hips in stand/UBD SBA  Eating Functional Status: Set Up A  Personal Hygiene Functional Status: SBA seated EOB to clean teeth and wash face  Bathing Functional Status: Min (A) sponge bath when standing to clean sancho area  Eval Only: Number of U/E limb <4/5 MMT: 0  Moving from seated to standing position: Not steady, only able to stabilize with staff assistance  Surface-to-surface transfer (transfer between bed and chair or wheelchair): Not  "steady, only able to stabilize with staff assistance       AM-PAC 6 CLICK ADL  How much help from another person does this patient currently need?  1 = Unable, Total/Dependent Assistance  2 = A lot, Maximum/Moderate Assistance  3 = A little, Minimum/Contact Guard/Supervision  4 = None, Modified [place]/Independent     Putting on and taking off regular lower body clothing? : 3  Bathing (including washing, rinsing, drying)?: 3  Toileting, which includes using toilet, bedpan, or urinal? : 3  Putting on and taking off regular upper body clothing?: 3  Taking care of personal grooming such as brushing teeth?: 3  Eating meals?: 3  Total Score: 18     AM-PAC Raw Score CMS "G-Code Modifier Level of Impairment Assistance   6 % Total / Unable   7 - 9 CM 80 - 100% Maximal Assist   10 - 14 CL 60 - 80% Moderate Assist   15 - 19 CK 40 - 60% Moderate Assist   20 - 22 CJ 20 - 40% Minimal Assist   23 CI 1-20% SBA / CGA   24 CH 0% Independent/ Mod I              Additional Treatment:  Pt. Educated on role of OT and POC  Pt. Educated on safety with ADL tasks   Pt. Educated on need to hyun for assist to get up    Patient left up in chair with call button in reach    Assessment:  Irma Pedroza is a 89 y.o. female with a medical diagnosis of Debility and presents with deficits in self-care skills, functional mobility as well as endurance.  Pt. Lives alone and would benefit from continued OT services.   Pt evaluation falls under low complexity for evaluation coding due to performance deficits noted in 3 areas as stated above and 0 co-morbities affecting current functional status.  Only brief occupational profile and history review completed.   .    Rehab identified problem list/impairments: impaired endurance, impaired self care skills, impaired functional mobilty, gait instability, impaired skin, pain    Rehab potential is good    Activity tolerance: Good    Discharge recommendations: home health OT (with supervision) "     Barriers to discharge:       Equipment recommendations: none     GOALS:    Occupational Therapy Goals        Problem: Occupational Therapy Goal    Goal Priority Disciplines Outcome Interventions   Occupational Therapy Goal     OT, PT/OT     Description:  Goals to be met by: 12 days     Patient will increase functional independence with ADLs by performing:    Feeding with Modified [place].  UE Dressing with Modified [place].  LE Dressing with Supervision.  Grooming while standing with Supervision.  Toileting from bedside commode with Supervision for hygiene and clothing management.   Bathing from  shower chair/bench with Supervision.  Supine to sit with Modified [place].  Stand pivot transfers with Supervision.  Toilet transfer to bedside commode with Supervision.  Pt. To be independent with HEP                      PLAN: Patient to be seen 5 x/week to address the above listed problems via self-care/home management, therapeutic activities, therapeutic exercises  Plan of Care expires: 09/17/17  Plan of Care reviewed with: patient    ERIK Pineda  08/18/2017

## 2017-08-18 NOTE — NURSING
Pt came in wheelchair via Transport system. AOx4, afebrile, not in cp distress, calm, cooperative with care and denies pain. No active skin breakdown noted. Bruising noted on four extremities. 1 person assist with transfer. Will continue to monitor for pain and safety.

## 2017-08-18 NOTE — ASSESSMENT & PLAN NOTE
-continue PT/OT to increase ambulation, ADL performance and endurance  -continue enoxaparin for DVT prophylaxis  -continue fall precautions  -continue miralax as she takes at home to prevent constipation; hold for frequent or loose stooling

## 2017-08-18 NOTE — PLAN OF CARE
Problem: Physical Therapy Goal  Goal: Physical Therapy Goal  Goals to be met by: 9 days     Patient will increase functional independence with mobility by performin. Supine to sit with Modified [place]  2. Sit to supine with Modified [place]  3. Sit to stand transfer with Supervision  4. Bed to chair transfer with Supervision using Rolling Walker  5. Gait  x 150 feet with Supervision using Rolling Walker.   6. Ascend/Descend 4 inch curb step with Stand-by Assistance using Rolling Walker.  7. Stand for 3 minutes with Stand-by Assistance using Rolling Walker while performing dynamic standing activity  8. Lower extremity exercise program x20 reps per handout, with assistance as needed  9. Pt will improve TUG time to <30s w/ RW in order to decrease fall risk    PT eval completed. Pt will begin PT POC.    Ele Cartwright, PT  2017

## 2017-08-18 NOTE — HOSPITAL COURSE
The patient was admitted at [facility] from 8/14 to 8/17/2017.  8/17/17: Patient admitted to SNF for ongoing PT/OT following a hospitalization for STEMI.  8/21/17: Patient seen at bedside, denies chest pain, labs reviewed  8/23/17: Patient sob overnight, placed on O2 given predinsone and resp treatments. This AM patient sitting on side of bed bathing with mild dyspnea, O2 in use, reports feeling better than last night. CXR with pulmonary edema, BNP 2200s, patient on Lasix, Patient this afternoon sitting on side of bed without O2 with normal resp rate. Patient reports being comfortable.  8/24/17: Patient became SOB overnight, telemedicine was used by on call provider and nursing staff was instructed to send patient to ED.

## 2017-08-18 NOTE — PT/OT/SLP DISCHARGE
Physical Therapy Discharge Summary    Irma Pedroza  MRN: [id number]   ST elevation myocardial infarction (STEMI)   Patient Discharged from acute Physical Therapy on 2017.  Please refer to prior PT noted date on 2017 for functional status.     Assessment:   Patient appropriate for care in another setting.  GOALS:    Physical Therapy Goals     Not on file          Multidisciplinary Problems (Resolved)        Problem: Physical Therapy Goal    Goal Priority Disciplines Outcome Goal Variances Interventions   Physical Therapy Goal   (Resolved)     PT/OT, PT Outcome(s) achieved     Description:  Goals to be met by: 17     Patient will increase functional independence with mobility by performin. Supine to sit with Modified [place]  2. Sit to stand transfer with Supervision  3. Bed to chair transfer with Stand-by Assistance using Rolling Walker  4. Gait  x 100 feet with Stand-by Assistance using Rolling Walker.   5. Lower extremity exercise program x 15 reps per handout, with supervision                    Reasons for Discontinuation of Therapy Services  Transfer to alternate level of care.      Plan:  Patient Discharged to: Skilled Nursing Facility.

## 2017-08-18 NOTE — ASSESSMENT & PLAN NOTE
Currently compensated  Continue medical therapy with metoprolol and resume losartan as BP tolerates  Monitor with daily weights

## 2017-08-18 NOTE — PT/OT/SLP EVAL
"PhysicalTherapy   Evaluation    Irma Pedroza   MRN: [id number]     PT Received On: 08/18/17  PT Start Time: 1000     PT Stop Time: 1050    PT Total Time (min): 50 min       Billable Minutes:  Evaluation 10, Gait [id number], Therapeutic Activity 10, Therapeutic Exercise 20 and Total Time 40    Diagnosis: Debility s/p STEMI 8/14  Past Medical History:   Diagnosis Date    Anxiety     Depression     Diabetes mellitus, type 2     Hyperlipidemia     Thyroid disease       Past Surgical History:   Procedure Laterality Date    BLADDER SUSPENSION      CATARACT EXTRACTION      HYSTERECTOMY      ORIF CONGENITAL HIP DISLOCATION           General Precautions: Standard, fall, hearing impaired  Orthopedic Precautions: N/A   Braces: N/A    Do you have any cultural, spiritual, Scientology conflicts, given your current situation?: Lutheran    Patient History:  Lives With: alone  Living Arrangements: house  Home Accessibility: stairs to enter home  Home Layout: Able to live on 1st floor  Number of Stairs to Enter Home: 1  Stair Railings at Home: none  Transportation Available: family or friend will provide  Living Environment Comment: Pt lives alone in [place] in a 1SH w/ a threshold to enter. Pt has a walk-in shower w/ a grab bar and shower chair. Pt's son and daughter-in-law live 2 blocks away and check on pt often. She also bring her dinner every night. They are both retired and able to assist pt as needed upon D/C.  Equipment Currently Used at Home: bedside commode, grab bar, shower chair, walker, rolling  DME owned (not currently used): wheelchair    Previous Level of Function: Pt PTA was [place] using a RW for all mobility. She used her shower chair and grab bar for showering. Pt reports 1 fall that was 3 years ago caused by a LOB. She denied dizziness.   Ambulation Skills: needs device  Transfer Skills: needs device  ADL Skills: needs device    Subjective:  Communicated with OT prior to session.  "I feel woobly."  Chief " Complaint: decreased IND'ce  Patient goals: to return to PLOF    Pain/Comfort  Pain Rating 1: 8/10  Location - Side 1: Bilateral  Location - Orientation 1: generalized  Location 1: back  Pain Addressed 1: Pre-medicate for activity, Reposition  Pain Rating Post-Intervention 1: 8/10    Objective:  Patient found sitting in w/c      Cognitive Exam:  Oriented to: Person, Place, Time and Situation  Follows Commands/attention: Follows two-step commands  Communication: clear/fluent  Safety awareness/insight to disability: intact    Physical Exam:  Postural examination/scapula alignment: Rounded shoulder and Head forward    Skin integrity: Visible skin intact  Edema: None noted     Sensation:   Impaired  light/touch hands/fingers and feet/toes    Upper Extremity Range of Motion:  Right Upper Extremity: WFL  Left Upper Extremity: WFL    Upper Extremity Strength:  Right Upper Extremity: WFL  Left Upper Extremity: WFL    Lower Extremity Range of Motion:  Right Lower Extremity: WFL  Left Lower Extremity: WFL    Lower Extremity Strength:  Right Lower Extremity: WFL except DF 2+/5  Left Lower Extremity: WFL except DF 2+/5    Functional Status:  MDS G  Bed Mobility Functional Status: S-SBA  Transfer Functional Status: CGA-Min (A)  Walk in Room Functional Status: CGA-Min (A)  Walk in Corridor Functional Status: CGA-Min (A)  Locomotion on Unit Functional Status: total(A)  Eval Only: Number of L/E limb <4/5 MMT: 0  Moving from seated to standing position: Not steady, only able to stabilize with staff assistance  Walking (with assistive device if used): Not steady, only able to stabilize with staff assistance  Turning around and facing the opposite direction while walking: Not steady, only able to stabilize with staff assistance  Surface-to-surface transfer (transfer between bed and chair or wheelchair): Not steady, only able to stabilize with staff assistance    MDS GG  Sit to lying Performance: Supervision or touching  assistance.  Lying to sitting on side of bed Performance: Supervision or touching assistance.  Sit to Stand Performance: Supervision or touching assistance.  Chair/bed-to-chair transfer Performance: Supervision or touching assistance., See goal below  Chair/bed-to-chair transfer Goal: Setup or clean-up assistance  Does the resident walk?: Yes --> Continue to Walk 50 feet with two turns assessment  Walk 50 feet with two turns Performance: Supervision or touching assistance.  Walk 150 feet Performance: Not attempted due to medical condition or safety concerns  Does the resident use a wheelchair/scooter?: No --> Skip to Self Care    Timed Up & Go Test (TUG)  Instructions to the patient:   From sitting in a chair, stand up, walk 3 meters, turn around, walk back, and sit down  Scoring:   Assistive Device and/or Bracing Used: Rolling Walker  TUG Time: 48 seconds   Community Dwelling Older Adult = > 13.5 sec. are associated with high fall risk     Bed Mobility:  Sit>Supine:on mat w/ SPV  Supine>Sit: on mat w/ SPV    Transfers:  Sit<>Stand: to/from w/c (x2 trials) w/ RW and CGA for safety  Stand Pivot Transfer: w/c<>EOM w/ RW and CGA  Cueing for hand placement    Gait:  Amb 67ft w/ RW and CGA for safety, pt externally rotates LLE t/o gait cycle, she also demo's (B) hip hiking and inc'd HF in order to clear floor - due to DF weakness bilaterally     Therex:  Supine therex 2x10 reps (GS, SAQ, AP w/ AAROM)    Balance:  Static stand w/ RW and CGA for safety, prior to amb trial    Additional Treatment:  Seated LBE x15min to inc BLE strength and endurance    Patient left up in chair with call button in reach.    Assessment:  Irma Pedroza is a 89 y.o. female with a medical diagnosis of Debility.  Pt presents w/ previous pertinent co-morbidities and personal factors including neuropathy in feet and hands along w/ OA causing pain. Pt currently presents w/ the below mentioned deficits requiring CGA for transfers and amb w/ RW.  Pt's clinical presentation is uncomplicated. According to the TUG pt is a high fall risk. These factors classify pt as a low complexity evaluation. Pt is appropriate for skilled PT and will begin PT POC.    Rehab identified problem list/impairments: weakness, impaired endurance, impaired sensation, impaired functional mobilty, gait instability, impaired balance, decreased lower extremity function, pain    Rehab potential is good.    Activity tolerance: Good    Discharge recommendations: home with home health     Barriers to discharge: None    Equipment recommendations: none     GOALS:    Physical Therapy Goals        Problem: Physical Therapy Goal    Goal Priority Disciplines Outcome Goal Variances Interventions   Physical Therapy Goal     PT/OT, PT      Description:  Goals to be met by: 12 days     Patient will increase functional independence with mobility by performin. Supine to sit with Modified [place]  2. Sit to supine with Modified [place]  3. Sit to stand transfer with Supervision  4. Bed to chair transfer with Supervision using Rolling Walker  5. Gait  x 150 feet with Supervision using Rolling Walker.   6. Ascend/Descend 4 inch curb step with Stand-by Assistance using Rolling Walker.  7. Stand for 3 minutes with Stand-by Assistance using Rolling Walker while performing dynamic standing activity  8. Lower extremity exercise program x20 reps per handout, with assistance as needed  9. Pt will improve TUG time to <30s w/ RW in order to decrease fall risk                      PLAN:    Patient to be seen 5 x/week  to address the above listed problems via gait training, therapeutic activities, therapeutic exercises  Plan of Care Expires: 17    Ele Cartwright, PT 2017

## 2017-08-18 NOTE — PLAN OF CARE
Problem: Occupational Therapy Goal  Goal: Occupational Therapy Goal  OT evaluation completed.  Pt. Educated on safety with ADL skills.

## 2017-08-18 NOTE — ASSESSMENT & PLAN NOTE
Patient chronically on lorazepam and alprazolam at home  Will resume lorazepam at night as she has had difficulty sleeping  Hold alprazolam as patient without any anxious symptoms currently  Will continue to monitor.

## 2017-08-18 NOTE — SUBJECTIVE & OBJECTIVE
Past Medical History:   Diagnosis Date    Anxiety     Depression     Diabetes mellitus, type 2     Hyperlipidemia     Thyroid disease        Past Surgical History:   Procedure Laterality Date    BLADDER SUSPENSION      CATARACT EXTRACTION      HYSTERECTOMY      ORIF CONGENITAL HIP DISLOCATION         Review of patient's allergies indicates:  No Known Allergies    Current Facility-Administered Medications on File Prior to Encounter   Medication    [DISCONTINUED] acetaminophen tablet 650 mg    [DISCONTINUED] alprazolam tablet 0.25 mg    [DISCONTINUED] aspirin EC tablet 81 mg    [DISCONTINUED] atorvastatin tablet 40 mg    [DISCONTINUED] clopidogrel tablet 75 mg    [DISCONTINUED] dextrose 50% injection 12.5 g    [DISCONTINUED] dextrose 50% injection 25 g    [DISCONTINUED] glucagon (human recombinant) injection 1 mg    [DISCONTINUED] glucose chewable tablet 16 g    [DISCONTINUED] glucose chewable tablet 24 g    [DISCONTINUED] hydrocodone-acetaminophen 5-325mg per tablet 1 tablet    [DISCONTINUED] insulin aspart pen 1-10 Units    [DISCONTINUED] insulin detemir pen 30 Units    [DISCONTINUED] levothyroxine tablet 75 mcg    [DISCONTINUED] linagliptin (TRADJENTA) oral tablet    [DISCONTINUED] loperamide capsule 2 mg    [DISCONTINUED] lorazepam tablet 1 mg    [DISCONTINUED] metoprolol succinate (TOPROL-XL) 24 hr split tablet 12.5 mg    [DISCONTINUED] mirtazapine tablet 15 mg    [DISCONTINUED] morphine injection 2 mg    [DISCONTINUED] ondansetron injection 4 mg    [DISCONTINUED] pantoprazole EC tablet 40 mg    [DISCONTINUED] polyethylene glycol packet 17 g    [DISCONTINUED] sodium chloride 0.9% flush 3 mL     Current Outpatient Prescriptions on File Prior to Encounter   Medication Sig    alprazolam (XANAX) 0.25 MG tablet TAKE 1 TABLET BY MOUTH TWICE DAILY AS NEEDED FOR ANXIETY    atorvastatin (LIPITOR) 40 MG tablet Take 1 tablet (40 mg total) by mouth once daily.    CONTOUR TEST STRIPS Strp  USE TO TEST THREE TIMES DAILY    LANTUS SOLOSTAR 100 unit/mL (3 mL) InPn pen INJECT 30 UNITS EVERY MORNING    levothyroxine (SYNTHROID) 75 MCG tablet TAKE 1 TABLET BY MOUTH EVERY MORNING    lorazepam (ATIVAN) 1 MG tablet TAKE 1 TO 2 TABLETS BY MOUTH EVERY NIGHT AT BEDTIME    losartan (COZAAR) 50 MG tablet TAKE 1/2 TO 1 TABLET BY MOUTH EVERY DAY (Patient taking differently: TAKE 1 TABLET BY MOUTH EVERY DAY)    multivitamin capsule Take 1 capsule by mouth once daily.    TRADJENTA 5 mg Tab tablet TAKE 1 TABLET BY MOUTH DAILY    vitamin D 1000 units Tab Take 2,000 Units by mouth once daily.     aspirin (ECOTRIN) 81 MG EC tablet Take 1 tablet (81 mg total) by mouth once daily.    clopidogrel (PLAVIX) 75 mg tablet Take 1 tablet (75 mg total) by mouth once daily.    metoprolol succinate (TOPROL-XL) 25 MG 24 hr tablet Take 0.5 tablets (12.5 mg total) by mouth once daily.    mirtazapine (REMERON) 15 MG tablet Take 15 mg by mouth every evening.     Family History     Problem Relation (Age of Onset)    Alzheimer's disease Mother    Cancer Father    Heart disease Mother        Social History Main Topics    Smoking status: Never Smoker    Smokeless tobacco: Not on file    Alcohol use No    Drug use: Unknown    Sexual activity: Not on file     Review of Systems   Constitutional: no fever or chills  Eyes: no visual changes  ENT: no nasal congestion or sore throat  Respiratory: no cough or shortness of breath  Cardiovascular: no chest pain or palpitations  Gastrointestinal: no nausea or vomiting, no abdominal pain; last BM: 8/17  Genitourinary: no hematuria or dysuria  Integument/Breast: no rash or pruritis  Hematologic/Lymphatic: no easy bruising or lymphadenopathy  Allergy/Immunology: no postnasal drip  Musculoskeletal: no arthralgias or myalgias  Neurological: no seizures or tremors  Behavioral/Psych: no auditory or visual hallucinations  Endocrine: no heat or cold intolerance    Objective:     Vital Signs (Most  Recent):  Temp: 97.9 °F (36.6 °C) (08/18/17 0800)  Pulse: 66 (08/18/17 0800)  Resp: 18 (08/18/17 0800)  BP: (!) 104/50 (08/18/17 0800)  SpO2: (!) 94 % (08/18/17 0800) Vital Signs (24h Range):  Temp:  [97.9 °F (36.6 °C)-98.6 °F (37 °C)] 97.9 °F (36.6 °C)  Pulse:  [66-78] 66  Resp:  [16-18] 18  SpO2:  [94 %-96 %] 94 %  BP: (104-138)/(50-61) 104/50     Weight: 54.1 kg (119 lb 4.3 oz)  Body mass index is 21.81 kg/m².    Physical Exam  General: well developed, well nourished, no distress, seated in wheelchair  HENT: Head:normocephalic, atraumatic. Ears:bilateral external ear canals normal. Nose: Nares normal. Septum midline. Mucosa normal. Throat: lips, mucosa, and tongue normal and no throat erythema.  Eyes: conjunctivae/corneas clear.  EOM normal  Neck: supple, symmetrical, trachea midline  Lungs:  clear to auscultation bilaterally and normal respiratory effort  Cardiovascular: Heart: regular rate and rhythm, S1, S2 normal, no murmur, click, rub or gallop. Chest Wall: no tenderness. Extremities: no cyanosis, no edema, no clubbing. Pulses: 2+ and symmetric.  Abdomen/Rectal: Abdomen: soft, non-tender non-distended; bowel sounds normal; no masses,  no organomegaly.   Skin: Skin color, texture, turgor normal. Bruising to left arm and left shin evident  Musculoskeletal:no clubbing, cyanosis  Lymph Nodes: No cervical or supraclavicular adenopathy  Neurologic: Normal strength and tone. No focal numbness or weakness  Psych/Behavioral:  Alert and oriented, appropriate affect.    Significant Labs:     Recent Labs  Lab 08/14/17  1227 08/15/17  0305   WBC 7.19 8.81  8.81  8.81   HGB 12.8 12.3  12.3  12.3   HCT 37.9 36.1*  36.1*  36.1*    202  202  202       Recent Labs  Lab 08/14/17  1227 08/15/17  0305    136  136   K 3.7 3.6  3.6    108  108   CO2 20* 22*  22*   BUN 26 28  28   CREATININE 0.9 0.8  0.8   CALCIUM 8.1* 8.2*  8.2*   PROT 5.4* 5.2*  5.2*   BILITOT 0.4 0.5  0.5   ALKPHOS 66 87  87    ALT 68* 198*  198*   AST 96* 246*  246*

## 2017-08-18 NOTE — HPI
Chief Complaint/Reason for Admission: Debility    History of Present Illness:  Patient is a 89 y.o. female with DM2, hypothyroidism, HLP who presents to SNF after hospitalization at [facility] for STEMI with shock after developing N/V and malaise.  The patient did not wish for invasive therapy and was made DNR.  She was started on heparin and norepinephrine drip and now DAPT, high dose statin. She did receive amiodarone drip due to NSVT on telemetry.  The patient as confirmed by her daughter wishes to be DNR.  She denies any chest pain, N/V or dyspnea.  She also denies any musculoskeletal pain.  She has bee sleeping poorly and wishes to resume her lorazepam as she takes at home.  Her daughter has brought her Tradjenta which will be resumed. The patient has been admitted to SNF for ongoing PT/OT due to insufficient progress to go home safely from the hospital.

## 2017-08-19 ENCOUNTER — PATIENT MESSAGE (OUTPATIENT)
Dept: FAMILY MEDICINE | Facility: CLINIC | Age: 82
End: 2017-08-19

## 2017-08-19 LAB
POCT GLUCOSE: 118 MG/DL (ref 70–110)
POCT GLUCOSE: 194 MG/DL (ref 70–110)
POCT GLUCOSE: 212 MG/DL (ref 70–110)
POCT GLUCOSE: 370 MG/DL (ref 70–110)
POCT GLUCOSE: 378 MG/DL (ref 70–110)
POCT GLUCOSE: 387 MG/DL (ref 70–110)
POCT GLUCOSE: 78 MG/DL (ref 70–110)

## 2017-08-19 PROCEDURE — 63600175 PHARM REV CODE 636 W HCPCS: Performed by: INTERNAL MEDICINE

## 2017-08-19 PROCEDURE — 25000003 PHARM REV CODE 250: Performed by: INTERNAL MEDICINE

## 2017-08-19 PROCEDURE — 12000000 HC SNF SEMI-PRIVATE ROOM

## 2017-08-19 PROCEDURE — 25000003 PHARM REV CODE 250: Performed by: NURSE PRACTITIONER

## 2017-08-19 RX ADMIN — ACETAMINOPHEN 650 MG: 325 TABLET ORAL at 09:08

## 2017-08-19 RX ADMIN — RAMELTEON 8 MG: 8 TABLET, FILM COATED ORAL at 09:08

## 2017-08-19 RX ADMIN — ASPIRIN 81 MG: 81 TABLET, COATED ORAL at 09:08

## 2017-08-19 RX ADMIN — LEVOTHYROXINE SODIUM 75 MCG: 75 TABLET ORAL at 06:08

## 2017-08-19 RX ADMIN — PANTOPRAZOLE SODIUM 40 MG: 40 TABLET, DELAYED RELEASE ORAL at 09:08

## 2017-08-19 RX ADMIN — INSULIN ASPART 2 UNITS: 100 INJECTION, SOLUTION INTRAVENOUS; SUBCUTANEOUS at 05:08

## 2017-08-19 RX ADMIN — METOPROLOL SUCCINATE 12.5 MG: 25 TABLET, EXTENDED RELEASE ORAL at 09:08

## 2017-08-19 RX ADMIN — INSULIN ASPART 5 UNITS: 100 INJECTION, SOLUTION INTRAVENOUS; SUBCUTANEOUS at 12:08

## 2017-08-19 RX ADMIN — ATORVASTATIN CALCIUM 40 MG: 20 TABLET, FILM COATED ORAL at 09:08

## 2017-08-19 RX ADMIN — CLOPIDOGREL BISULFATE 75 MG: 75 TABLET ORAL at 09:08

## 2017-08-19 RX ADMIN — ENOXAPARIN SODIUM 30 MG: 100 INJECTION SUBCUTANEOUS at 05:08

## 2017-08-19 NOTE — CLINICAL REVIEW
Clinical Pharmacy Chart Review Note    Admit Date: 8/17/2017   LOS: 2 days     Irma Pedroza is a 89 y.o. female admitted to SNF for PT/OT after hospitalization for ST elevation myocardial infarction (STEMI).    Active Hospital Problems    Diagnosis  POA    *Debility [R53.81]  Yes    Chronic combined systolic and diastolic heart failure [I50.42]  Yes    DNR (do not resuscitate) [Z66]  Yes    ST elevation myocardial infarction (STEMI) [I21.3]  Yes    DM (diabetes mellitus)  [E11.9]  Yes    Anxiety [F41.9]  Yes      Resolved Hospital Problems    Diagnosis Date Resolved POA   No resolved problems to display.     Review of patient's allergies indicates:  No Known Allergies  Patient Active Problem List    Diagnosis Date Noted    Debility 08/18/2017    Chronic combined systolic and diastolic heart failure 08/18/2017    DNR (do not resuscitate) 08/18/2017    Cardiogenic shock 08/17/2017    ST elevation myocardial infarction (STEMI) 08/14/2017    Anxiety 11/08/2014    DM (diabetes mellitus)  11/08/2014       Scheduled Meds:    aspirin  81 mg Oral Daily    atorvastatin  40 mg Oral Daily    clopidogrel  75 mg Oral Daily    enoxaparin  30 mg Subcutaneous Daily    insulin detemir  26 Units Subcutaneous Daily    levothyroxine  75 mcg Oral Before breakfast    linagliptin  5 mg Oral Daily    metoprolol succinate  12.5 mg Oral Daily    pantoprazole  40 mg Oral Daily    polyethylene glycol  17 g Oral Daily     Continuous Infusions:    PRN Meds: acetaminophen, aluminum-magnesium hydroxide-simethicone, dextrose 50%, dextrose 50%, glucagon (human recombinant), glucose, glucose, insulin aspart, metoclopramide HCl, ondansetron, polyethylene glycol, ramelteon, senna-docusate 8.6-50 mg    OBJECTIVE:     Vital Signs (Last 24H)  Temp:  [96.5 °F (35.8 °C)-97.4 °F (36.3 °C)]   Pulse:  [70-72]   Resp:  [16-18]   BP: (124)/(59-61)   SpO2:  [95 %-98 %]     Laboratory:  CBC:   Recent Labs  Lab 08/14/17  1227  08/15/17  0305   WBC 7.19 8.81  8.81  8.81   RBC 4.12 3.99*  3.99*  3.99*   HGB 12.8 12.3  12.3  12.3   HCT 37.9 36.1*  36.1*  36.1*    202  202  202   MCV 92 91  91  91   MCH 31.1* 30.8  30.8  30.8   MCHC 33.8 34.1  34.1  34.1     BMP:   Recent Labs  Lab 08/14/17  1227 08/15/17  0305   * 51*  51*    136  136   K 3.7 3.6  3.6    108  108   CO2 20* 22*  22*   BUN 26 28  28   CREATININE 0.9 0.8  0.8   CALCIUM 8.1* 8.2*  8.2*     CMP:   Recent Labs  Lab 08/14/17  1227 08/15/17  0305   * 51*  51*   CALCIUM 8.1* 8.2*  8.2*   ALBUMIN 2.8* 2.6*  2.6*   PROT 5.4* 5.2*  5.2*    136  136   K 3.7 3.6  3.6   CO2 20* 22*  22*    108  108   BUN 26 28  28   CREATININE 0.9 0.8  0.8   ALKPHOS 66 87  87   ALT 68* 198*  198*   AST 96* 246*  246*   BILITOT 0.4 0.5  0.5     LFTs:   Recent Labs  Lab 08/14/17  1227 08/15/17  0305   ALT 68* 198*  198*   AST 96* 246*  246*   ALKPHOS 66 87  87   BILITOT 0.4 0.5  0.5   PROT 5.4* 5.2*  5.2*   ALBUMIN 2.8* 2.6*  2.6*     Coagulation:   Recent Labs  Lab 08/14/17  1227 08/14/17  1924 08/15/17  0304 08/15/17  1139   INR 1.0  --   --   --    APTT  --  35.3* 71.1* 59.7*     Cardiac markers: No results for input(s): CKMB, TROPONINT, MYOGLOBIN in the last 168 hours.  ABGs: No results for input(s): PH, PCO2, PO2, HCO3, POCSATURATED, BE in the last 168 hours.  Microbiology Results (last 7 days)     ** No results found for the last 168 hours. **        Specimen (12h ago through future)    None        No results for input(s): COLORU, CLARITYU, SPECGRAV, PHUR, PROTEINUA, GLUCOSEU, BILIRUBINCON, BLOODU, WBCU, RBCU, BACTERIA, MUCUS, NITRITE, LEUKOCYTESUR, UROBILINOGEN, HYALINECASTS in the last 168 hours.  Others: No results for input(s): [id number], TSH, T4FREE, LABLIPI in the last 168 hours.    Invalid input(s): A1C    ASSESSMENT/PLAN:      Debility   --PT/OT  --bowel regimen for constipation; hold for loose or  frequent stools  --ondansetron ODT 4 mg q6h prn n/v 1st choice, metoclopramide 5 mg q6h prn n/v 1st choice; please clarify 1st choice & 2nd choice   --DVT prophylaxis: lovenox 30 mg daily      Chronic combined systolic and diastolic heart failure   --maintain euvolemia  --toprol XL 12.5 mg daily, holding losartan 50 mg daily due to hypotension resume as tolerated   Monitor weight and volume status, EF 25% 8/14/17     ST elevation myocardial infarction (STEMI)   --medical management  --ASA EC 81 mg daily, plavix 75 mg daily  --toprol XL 12.5 mg daily  --atorvastatin 40 mg daily; ,  on 8/15- repeat hepatic panel    --pantoprazole 40 mg daily (GI prophylaxis)   Lab Results   Component Value Date    CHOL 128 08/15/2017    CHOL 179 06/23/2017    CHOL 156 12/16/2016     Lab Results   Component Value Date    HDL 55 08/15/2017    HDL 60 06/23/2017    HDL 58 12/16/2016     Lab Results   Component Value Date    LDLCALC 66.4 08/15/2017    LDLCALC 100.4 06/23/2017    LDLCALC 82.6 12/16/2016     Lab Results   Component Value Date    TRIG 33 08/15/2017    TRIG 93 06/23/2017    TRIG 77 12/16/2016     Lab Results   Component Value Date    CHOLHDL 43.0 08/15/2017    CHOLHDL 33.5 06/23/2017    CHOLHDL 37.2 12/16/2016       Hypothyroidism (acquired)  --levothyroxine 75 mcg before breakfast  Lab Results   Component Value Date    TSH 1.804 06/23/2017   Please add diagnosis to problem list if appropriate     DM (diabetes mellitus)    Lab Results   Component Value Date    HGBA1C 9.1 (H) 08/14/2017     POCT Glucose   Date Value Ref Range Status   08/19/2017 370 (H) 70 - 110 mg/dL Final   08/19/2017 378 (H) 70 - 110 mg/dL Final   08/19/2017 387 (H) 70 - 110 mg/dL Final   08/19/2017 194 (H) 70 - 110 mg/dL Final   08/18/2017 243 (H) 70 - 110 mg/dL Final   08/18/2017 314 (H) 70 - 110 mg/dL Final   08/18/2017 252 (H) 70 - 110 mg/dL Final   08/18/2017 344 (H) 70 - 110 mg/dL Final   08/18/2017 184 (H) 70 - 110 mg/dL Final    08/17/2017 268 (H) 70 - 110 mg/dL Final   08/17/2017 284 (H) 70 - 110 mg/dL Final   08/17/2017 308 (H) 70 - 110 mg/dL Final   08/17/2017 123 (H) 70 - 110 mg/dL Final   08/16/2017 211 (H) 70 - 110 mg/dL Final   --SSI 0-5 units AC&HS prn BG, levemir 26 units daily, tradjenta 5 mg daily  Monitor BG as prescribed and adjust regimen as necessary      Anxiety   --home regimen: alprazolam 0.25 mg BID prn anxiety, lorazepam 1 mg qHS (sleep)      Monitor CMP/CBC/Vitals

## 2017-08-19 NOTE — NURSING
Informed Dr. Coulter of cbg >350, patient sliding scale insulin administered per MAR, will continue to monitor.

## 2017-08-20 LAB
POCT GLUCOSE: 147 MG/DL (ref 70–110)
POCT GLUCOSE: 249 MG/DL (ref 70–110)
POCT GLUCOSE: 321 MG/DL (ref 70–110)
POCT GLUCOSE: 368 MG/DL (ref 70–110)
POCT GLUCOSE: 89 MG/DL (ref 70–110)

## 2017-08-20 PROCEDURE — 25000003 PHARM REV CODE 250: Performed by: INTERNAL MEDICINE

## 2017-08-20 PROCEDURE — 97530 THERAPEUTIC ACTIVITIES: CPT

## 2017-08-20 PROCEDURE — 25000003 PHARM REV CODE 250: Performed by: NURSE PRACTITIONER

## 2017-08-20 PROCEDURE — 97116 GAIT TRAINING THERAPY: CPT

## 2017-08-20 PROCEDURE — 97110 THERAPEUTIC EXERCISES: CPT

## 2017-08-20 PROCEDURE — 63600175 PHARM REV CODE 636 W HCPCS: Performed by: INTERNAL MEDICINE

## 2017-08-20 PROCEDURE — 97535 SELF CARE MNGMENT TRAINING: CPT

## 2017-08-20 PROCEDURE — 12000000 HC SNF SEMI-PRIVATE ROOM

## 2017-08-20 RX ADMIN — ENOXAPARIN SODIUM 30 MG: 100 INJECTION SUBCUTANEOUS at 05:08

## 2017-08-20 RX ADMIN — LEVOTHYROXINE SODIUM 75 MCG: 75 TABLET ORAL at 05:08

## 2017-08-20 RX ADMIN — METOPROLOL SUCCINATE 12.5 MG: 25 TABLET, EXTENDED RELEASE ORAL at 08:08

## 2017-08-20 RX ADMIN — POLYETHYLENE GLYCOL 3350 17 G: 17 POWDER, FOR SOLUTION ORAL at 08:08

## 2017-08-20 RX ADMIN — CLOPIDOGREL BISULFATE 75 MG: 75 TABLET ORAL at 08:08

## 2017-08-20 RX ADMIN — ATORVASTATIN CALCIUM 40 MG: 20 TABLET, FILM COATED ORAL at 08:08

## 2017-08-20 RX ADMIN — INSULIN ASPART 4 UNITS: 100 INJECTION, SOLUTION INTRAVENOUS; SUBCUTANEOUS at 12:08

## 2017-08-20 RX ADMIN — ASPIRIN 81 MG: 81 TABLET, COATED ORAL at 08:08

## 2017-08-20 RX ADMIN — INSULIN ASPART 2 UNITS: 100 INJECTION, SOLUTION INTRAVENOUS; SUBCUTANEOUS at 05:08

## 2017-08-20 RX ADMIN — ACETAMINOPHEN 650 MG: 325 TABLET ORAL at 10:08

## 2017-08-20 RX ADMIN — PANTOPRAZOLE SODIUM 40 MG: 40 TABLET, DELAYED RELEASE ORAL at 08:08

## 2017-08-20 RX ADMIN — RAMELTEON 8 MG: 8 TABLET, FILM COATED ORAL at 09:08

## 2017-08-20 NOTE — PLAN OF CARE
Problem: Occupational Therapy Goal  Goal: Occupational Therapy Goal  Goals to be met by: 12 days     Patient will increase functional independence with ADLs by performing:    Feeding with Modified [place].  UE Dressing with Modified [place].  LE Dressing with Supervision.  Grooming while standing with Supervision.  Toileting from bedside commode with Supervision for hygiene and clothing management.   Bathing from  shower chair/bench with Supervision.  Supine to sit with Modified [place].  Stand pivot transfers with Supervision.  Toilet transfer to bedside commode with Supervision.  Pt. To be independent with HEP     Pt. Tolerated session well.

## 2017-08-20 NOTE — PLAN OF CARE
Problem: Physical Therapy Goal  Goal: Physical Therapy Goal  Goals to be met by: 12 days     Patient will increase functional independence with mobility by performin. Supine to sit with Modified [place]  2. Sit to supine with Modified [place]  3. Sit to stand transfer with Supervision  4. Bed to chair transfer with Supervision using Rolling Walker  5. Gait  x 150 feet with Supervision using Rolling Walker.   6. Ascend/Descend 4 inch curb step with Stand-by Assistance using Rolling Walker.  7. Stand for 3 minutes with Stand-by Assistance using Rolling Walker while performing dynamic standing activity  8. Lower extremity exercise program x20 reps per handout, with assistance as needed  9. Pt will improve TUG time to <30s w/ RW in order to decrease fall risk      Outcome: Ongoing (interventions implemented as appropriate)  Goals remain appropriate

## 2017-08-20 NOTE — PT/OT/SLP PROGRESS
Occupational Therapy  Treatment    Irma Pedroza   MRN: [id number]   Admitting Diagnosis: Debility    OT Date of Treatment: 08/20/17  Total Time (min): 45 min    Billable Minutes:  Self Care/Home Management 26 and Therapeutic Exercise 19    General Precautions: Standard, fall, hearing impaired  Orthopedic Precautions: N/A  Braces: N/A    Do you have any cultural, spiritual, Yazidi conflicts, given your current situation?: Anglican    Subjective:  Communicated with nurse prior to session.  Pt. Reported she wanted to wash up seated EOB (and not go into shower area on this date)    Pain/Comfort  Pain Rating 1: 0/10  Pain Rating Post-Intervention 1: 0/10    Objective:  Patient found with:  (seated At EOB brushing teeth)    Functional Status:  MDS G  Transfer Functional Status: CGA- x multiple trials sit to stand from EOB and SPT from bed to w/c  Dressing Functional Status: 2:CGA-LBD to don underpants and pants/UBD Set up A   Personal Hygiene Functional Status: Set Up a seated EOB  Bathing Functional Status: CGA when standing to wash buttocks region  Moving from seated to standing position: Not steady, only able to stabilize with staff assistance  Surface-to-surface transfer (transfer between bed and chair or wheelchair): Not steady, only able to stabilize with staff assistance          OT Exercises: UE Ergometer x 10 minutes   AROM x 1 set 10 reps for BUE with 1 # dowel for all major planes of BUE motion  Pt. Issued HEP for BUE    Additional Treatment:  Pt. Educated on safety with transfers as well as ADL tasks    Patient left  up in w/c in gym to work with PT    ASSESSMENT:  Irma Pedroza is a 89 y.o. female with a medical diagnosis of Debility and presents with deficits in higher ADL tasks as well as decreased endurance and functional mobility and would benefit from continued OT services..    Rehab identified problem list/impairments: impaired endurance, impaired self care skills, impaired functional mobilty,  gait instability    Rehab potential is good    Activity tolerance: Good    Discharge recommendations: home health OT     Barriers to discharge: None     Equipment recommendations: none     GOALS:    Occupational Therapy Goals        Problem: Occupational Therapy Goal    Goal Priority Disciplines Outcome Interventions   Occupational Therapy Goal     OT, PT/OT     Description:  Goals to be met by: 12 days     Patient will increase functional independence with ADLs by performing:    Feeding with Modified [place].  UE Dressing with Modified [place].  LE Dressing with Supervision.  Grooming while standing with Supervision.  Toileting from bedside commode with Supervision for hygiene and clothing management.   Bathing from  shower chair/bench with Supervision.  Supine to sit with Modified [place].  Stand pivot transfers with Supervision.  Toilet transfer to bedside commode with Supervision.  Pt. To be independent with HEP                      Plan:  Patient to be seen 5 x/week to address the above listed problems via self-care/home management, therapeutic activities, therapeutic exercises  Plan of Care expires: 09/17/17  Plan of Care reviewed with: patient    ERIK Pineda  08/20/2017

## 2017-08-20 NOTE — PT/OT/SLP PROGRESS
Physical Therapy  Treatment    Irma Pedroza   MRN: [id number]   Admitting Diagnosis: Debility    PT Received On: 08/20/17  Total Time (min): 40       Billable Minutes:  Gait [id number], Therapeutic Activity 10 and Therapeutic Exercise 10    Treatment Type: Treatment  PT/PTA: PT             General Precautions: Standard, fall, hearing impaired  Orthopedic Precautions: N/A   Braces: N/A    Do you have any cultural, spiritual, Zoroastrian conflicts, given your current situation?: Jainism    Subjective:  Communicated with pt prior to session.  Pt agreeable to PT treatment    Pain/Comfort  Pain Rating 1: 0/10    Objective:  Patient found sitting in wheelchair with OT present       Functional Status:  MDS G  Transfer Functional Status: CGA-Min (A)  Locomotion on Unit Functional Status: CGA-Min (A)  Moving from seated to standing position: Not steady, only able to stabilize with staff assistance  Walking (with assistive device if used): Not steady, only able to stabilize with staff assistance  Turning around and facing the opposite direction while walking: Not steady, only able to stabilize with staff assistance  Surface-to-surface transfer (transfer between bed and chair or wheelchair): Not steady, only able to stabilize with staff assistance          Transfers:  Sit<>Stand: CGA from wheelchair using RW  Skilled verbal instruction for proper hand placement    Gait:  Amb 75 feet using RW with CGA x2 trial with seated rest break due to fatigue and SOB with skilled verbal instruction to achieve and maintain full upright posture.   Amb 20 feet x3 trials using RW with turns to perform TUG with CGA      Advanced Gait:  Curb Step: ascending and descending x2 trials using RW with CGA and skilled verbal and tactile instruction for proper technique and safety    Therex:  Seated therex including:  Ankle pumps 20  LAQ 20  Hip abduction and adduction 20  Hip flexion 20    Balance:  Pt demonstrates fair standing balance with ability to  accept min challenges without LOB    Additional Treatment:  TUG 36.15 sec (average of 3 trials)    Patient left sitting in wheelchair at bedside with call button in reach.    Assessment:  Irma Pedroza is a 89 y.o. female with a medical diagnosis of Debility.  Pt demonstrates generalized weakness, impaired balance, gait and endurance, with SOB with ambulation requiring CGA for transfers, ambulation and curb negotiation with Rw. Pt will benefit from continued PT treatment to address remaining impairments and improve functional mobility and independence.     Rehab identified problem list/impairments: weakness, impaired endurance, impaired sensation, impaired functional mobilty, gait instability, impaired balance, decreased lower extremity function, pain    Rehab potential is good.    Activity tolerance: Good    Discharge recommendations: home with home health     Barriers to discharge: None    Equipment recommendations: none     GOALS:    Physical Therapy Goals        Problem: Physical Therapy Goal    Goal Priority Disciplines Outcome Goal Variances Interventions   Physical Therapy Goal     PT/OT, PT Ongoing (interventions implemented as appropriate)     Description:  Goals to be met by: 12 days     Patient will increase functional independence with mobility by performin. Supine to sit with Modified [place]  2. Sit to supine with Modified [place]  3. Sit to stand transfer with Supervision  4. Bed to chair transfer with Supervision using Rolling Walker  5. Gait  x 150 feet with Supervision using Rolling Walker.   6. Ascend/Descend 4 inch curb step with Stand-by Assistance using Rolling Walker.  7. Stand for 3 minutes with Stand-by Assistance using Rolling Walker while performing dynamic standing activity  8. Lower extremity exercise program x20 reps per handout, with assistance as needed  9. Pt will improve TUG time to <30s w/ RW in order to decrease fall risk                       PLAN:    Patient  to be seen 5 x/week  to address the above listed problems via gait training, therapeutic activities, therapeutic exercises  Plan of Care expires: 09/17/17  Plan of Care reviewed with: patient    Humaira STEPHANY Robert, PT  08/20/2017

## 2017-08-20 NOTE — PLAN OF CARE
Problem: Fall Risk (Adult)  Goal: Absence of Falls  Patient will demonstrate the desired outcomes by discharge/transition of care.   Outcome: Ongoing (interventions implemented as appropriate)  Hourly rounding implemented by nurse and PCT, instructed patient to use call light for any needs and not to get out of bed without assistance. Patient demonstrates understanding appropriate use of call light. Personal belongings placed at bedside within reach. Patient remains absent of falls throughout shift. Will continue to monitor.

## 2017-08-20 NOTE — PLAN OF CARE
Problem: Patient Care Overview  Goal: Plan of Care Review  Outcome: Ongoing (interventions implemented as appropriate)  Pt a a o x 4, vss, resp effort even and unlabored, voids per BSC, call bell within reach. Side rails up x 3. Pt has remained free from falls. Will continue to monitor

## 2017-08-21 LAB
ANION GAP SERPL CALC-SCNC: 8 MMOL/L
BASOPHILS # BLD AUTO: 0.01 K/UL
BASOPHILS NFR BLD: 0.2 %
BUN SERPL-MCNC: 23 MG/DL
CALCIUM SERPL-MCNC: 8.7 MG/DL
CHLORIDE SERPL-SCNC: 107 MMOL/L
CO2 SERPL-SCNC: 26 MMOL/L
CREAT SERPL-MCNC: 0.9 MG/DL
DIFFERENTIAL METHOD: ABNORMAL
EOSINOPHIL # BLD AUTO: 0 K/UL
EOSINOPHIL NFR BLD: 0.6 %
ERYTHROCYTE [DISTWIDTH] IN BLOOD BY AUTOMATED COUNT: 13.4 %
EST. GFR  (AFRICAN AMERICAN): >60 ML/MIN/1.73 M^2
EST. GFR  (NON AFRICAN AMERICAN): 56.1 ML/MIN/1.73 M^2
GLUCOSE SERPL-MCNC: 34 MG/DL
HCT VFR BLD AUTO: 39.1 %
HGB BLD-MCNC: 12.7 G/DL
LYMPHOCYTES # BLD AUTO: 0.8 K/UL
LYMPHOCYTES NFR BLD: 11.7 %
MAGNESIUM SERPL-MCNC: 1.9 MG/DL
MCH RBC QN AUTO: 30.7 PG
MCHC RBC AUTO-ENTMCNC: 32.5 G/DL
MCV RBC AUTO: 94 FL
MONOCYTES # BLD AUTO: 0.7 K/UL
MONOCYTES NFR BLD: 10.8 %
NEUTROPHILS # BLD AUTO: 5 K/UL
NEUTROPHILS NFR BLD: 76.7 %
PHOSPHATE SERPL-MCNC: 4.5 MG/DL
PLATELET # BLD AUTO: 207 K/UL
PMV BLD AUTO: 11.8 FL
POCT GLUCOSE: 330 MG/DL (ref 70–110)
POCT GLUCOSE: 397 MG/DL (ref 70–110)
POCT GLUCOSE: 67 MG/DL (ref 70–110)
POCT GLUCOSE: 78 MG/DL (ref 70–110)
POCT GLUCOSE: 79 MG/DL (ref 70–110)
POTASSIUM SERPL-SCNC: 3.8 MMOL/L
RBC # BLD AUTO: 4.14 M/UL
SODIUM SERPL-SCNC: 141 MMOL/L
WBC # BLD AUTO: 6.5 K/UL

## 2017-08-21 PROCEDURE — 97535 SELF CARE MNGMENT TRAINING: CPT

## 2017-08-21 PROCEDURE — 97116 GAIT TRAINING THERAPY: CPT

## 2017-08-21 PROCEDURE — 12000000 HC SNF SEMI-PRIVATE ROOM

## 2017-08-21 PROCEDURE — 97110 THERAPEUTIC EXERCISES: CPT

## 2017-08-21 PROCEDURE — 36415 COLL VENOUS BLD VENIPUNCTURE: CPT

## 2017-08-21 PROCEDURE — 97530 THERAPEUTIC ACTIVITIES: CPT

## 2017-08-21 PROCEDURE — 84100 ASSAY OF PHOSPHORUS: CPT

## 2017-08-21 PROCEDURE — 63600175 PHARM REV CODE 636 W HCPCS: Performed by: INTERNAL MEDICINE

## 2017-08-21 PROCEDURE — 83735 ASSAY OF MAGNESIUM: CPT

## 2017-08-21 PROCEDURE — 85025 COMPLETE CBC W/AUTO DIFF WBC: CPT

## 2017-08-21 PROCEDURE — 99309 SBSQ NF CARE MODERATE MDM 30: CPT | Mod: ,,, | Performed by: NURSE PRACTITIONER

## 2017-08-21 PROCEDURE — 25000003 PHARM REV CODE 250: Performed by: INTERNAL MEDICINE

## 2017-08-21 PROCEDURE — 80048 BASIC METABOLIC PNL TOTAL CA: CPT

## 2017-08-21 PROCEDURE — 25000003 PHARM REV CODE 250: Performed by: NURSE PRACTITIONER

## 2017-08-21 RX ADMIN — ATORVASTATIN CALCIUM 40 MG: 20 TABLET, FILM COATED ORAL at 09:08

## 2017-08-21 RX ADMIN — INSULIN ASPART 5 UNITS: 100 INJECTION, SOLUTION INTRAVENOUS; SUBCUTANEOUS at 12:08

## 2017-08-21 RX ADMIN — ACETAMINOPHEN 650 MG: 325 TABLET ORAL at 01:08

## 2017-08-21 RX ADMIN — PANTOPRAZOLE SODIUM 40 MG: 40 TABLET, DELAYED RELEASE ORAL at 09:08

## 2017-08-21 RX ADMIN — CLOPIDOGREL BISULFATE 75 MG: 75 TABLET ORAL at 09:08

## 2017-08-21 RX ADMIN — LEVOTHYROXINE SODIUM 75 MCG: 75 TABLET ORAL at 05:08

## 2017-08-21 RX ADMIN — RAMELTEON 8 MG: 8 TABLET, FILM COATED ORAL at 09:08

## 2017-08-21 RX ADMIN — ENOXAPARIN SODIUM 30 MG: 100 INJECTION SUBCUTANEOUS at 04:08

## 2017-08-21 RX ADMIN — POLYETHYLENE GLYCOL 3350 17 G: 17 POWDER, FOR SOLUTION ORAL at 09:08

## 2017-08-21 RX ADMIN — ASPIRIN 81 MG: 81 TABLET, COATED ORAL at 07:08

## 2017-08-21 NOTE — PLAN OF CARE
Problem: Occupational Therapy Goal  Goal: Occupational Therapy Goal  Goals to be met by: 12 days     Patient will increase functional independence with ADLs by performing:    Feeding with Modified [place].  UE Dressing with Modified [place].  LE Dressing with Supervision.  Grooming while standing with Supervision.  Toileting from bedside commode with Supervision for hygiene and clothing management.   Bathing from  shower chair/bench with Supervision.  Supine to sit with Modified [place].  Stand pivot transfers with Supervision.  Toilet transfer to bedside commode with Supervision.  Pt. To be independent with HEP     Outcome: Ongoing (interventions implemented as appropriate)  Patient's goals are appropriate.  ERIK Cornejo  8/21/2017

## 2017-08-21 NOTE — PROGRESS NOTES
.Pharmacy New Medication Education    Patient accepted medication education.    Pharmacy educated patient on name and purpose of medications and possible side effects, using the teach-back method.     Apap  Xanax  Asa  Lipitor  Plavix  D50%  Glucagon  Glucose  Norco  Novolog  Detemir  Synthroid  Tradjenta  Loperarmide  Remeron  Morphine  Zofran  Pantoprazole  miralax      Learners of pharmacy medication education included:  patient    Patient +/- learner response:  teachback

## 2017-08-21 NOTE — PT/OT/SLP PROGRESS
"Physical Therapy  Treatment    Irma Pedroza   MRN: [id number]   Admitting Diagnosis: Debility    PT Received On: 08/21/17  Total Time (min): 55       Billable Minutes:  Gait [id number], Therapeutic Activity 10 and Therapeutic Exercise 30    Treatment Type: Treatment  PT/PTA: PTA     PTA Visit Number: 1       General Precautions: Standard, fall, hearing impaired  Orthopedic Precautions: N/A   Braces: N/A    Do you have any cultural, spiritual, Adventism conflicts, given your current situation?: Adventism    Subjective:  "I'm doing fine."    Pain/Comfort  Pain Rating 1: 0/10  Pain Rating Post-Intervention 1: 0/10    Objective:  Patient found supine in bed     Functional Status:  MDS G  Bed Mobility Functional Status: S-SBA  Transfer Functional Status: CGA-Min (A)  Walk in Room Functional Status: CGA-Min (A)  Walk in Corridor Functional Status: CGA-Min (A)  Locomotion on Unit Functional Status: CGA-Min (A)          Bed Mobility:  Supine > sit from bed with SBA    Transfers:  Sit <> stand from EOB, w/c with CGA  SPT from bed > w/c with CGA.  VCs for sequencing    Gait:  Pt gait trained x 150 feet x 2 trials; 200 feet with RW and CGA for balance and safety. VCs to widen DENEEN to increase balance and safety.  Assistive Device: rolling walker  Gait Deviation(s): decreased kamille;decreased toe-to-floor clearance;decreased weight-shifting ability;decreased velocity of limb motion;decreased step length;decreased stride length;decreased swing-to-stance ratio     Advanced Gait:  Pt asc/desc 4" curb with RW and CGA.  VCs for technique    Wheelchair Mobility:  Patient propels w/c 150 feet with BUEs and (S).  VCs for steering straight    Therex:  BLEs seated therex x 30 reps includings: APs, GS, ADD/ABD, LAQs, hip flexion       Balance:  Dynamic sitting activity x 5 minutes    Additional Treatment:  LBE x 15 minutes to increase strength, endurance and coordination    Patient left up in chair with call button in " reach.    Assessment:  Irma Pedroza is a 89 y.o. female with a medical diagnosis of Debility.  Pt tolerated tx well today. She was able to ambulate a longer distance with CGA.  Continue to progress as tolerated.      Rehab identified problem list/impairments: weakness, impaired endurance, impaired sensation, impaired functional mobilty, gait instability, impaired balance, decreased lower extremity function, pain    Rehab potential is fair.    Activity tolerance: Fair    Discharge recommendations: home with home health     Barriers to discharge: None    Equipment recommendations: none     GOALS:    Physical Therapy Goals        Problem: Physical Therapy Goal    Goal Priority Disciplines Outcome Goal Variances Interventions   Physical Therapy Goal     PT/OT, PT Ongoing (interventions implemented as appropriate)     Description:  Goals to be met by: 12 days     Patient will increase functional independence with mobility by performin. Supine to sit with Modified [place]  2. Sit to supine with Modified [place]  3. Sit to stand transfer with Supervision  4. Bed to chair transfer with Supervision using Rolling Walker  5. Gait  x 150 feet with Supervision using Rolling Walker.   6. Ascend/Descend 4 inch curb step with Stand-by Assistance using Rolling Walker.  7. Stand for 3 minutes with Stand-by Assistance using Rolling Walker while performing dynamic standing activity  8. Lower extremity exercise program x20 reps per handout, with assistance as needed  9. Pt will improve TUG time to <30s w/ RW in order to decrease fall risk                        PLAN:    Patient to be seen 5 x/week  to address the above listed problems via gait training, therapeutic activities, therapeutic exercises  Plan of Care expires: 17  Plan of Care reviewed with: patient    Myriam Alvarez, PTA  2017

## 2017-08-21 NOTE — PLAN OF CARE
Problem: Physical Therapy Goal  Goal: Physical Therapy Goal  Goals to be met by: 12 days     Patient will increase functional independence with mobility by performin. Supine to sit with Modified [place]  2. Sit to supine with Modified [place]  3. Sit to stand transfer with Supervision  4. Bed to chair transfer with Supervision using Rolling Walker  5. Gait  x 150 feet with Supervision using Rolling Walker.   6. Ascend/Descend 4 inch curb step with Stand-by Assistance using Rolling Walker.  7. Stand for 3 minutes with Stand-by Assistance using Rolling Walker while performing dynamic standing activity  8. Lower extremity exercise program x20 reps per handout, with assistance as needed  9. Pt will improve TUG time to <30s w/ RW in order to decrease fall risk      Outcome: Ongoing (interventions implemented as appropriate)  Goals remain appropriate.

## 2017-08-21 NOTE — PLAN OF CARE
Problem: Patient Care Overview  Goal: Plan of Care Review  Outcome: Ongoing (interventions implemented as appropriate)  Pt is a a o x 4, vss, resp effort even and unlabored, voids per BSC. Call bell within reach, side rails up x 3, Pt has remained free from falls. Will continue to monitor   08/21/17 0860   Coping/Psychosocial   Plan Of Care Reviewed With patient

## 2017-08-21 NOTE — PT/OT/SLP PROGRESS
Occupational Therapy  Treatment    Irma Pedroza   MRN: [id number]   Admitting Diagnosis: Debility    OT Date of Treatment: 08/21/17  Total Time (min): 55 min    Billable Minutes:  Self Care/Home Management 10, Therapeutic Activity 20 and Therapeutic Exercise 25    General Precautions: Standard, fall, hearing impaired  Orthopedic Precautions: N/A  Braces: N/A    Do you have any cultural, spiritual, Uatsdin conflicts, given your current situation?: Episcopal    Subjective:  Communicated with patient prior to session.    Pain/Comfort  Pain Rating 1: 0/10  Pain Rating Post-Intervention 1: 0/10    Objective:       Functional Status:  MDS G  Bed Mobility Functional Status: S-SBA sit>supine with supervision  Transfer Functional Status: CGA-Min (A) BSC>w/c stand pivot with CGA; w/c>bed stand pivot  Toilet Use Functional Status: CGA-Min (A) CGA      OT Exercises: UE Ergometer 15 min for improving endurance to increase independence with ADLs.   Rickshaw 25 x 4 10# for improving strength to increase independence with functional mobility    Additional Treatment:  Patient performed visual perception activities (spatial relations board and matching cards on board) while standing with supervision for balance and activity tolerance with CGA     Patient left HOB elevated with call button in reach and all needs met.     ASSESSMENT:  Irma Pedroza is a 89 y.o. female with a medical diagnosis of Debility and presents with the deficits listed below. Patient tolerated treatment session and was motivated to complete tasks. Patient needed multiple seated rest breaks 2* fatigue. Patient continues to benefit from skilled OT services to achieve maximal independence.    Rehab identified problem list/impairments: weakness, impaired endurance, impaired self care skills, impaired functional mobilty, gait instability    Rehab potential is good    Activity tolerance: Good    Discharge recommendations: home health OT     Barriers to discharge:  None     Equipment recommendations: none     GOALS:    Occupational Therapy Goals        Problem: Occupational Therapy Goal    Goal Priority Disciplines Outcome Interventions   Occupational Therapy Goal     OT, PT/OT Ongoing (interventions implemented as appropriate)    Description:  Goals to be met by: 12 days     Patient will increase functional independence with ADLs by performing:    Feeding with Modified [place].  UE Dressing with Modified [place].  LE Dressing with Supervision.  Grooming while standing with Supervision.  Toileting from bedside commode with Supervision for hygiene and clothing management.   Bathing from  shower chair/bench with Supervision.  Supine to sit with Modified [place].  Stand pivot transfers with Supervision.  Toilet transfer to bedside commode with Supervision.  Pt. To be independent with HEP                      Plan:  Patient to be seen 5 x/week to address the above listed problems via self-care/home management, therapeutic activities, therapeutic exercises  Plan of Care expires: 09/17/17  Plan of Care reviewed with: patient    ERIK Cornejo  08/21/2017

## 2017-08-21 NOTE — PLAN OF CARE
Problem: Patient Care Overview  Goal: Plan of Care Review  Outcome: Revised  Repositions independently, no new skin breakdowns noted. criticaide to small sacral dried scab. Afebrile. Monitored for pain and safety q 1-2 hrsl this shift,. Safety maintained. Pain med effective.

## 2017-08-21 NOTE — PROGRESS NOTES
Critical lab value called in from [facility] lab am blood drawn at 0415 resulted glucose 34. 0730 am accucheck 67, pt asymptomatic offered juice, breakfast served at this time. 0915 accucheck recheck 330. [email or web address] Masterson  Notified of above. Ok to hold coverage for 330. Will recheck for lunch

## 2017-08-22 LAB
ALBUMIN SERPL BCP-MCNC: 2.6 G/DL
ALP SERPL-CCNC: 76 U/L
ALT SERPL W/O P-5'-P-CCNC: 37 U/L
AST SERPL-CCNC: 30 U/L
BILIRUB DIRECT SERPL-MCNC: 0.2 MG/DL
BILIRUB SERPL-MCNC: 0.4 MG/DL
POCT GLUCOSE: 210 MG/DL (ref 70–110)
POCT GLUCOSE: 276 MG/DL (ref 70–110)
POCT GLUCOSE: 319 MG/DL (ref 70–110)
POCT GLUCOSE: 79 MG/DL (ref 70–110)
POCT GLUCOSE: 91 MG/DL (ref 70–110)
PROT SERPL-MCNC: 5.9 G/DL

## 2017-08-22 PROCEDURE — 97110 THERAPEUTIC EXERCISES: CPT

## 2017-08-22 PROCEDURE — 25000003 PHARM REV CODE 250: Performed by: INTERNAL MEDICINE

## 2017-08-22 PROCEDURE — 80076 HEPATIC FUNCTION PANEL: CPT

## 2017-08-22 PROCEDURE — 12000000 HC SNF SEMI-PRIVATE ROOM

## 2017-08-22 PROCEDURE — 97530 THERAPEUTIC ACTIVITIES: CPT

## 2017-08-22 PROCEDURE — 97116 GAIT TRAINING THERAPY: CPT

## 2017-08-22 PROCEDURE — 36415 COLL VENOUS BLD VENIPUNCTURE: CPT

## 2017-08-22 PROCEDURE — 63600175 PHARM REV CODE 636 W HCPCS: Performed by: INTERNAL MEDICINE

## 2017-08-22 PROCEDURE — 25000003 PHARM REV CODE 250: Performed by: NURSE PRACTITIONER

## 2017-08-22 PROCEDURE — 97535 SELF CARE MNGMENT TRAINING: CPT

## 2017-08-22 RX ADMIN — ENOXAPARIN SODIUM 30 MG: 100 INJECTION SUBCUTANEOUS at 04:08

## 2017-08-22 RX ADMIN — INSULIN ASPART 4 UNITS: 100 INJECTION, SOLUTION INTRAVENOUS; SUBCUTANEOUS at 12:08

## 2017-08-22 RX ADMIN — ATORVASTATIN CALCIUM 40 MG: 20 TABLET, FILM COATED ORAL at 08:08

## 2017-08-22 RX ADMIN — METOPROLOL SUCCINATE 12.5 MG: 25 TABLET, EXTENDED RELEASE ORAL at 08:08

## 2017-08-22 RX ADMIN — INSULIN ASPART 2 UNITS: 100 INJECTION, SOLUTION INTRAVENOUS; SUBCUTANEOUS at 05:08

## 2017-08-22 RX ADMIN — PANTOPRAZOLE SODIUM 40 MG: 40 TABLET, DELAYED RELEASE ORAL at 08:08

## 2017-08-22 RX ADMIN — LEVOTHYROXINE SODIUM 75 MCG: 75 TABLET ORAL at 05:08

## 2017-08-22 RX ADMIN — INSULIN ASPART 1 UNITS: 100 INJECTION, SOLUTION INTRAVENOUS; SUBCUTANEOUS at 08:08

## 2017-08-22 RX ADMIN — CLOPIDOGREL BISULFATE 75 MG: 75 TABLET ORAL at 08:08

## 2017-08-22 RX ADMIN — RAMELTEON 8 MG: 8 TABLET, FILM COATED ORAL at 08:08

## 2017-08-22 RX ADMIN — ASPIRIN 81 MG: 81 TABLET, COATED ORAL at 08:08

## 2017-08-22 NOTE — ASSESSMENT & PLAN NOTE
-Currently compensated  -Continue medical therapy with metoprolol and resume losartan as BP tolerates, SBP in low 100s continue to hold lasartan  -Monitor with daily weights

## 2017-08-22 NOTE — ASSESSMENT & PLAN NOTE
-Poorly controlled  -Continue tradjenta with levemir and SSNI prn hyperglycemia, decreased levemir today from 30 to 27 daily  -check 2am blood sugar  -Continue diabetic diet  -will continue to monitor and adjust regimen as necessary

## 2017-08-22 NOTE — PT/OT/SLP PROGRESS
"Occupational Therapy  Treatment    Irma Pedroza   MRN: [id number]   Admitting Diagnosis: Debility    OT Date of Treatment: 08/22/17  Total Time (min): 48 min    Billable Minutes:  Self Care/Home Management 18 Therapeutic Activity 15 and Therapeutic Exercise 15    General Precautions: Standard, fall, hearing impaired  Orthopedic Precautions: N/A  Braces: N/A    Do you have any cultural, spiritual, Orthodoxy conflicts, given your current situation?: Hindu    Subjective:  Communicated with patient prior to session.  " I can't hear you,yes I would like a sponge bath not a shower."    Pain/Comfort  Pain Rating 1: 1/10  Location - Side 1: Right  Location - Orientation 1: generalized  Location 1: back    Objective:       Functional Status:  MDS G  Bed Mobility Functional Status: S-SBA  Supine to sit to EOB with use of side rails  Transfer Functional Status: CGA-Min (a) Sit to stand for stand pivot transfer bed to w/c   Dressing Functional Status: 2:CGA-Min (a)  Pt needed SBA /c UBD (doffing and donning pullover shirt) and CGA /c LBD garments ([place] depends, donning underwear and pants)   Bathing Functional Status: CGA-Min (a)   spongebath with CGA  (patient had a loss of balance during task needing CGA to correct)    OT Exercises: UE Ergometer 15 min /c breaks for improving strength and endurence to increase independence with ADLs    Additional Treatment:  Pt completed large peg board activity /c breaks for standing tolerance and balance    Patient left up in chair with call button in reach    ASSESSMENT:  Irma Pedroza is a 89 y.o. female with a medical diagnosis of Debility and presents with deficits listed below. Pt tolerated treatment session with breaks and was motivated to complete ADLs and other task. Patient still presents with impaired balance during standing task. Pt continues to benefit from OT to increase in self-care and functional mobility to increase independence level.    Rehab identified problem " list/impairments: weakness, impaired endurance, impaired self care skills, impaired functional mobilty, gait instability, decreased lower extremity function, decreased safety awareness    Rehab potential is fair    Activity tolerance: Fair    Discharge recommendations: home health OT     Barriers to discharge: None     Equipment recommendations: none     GOALS:    Occupational Therapy Goals        Problem: Occupational Therapy Goal    Goal Priority Disciplines Outcome Interventions   Occupational Therapy Goal     OT, PT/OT Ongoing (interventions implemented as appropriate)    Description:  Goals to be met by: 12 days     Patient will increase functional independence with ADLs by performing:    Feeding with Modified [place].  UE Dressing with Modified [place].  LE Dressing with Supervision.  Grooming while standing with Supervision.  Toileting from bedside commode with Supervision for hygiene and clothing management.   Bathing from  shower chair/bench with Supervision.  Supine to sit with Modified [place].  Stand pivot transfers with Supervision.  Toilet transfer to bedside commode with Supervision.  Pt. To be independent with HEP                      Plan:  Patient to be seen 5 x/week to address the above listed problems via self-care/home management, therapeutic activities, therapeutic exercises  Plan of Care expires: 09/17/17  Plan of Care reviewed with: patient    Belem DiasJOVANNI coronado  08/22/2017   I certify that I was present in the room directing the student in service delivery and guiding them using my skilled judgment. As the co-signing therapist I have reviewed the students documentation and am responsible for the treatment, assessment, and plan.     ERIK Cornejo  8/22/2017

## 2017-08-22 NOTE — PLAN OF CARE
Problem: Occupational Therapy Goal  Goal: Occupational Therapy Goal  Goals to be met by: 12 days     Patient will increase functional independence with ADLs by performing:    Feeding with Modified [place].  UE Dressing with Modified [place].  LE Dressing with Supervision.  Grooming while standing with Supervision.  Toileting from bedside commode with Supervision for hygiene and clothing management.   Bathing from  shower chair/bench with Supervision.  Supine to sit with Modified [place].  Stand pivot transfers with Supervision.  Toilet transfer to bedside commode with Supervision.  Pt. To be independent with HEP     Outcome: Ongoing (interventions implemented as appropriate)  Patient's goals are appropriate.  ERIK Cornejo  8/22/2017

## 2017-08-22 NOTE — PROGRESS NOTES
[facility] Department of Hospital Medicine  Progress Note    Patient Name: Irma Pedroza  MRN: [id number]  Code Status: DNR  Admission Date: 8/17/2017  Length of Stay: 5 days  Attending Physician: Milton Julian MD  Primary Care Provider: Leonidas Potts MD    Subjective:     Principal Problem:Debility    Chief Complaint/Reason for Admission: Debility    History of Present Illness:  Patient is a 89 y.o. female with DM2, hypothyroidism, HLP who presents to SNF after hospitalization at [facility] for STEMI with shock after developing N/V and malaise.  The patient did not wish for invasive therapy and was made DNR.  She was started on heparin and norepinephrine drip and now DAPT, high dose statin. She did receive amiodarone drip due to NSVT on telemetry.  The patient as confirmed by her daughter wishes to be DNR.  She denies any chest pain, N/V or dyspnea.  She also denies any musculoskeletal pain.  She has bee sleeping poorly and wishes to resume her lorazepam as she takes at home.  Her daughter has brought her Tradjenta which will be resumed. The patient has been admitted to SNF for ongoing PT/OT due to insufficient progress to go home safely from the hospital.    Hospital Course:  The patient was admitted at [facility] from 8/14 to 8/17/2017.  8/17/17: Patient admitted to SNF for ongoing PT/OT following a hospitalization for STEMI.    Interval History: Patient seen at bedside, denies chest pain, no acute events overnight.    Review of Systems   Constitutional: Negative for appetite change, chills, fatigue and fever.   HENT: Negative for trouble swallowing.    Respiratory: Negative for cough, chest tightness, shortness of breath and wheezing.    Cardiovascular: Negative for chest pain, palpitations and leg swelling.   Gastrointestinal: Negative for abdominal pain, constipation, diarrhea and nausea.   Genitourinary: Negative for difficulty urinating, frequency and urgency.   Musculoskeletal:  Negative for arthralgias and myalgias.   Skin: Negative for rash.   Neurological: Negative for dizziness, weakness, light-headedness and headaches.   Psychiatric/Behavioral: Negative for sleep disturbance.     Scheduled Meds:   aspirin  81 mg Oral Daily    atorvastatin  40 mg Oral Daily    clopidogrel  75 mg Oral Daily    enoxaparin  30 mg Subcutaneous Daily    [START ON 8/22/2017] insulin detemir  27 Units Subcutaneous Daily    levothyroxine  75 mcg Oral Before breakfast    linagliptin  5 mg Oral Daily    metoprolol succinate  12.5 mg Oral Daily    pantoprazole  40 mg Oral Daily    polyethylene glycol  17 g Oral Daily     Continuous Infusions:   PRN Meds:.acetaminophen, aluminum-magnesium hydroxide-simethicone, dextrose 50%, dextrose 50%, glucagon (human recombinant), glucose, glucose, insulin aspart, metoclopramide HCl, ondansetron, polyethylene glycol, ramelteon, senna-docusate 8.6-50 mg    Objective:     Vital Signs (Most Recent):  Temp: 98.7 °F (37.1 °C) (08/21/17 1915)  Pulse: 75 (08/21/17 1915)  Resp: 18 (08/21/17 1915)  BP: 115/75 (08/21/17 1915)  SpO2: 97 % (08/21/17 1915) Vital Signs (24h Range):  Temp:  [97.8 °F (36.6 °C)-98.7 °F (37.1 °C)] 98.7 °F (37.1 °C)  Pulse:  [73-75] 75  Resp:  [18] 18  SpO2:  [97 %-99 %] 97 %  BP: (108-115)/(55-75) 115/75     Weight: 54 kg (119 lb 0.8 oz)  Body mass index is 21.77 kg/m².    Intake/Output Summary (Last 24 hours) at 08/21/17 2137  Last data filed at 08/21/17 1230   Gross per 24 hour   Intake              790 ml   Output                3 ml   Net              787 ml      Physical Exam   Constitutional: She is oriented to person, place, and time. She appears well-developed and well-nourished. No distress.   Cardiovascular: Normal rate, regular rhythm and normal heart sounds.  Exam reveals no gallop and no friction rub.    No murmur heard.  Pulmonary/Chest: Effort normal and breath sounds normal. No respiratory distress. She has no wheezes. She has no rales.    Abdominal: Soft. Bowel sounds are normal. She exhibits no distension. There is no tenderness.   Musculoskeletal: Normal range of motion. She exhibits no edema or tenderness.   Neurological: She is alert and oriented to person, place, and time.   Skin: Skin is warm and dry. No rash noted. She is not diaphoretic. No cyanosis. Nails show no clubbing.   Psychiatric: She has a normal mood and affect. Her behavior is normal.     Significant Labs:     Recent Labs  Lab 08/15/17  0305 08/21/17  0433   WBC 8.81  8.81  8.81 6.50   HGB 12.3  12.3  12.3 12.7   HCT 36.1*  36.1*  36.1* 39.1     202  202 207         Recent Labs  Lab 08/15/17  0305 08/21/17  0433     136 141   K 3.6  3.6 3.8     108 107   CO2 22*  22* 26   BUN 28  28 23   CREATININE 0.8  0.8 0.9   CALCIUM 8.2*  8.2* 8.7   PROT 5.2*  5.2*  --    BILITOT 0.5  0.5  --    ALKPHOS 87  87  --    *  198*  --    *  246*  --      Lab Results   Component Value Date    LABPROT 10.9 08/14/2017    ALBUMIN 2.6 (L) 08/15/2017    ALBUMIN 2.6 (L) 08/15/2017     Lab Results   Component Value Date    CALCIUM 8.7 08/21/2017    PHOS 4.5 08/21/2017     POCT Glucose   Date Value Ref Range Status   08/21/2017 78 70 - 110 mg/dL Final   08/21/2017 79 70 - 110 mg/dL Final   08/21/2017 397 (H) 70 - 110 mg/dL Final   08/21/2017 330 (H) 70 - 110 mg/dL Final   08/21/2017 67 (L) 70 - 110 mg/dL Final   08/20/2017 147 (H) 70 - 110 mg/dL Final   08/20/2017 249 (H) 70 - 110 mg/dL Final   08/20/2017 321 (H) 70 - 110 mg/dL Final   08/20/2017 368 (H) 70 - 110 mg/dL Final   08/20/2017 89 70 - 110 mg/dL Final   08/19/2017 118 (H) 70 - 110 mg/dL Final   08/19/2017 78 70 - 110 mg/dL Final   08/19/2017 212 (H) 70 - 110 mg/dL Final   08/19/2017 370 (H) 70 - 110 mg/dL Final   08/19/2017 378 (H) 70 - 110 mg/dL Final   08/19/2017 387 (H) 70 - 110 mg/dL Final   08/19/2017 194 (H) 70 - 110 mg/dL Final   Results for RAMÍREZ RAMIREZ (MRN [id number]) as of  8/21/2017 21:41   Ref. Range 8/21/2017 04:33   Magnesium Latest Ref Range: 1.6 - 2.6 mg/dL 1.9     Significant Imaging: n/a    Assessment/Plan:      * Debility    -continue PT/OT to increase ambulation, ADL performance and endurance  -continue enoxaparin for DVT prophylaxis  -continue fall precautions  -continue miralax as she takes at home to prevent constipation; hold for frequent or loose stooling        DNR (do not resuscitate)    -Discussed with patient and daughter  -Paperwork completed.         Chronic combined systolic and diastolic heart failure    -Currently compensated  -Continue medical therapy with metoprolol and resume losartan as BP tolerates, SBP in low 100s continue to hold lasartan  -Monitor with daily weights        ST elevation myocardial infarction (STEMI)    -No further N/V  -Continue medical therapy with ASA 81mg daily, clopidogrel 75mg daily, atorvastatin 40mg daily, metoprolol 12.5mg daily  -Continue GI prophylaxis with pantoprazole        DM (diabetes mellitus)     -Poorly controlled  -Continue tradjenta with levemir and SSNI prn hyperglycemia, decreased levemir today from 30 to 27 daily  -check 2am blood sugar  -Continue diabetic diet  -will continue to monitor and adjust regimen as necessary        Anxiety    -Patient chronically on lorazepam and alprazolam at home  -Hold alprazolam as patient without any anxious symptoms currently  -Will continue to monitor.          Future Appointments  Date Time Provider Department Center   9/5/2017 8:40 AM Nicho Sandra MD [facility] CARDIO Candace Masterson NP  Department of Hospital Medicine  [facility]

## 2017-08-22 NOTE — PLAN OF CARE
Problem: Patient Care Overview  Goal: Plan of Care Review  Outcome: Ongoing (interventions implemented as appropriate)   08/21/17 2319   Coping/Psychosocial   Plan Of Care Reviewed With patient       Problem: Diabetes, Type 2 (Adult)  Goal: Signs and Symptoms of Listed Potential Problems Will be Absent, Minimized or Managed (Diabetes, Type 2)  Signs and symptoms of listed potential problems will be absent, minimized or managed by discharge/transition of care (reference Diabetes, Type 2 (Adult) CPG).   Outcome: Ongoing (interventions implemented as appropriate)   08/21/17 2319   Diabetes, Type 2   Problems Assessed (Type 2 Diabetes) all   Problems Present (Type 2 Diabetes) none       Problem: Fall Risk (Adult)  Goal: Absence of Falls  Patient will demonstrate the desired outcomes by discharge/transition of care.   Outcome: Ongoing (interventions implemented as appropriate)   08/21/17 2319   Fall Risk (Adult)   Absence of Falls making progress toward outcome       Comments: AOX4, afebrile,not in cp distress, room air, calm, cooperative with care and denies pain. BS 78 asymptomatic, given snacks which pt took 100%. Noted no active skin breakdown. Bruising noted at all extremities. Medication given and pt tolerated. Call bell within reached, commode at bedside and bed in lowest position. Pt can independently repositioned herself in bed and monitor for pain and safety.Will checked BS at 2 am per order.

## 2017-08-22 NOTE — ASSESSMENT & PLAN NOTE
-No further N/V  -Continue medical therapy with ASA 81mg daily, clopidogrel 75mg daily, atorvastatin 40mg daily, metoprolol 12.5mg daily  -Continue GI prophylaxis with pantoprazole

## 2017-08-22 NOTE — PT/OT/SLP PROGRESS
Physical Therapy  Treatment    Irma Pedroza   MRN: [id number]   Admitting Diagnosis: Debility    PT Received On: 08/22/17          Billable Minutes:  Gait [id number], Therapeutic Activity 10 and Therapeutic Exercise 20=45    Treatment Type: Treatment  PT/PTA: PT     PTA Visit Number: 0       General Precautions: Standard, fall, hearing impaired  Orthopedic Precautions: N/A   Braces: N/A    Do you have any cultural, spiritual, Worship conflicts, given your current situation?: Yazdanism    Subjective:  Communicated with patient prior to session.  Patient agreeable to therapy    Pain/Comfort  Pain Rating 1: 0/10  Pain Rating Post-Intervention 1: 0/10    Objective:  Patient found seated in w/c with         Functional Status:  MDS G  Transfer Functional Status: CGA-Min (A)  Walk in Room Functional Status: CGA-Min (A)  Walk in Corridor Functional Status: CGA-Min (A)  Locomotion on Unit Functional Status: CGA-Min (A)  Toilet Use Functional Status: CGA-Min (A)          Bed Mobility:  Sit>Supine:NT  Supine>Sit: NT    Transfers:  Sit<>Stand: to/from w/c w/ RW and CGA x3 trials.  Stand Pivot Transfer: w/c<>BSC over toilet w/ min assist/HHA and grab bar. SPT w/c>sit EOB w/o AD w/ HHA for balance      Gait:  Amb 150 feet x2 trials w/ RW and CGA/SBA. Slow pace. Mild steppage gait w/ LLE shorter than RLE, achieves foot flat w/ step.    Seated rest break between trials  Advanced Gait:  Curb Step: up/down 4 inch curb step w/ RW and CGA     Therex:  Quad sets,   Glute sets,   Ankle  Pumps,   hip abduction/adduction,  LAQ   Hip flexion    x20 reps w/ assist as needed.      Additional Treatment:  Patient pedaled LBE (lower body ergometer) x15 minutes to increase strength and endurance.  Patient assisted w/ toileting. PT assists w/ clothes. Patient performs hygiene w/ set up.    Patient left sitting EOB with call button in reach.    Assessment:  Irma Pedroza is a 89 y.o. female with a medical diagnosis of Debility.  She is  progressing well. Continues to need CGA for gait and transfers. Patient will benefit from continued physical therapy to address deficits and improve safety and functional mobility. Continue with physical therapy plan of care. .    Rehab identified problem list/impairments: weakness, impaired endurance, impaired sensation, impaired functional mobilty, gait instability, impaired balance, decreased lower extremity function, pain    Rehab potential is good.    Activity tolerance: Good    Discharge recommendations: home with home health     Barriers to discharge: None    Equipment recommendations: none     GOALS:    Physical Therapy Goals        Problem: Physical Therapy Goal    Goal Priority Disciplines Outcome Goal Variances Interventions   Physical Therapy Goal     PT/OT, PT Ongoing (interventions implemented as appropriate)     Description:  Goals to be met by: 12 days     Patient will increase functional independence with mobility by performin. Supine to sit with Modified Sloan  2. Sit to supine with Modified Sloan  3. Sit to stand transfer with Supervision  4. Bed to chair transfer with Supervision using Rolling Walker  5. Gait  x 150 feet with Supervision using Rolling Walker.   6. Ascend/Descend 4 inch curb step with Stand-by Assistance using Rolling Walker.  7. Stand for 3 minutes with Stand-by Assistance using Rolling Walker while performing dynamic standing activity  8. Lower extremity exercise program x20 reps per handout, with assistance as needed  9. Pt will improve TUG time to <30s w/ RW in order to decrease fall risk                       PLAN:    Patient to be seen 5 x/week  to address the above listed problems via gait training, therapeutic activities, therapeutic exercises  Plan of Care expires: 17  Plan of Care reviewed with: patient    Sabrina Ansari, PT  2017

## 2017-08-22 NOTE — PLAN OF CARE
Problem: Patient Care Overview  Goal: Plan of Care Review  Outcome: Revised  Repositions independently, no new skin breakdowns noted. Sacral scab dry and intact. Afebrile. monitored for pain and safety q 1-2hrs this shift. Safety maintained. Denies pain

## 2017-08-22 NOTE — ASSESSMENT & PLAN NOTE
-Patient chronically on lorazepam and alprazolam at home  -Hold alprazolam as patient without any anxious symptoms currently  -Will continue to monitor.

## 2017-08-22 NOTE — SUBJECTIVE & OBJECTIVE
Hospital Course:  The patient was admitted at [facility] from 8/14 to 8/17/2017.  8/17/17: Patient admitted to SNF for ongoing PT/OT following a hospitalization for STEMI.    Interval History: Patient seen at bedside, denies chest pain, no acute events overnight.    Review of Systems   Constitutional: Negative for appetite change, chills, fatigue and fever.   HENT: Negative for trouble swallowing.    Respiratory: Negative for cough, chest tightness, shortness of breath and wheezing.    Cardiovascular: Negative for chest pain, palpitations and leg swelling.   Gastrointestinal: Negative for abdominal pain, constipation, diarrhea and nausea.   Genitourinary: Negative for difficulty urinating, frequency and urgency.   Musculoskeletal: Negative for arthralgias and myalgias.   Skin: Negative for rash.   Neurological: Negative for dizziness, weakness, light-headedness and headaches.   Psychiatric/Behavioral: Negative for sleep disturbance.     Scheduled Meds:   aspirin  81 mg Oral Daily    atorvastatin  40 mg Oral Daily    clopidogrel  75 mg Oral Daily    enoxaparin  30 mg Subcutaneous Daily    [START ON 8/22/2017] insulin detemir  27 Units Subcutaneous Daily    levothyroxine  75 mcg Oral Before breakfast    linagliptin  5 mg Oral Daily    metoprolol succinate  12.5 mg Oral Daily    pantoprazole  40 mg Oral Daily    polyethylene glycol  17 g Oral Daily     Continuous Infusions:   PRN Meds:.acetaminophen, aluminum-magnesium hydroxide-simethicone, dextrose 50%, dextrose 50%, glucagon (human recombinant), glucose, glucose, insulin aspart, metoclopramide HCl, ondansetron, polyethylene glycol, ramelteon, senna-docusate 8.6-50 mg    Objective:     Vital Signs (Most Recent):  Temp: 98.7 °F (37.1 °C) (08/21/17 1915)  Pulse: 75 (08/21/17 1915)  Resp: 18 (08/21/17 1915)  BP: 115/75 (08/21/17 1915)  SpO2: 97 % (08/21/17 1915) Vital Signs (24h Range):  Temp:  [97.8 °F (36.6 °C)-98.7 °F (37.1 °C)] 98.7 °F (37.1 °C)  Pulse:   [73-75] 75  Resp:  [18] 18  SpO2:  [97 %-99 %] 97 %  BP: (108-115)/(55-75) 115/75     Weight: 54 kg (119 lb 0.8 oz)  Body mass index is 21.77 kg/m².    Intake/Output Summary (Last 24 hours) at 08/21/17 2137  Last data filed at 08/21/17 1230   Gross per 24 hour   Intake              790 ml   Output                3 ml   Net              787 ml      Physical Exam   Constitutional: She is oriented to person, place, and time. She appears well-developed and well-nourished. No distress.   Cardiovascular: Normal rate, regular rhythm and normal heart sounds.  Exam reveals no gallop and no friction rub.    No murmur heard.  Pulmonary/Chest: Effort normal and breath sounds normal. No respiratory distress. She has no wheezes. She has no rales.   Abdominal: Soft. Bowel sounds are normal. She exhibits no distension. There is no tenderness.   Musculoskeletal: Normal range of motion. She exhibits no edema or tenderness.   Neurological: She is alert and oriented to person, place, and time.   Skin: Skin is warm and dry. No rash noted. She is not diaphoretic. No cyanosis. Nails show no clubbing.   Psychiatric: She has a normal mood and affect. Her behavior is normal.     Significant Labs:     Recent Labs  Lab 08/15/17  0305 08/21/17  0433   WBC 8.81  8.81  8.81 6.50   HGB 12.3  12.3  12.3 12.7   HCT 36.1*  36.1*  36.1* 39.1     202  202 207         Recent Labs  Lab 08/15/17  0305 08/21/17  0433     136 141   K 3.6  3.6 3.8     108 107   CO2 22*  22* 26   BUN 28  28 23   CREATININE 0.8  0.8 0.9   CALCIUM 8.2*  8.2* 8.7   PROT 5.2*  5.2*  --    BILITOT 0.5  0.5  --    ALKPHOS 87  87  --    *  198*  --    *  246*  --      Lab Results   Component Value Date    LABPROT 10.9 08/14/2017    ALBUMIN 2.6 (L) 08/15/2017    ALBUMIN 2.6 (L) 08/15/2017     Lab Results   Component Value Date    CALCIUM 8.7 08/21/2017    PHOS 4.5 08/21/2017     POCT Glucose   Date Value Ref Range Status    08/21/2017 78 70 - 110 mg/dL Final   08/21/2017 79 70 - 110 mg/dL Final   08/21/2017 397 (H) 70 - 110 mg/dL Final   08/21/2017 330 (H) 70 - 110 mg/dL Final   08/21/2017 67 (L) 70 - 110 mg/dL Final   08/20/2017 147 (H) 70 - 110 mg/dL Final   08/20/2017 249 (H) 70 - 110 mg/dL Final   08/20/2017 321 (H) 70 - 110 mg/dL Final   08/20/2017 368 (H) 70 - 110 mg/dL Final   08/20/2017 89 70 - 110 mg/dL Final   08/19/2017 118 (H) 70 - 110 mg/dL Final   08/19/2017 78 70 - 110 mg/dL Final   08/19/2017 212 (H) 70 - 110 mg/dL Final   08/19/2017 370 (H) 70 - 110 mg/dL Final   08/19/2017 378 (H) 70 - 110 mg/dL Final   08/19/2017 387 (H) 70 - 110 mg/dL Final   08/19/2017 194 (H) 70 - 110 mg/dL Final   Results for RAMÍREZ RAMIREZ (MRN [id number]) as of 8/21/2017 21:41   Ref. Range 8/21/2017 04:33   Magnesium Latest Ref Range: 1.6 - 2.6 mg/dL 1.9     Significant Imaging: n/a

## 2017-08-22 NOTE — PLAN OF CARE
Problem: Physical Therapy Goal  Goal: Physical Therapy Goal  Goals to be met by: 12 days     Patient will increase functional independence with mobility by performin. Supine to sit with Modified Isabela  2. Sit to supine with Modified Isabela  3. Sit to stand transfer with Supervision  4. Bed to chair transfer with Supervision using Rolling Walker  5. Gait  x 150 feet with Supervision using Rolling Walker.   6. Ascend/Descend 4 inch curb step with Stand-by Assistance using Rolling Walker.  7. Stand for 3 minutes with Stand-by Assistance using Rolling Walker while performing dynamic standing activity  8. Lower extremity exercise program x20 reps per handout, with assistance as needed  9. Pt will improve TUG time to <30s w/ RW in order to decrease fall risk      Goals remain appropriate. Continue with Physical therapy Plan of Care. Sabrina Ansari, PT 2017

## 2017-08-23 PROBLEM — I50.30 DIASTOLIC HEART FAILURE: Status: ACTIVE | Noted: 2017-08-23

## 2017-08-23 LAB
BNP SERPL-MCNC: 2292 PG/ML
POCT GLUCOSE: 202 MG/DL (ref 70–110)
POCT GLUCOSE: 236 MG/DL (ref 70–110)
POCT GLUCOSE: 323 MG/DL (ref 70–110)
POCT GLUCOSE: >400 MG/DL (ref 70–110)
POCT GLUCOSE: >400 MG/DL (ref 70–110)

## 2017-08-23 PROCEDURE — 83880 ASSAY OF NATRIURETIC PEPTIDE: CPT

## 2017-08-23 PROCEDURE — 63600175 PHARM REV CODE 636 W HCPCS: Performed by: NURSE PRACTITIONER

## 2017-08-23 PROCEDURE — 36415 COLL VENOUS BLD VENIPUNCTURE: CPT

## 2017-08-23 PROCEDURE — 25000242 PHARM REV CODE 250 ALT 637 W/ HCPCS: Performed by: NURSE PRACTITIONER

## 2017-08-23 PROCEDURE — 63600175 PHARM REV CODE 636 W HCPCS: Performed by: INTERNAL MEDICINE

## 2017-08-23 PROCEDURE — 25000242 PHARM REV CODE 250 ALT 637 W/ HCPCS: Performed by: PHYSICIAN ASSISTANT

## 2017-08-23 PROCEDURE — 99308 SBSQ NF CARE LOW MDM 20: CPT | Mod: ,,, | Performed by: NURSE PRACTITIONER

## 2017-08-23 PROCEDURE — 27000221 HC OXYGEN, UP TO 24 HOURS

## 2017-08-23 PROCEDURE — 12000000 HC SNF SEMI-PRIVATE ROOM

## 2017-08-23 PROCEDURE — 25000003 PHARM REV CODE 250: Performed by: INTERNAL MEDICINE

## 2017-08-23 PROCEDURE — 97535 SELF CARE MNGMENT TRAINING: CPT

## 2017-08-23 PROCEDURE — 97802 MEDICAL NUTRITION INDIV IN: CPT | Performed by: NUTRITIONIST

## 2017-08-23 PROCEDURE — 97116 GAIT TRAINING THERAPY: CPT

## 2017-08-23 PROCEDURE — 97110 THERAPEUTIC EXERCISES: CPT

## 2017-08-23 PROCEDURE — 97530 THERAPEUTIC ACTIVITIES: CPT

## 2017-08-23 PROCEDURE — 25000003 PHARM REV CODE 250: Performed by: NURSE PRACTITIONER

## 2017-08-23 PROCEDURE — 94640 AIRWAY INHALATION TREATMENT: CPT

## 2017-08-23 RX ORDER — FUROSEMIDE 20 MG/1
20 TABLET ORAL ONCE
Status: COMPLETED | OUTPATIENT
Start: 2017-08-23 | End: 2017-08-23

## 2017-08-23 RX ORDER — PREDNISONE 20 MG/1
40 TABLET ORAL ONCE
Status: COMPLETED | OUTPATIENT
Start: 2017-08-23 | End: 2017-08-23

## 2017-08-23 RX ORDER — PREDNISONE 20 MG/1
40 TABLET ORAL ONCE
Status: DISCONTINUED | OUTPATIENT
Start: 2017-08-23 | End: 2017-08-23

## 2017-08-23 RX ORDER — FUROSEMIDE 20 MG/1
20 TABLET ORAL DAILY
Status: DISCONTINUED | OUTPATIENT
Start: 2017-08-24 | End: 2017-08-25 | Stop reason: HOSPADM

## 2017-08-23 RX ORDER — IPRATROPIUM BROMIDE AND ALBUTEROL SULFATE 2.5; .5 MG/3ML; MG/3ML
3 SOLUTION RESPIRATORY (INHALATION) EVERY 4 HOURS PRN
Status: DISCONTINUED | OUTPATIENT
Start: 2017-08-23 | End: 2017-08-25 | Stop reason: HOSPADM

## 2017-08-23 RX ORDER — IPRATROPIUM BROMIDE AND ALBUTEROL SULFATE 2.5; .5 MG/3ML; MG/3ML
3 SOLUTION RESPIRATORY (INHALATION) EVERY 4 HOURS PRN
Status: DISCONTINUED | OUTPATIENT
Start: 2017-08-23 | End: 2017-08-23

## 2017-08-23 RX ADMIN — INSULIN ASPART 4 UNITS: 100 INJECTION, SOLUTION INTRAVENOUS; SUBCUTANEOUS at 05:08

## 2017-08-23 RX ADMIN — PANTOPRAZOLE SODIUM 40 MG: 40 TABLET, DELAYED RELEASE ORAL at 08:08

## 2017-08-23 RX ADMIN — IPRATROPIUM BROMIDE AND ALBUTEROL SULFATE 3 ML: .5; 3 SOLUTION RESPIRATORY (INHALATION) at 01:08

## 2017-08-23 RX ADMIN — INSULIN ASPART 2 UNITS: 100 INJECTION, SOLUTION INTRAVENOUS; SUBCUTANEOUS at 08:08

## 2017-08-23 RX ADMIN — RAMELTEON 8 MG: 8 TABLET, FILM COATED ORAL at 10:08

## 2017-08-23 RX ADMIN — ACETAMINOPHEN 650 MG: 325 TABLET ORAL at 12:08

## 2017-08-23 RX ADMIN — ATORVASTATIN CALCIUM 40 MG: 20 TABLET, FILM COATED ORAL at 08:08

## 2017-08-23 RX ADMIN — PREDNISONE 40 MG: 20 TABLET ORAL at 05:08

## 2017-08-23 RX ADMIN — ENOXAPARIN SODIUM 30 MG: 100 INJECTION SUBCUTANEOUS at 05:08

## 2017-08-23 RX ADMIN — FUROSEMIDE 20 MG: 20 TABLET ORAL at 10:08

## 2017-08-23 RX ADMIN — INSULIN ASPART 5 UNITS: 100 INJECTION, SOLUTION INTRAVENOUS; SUBCUTANEOUS at 12:08

## 2017-08-23 RX ADMIN — INSULIN ASPART 2 UNITS: 100 INJECTION, SOLUTION INTRAVENOUS; SUBCUTANEOUS at 09:08

## 2017-08-23 RX ADMIN — LEVOTHYROXINE SODIUM 75 MCG: 75 TABLET ORAL at 05:08

## 2017-08-23 RX ADMIN — IPRATROPIUM BROMIDE AND ALBUTEROL SULFATE 3 ML: .5; 3 SOLUTION RESPIRATORY (INHALATION) at 10:08

## 2017-08-23 RX ADMIN — METOPROLOL SUCCINATE 12.5 MG: 25 TABLET, EXTENDED RELEASE ORAL at 08:08

## 2017-08-23 RX ADMIN — IPRATROPIUM BROMIDE AND ALBUTEROL SULFATE 3 ML: .5; 3 SOLUTION RESPIRATORY (INHALATION) at 05:08

## 2017-08-23 RX ADMIN — CLOPIDOGREL BISULFATE 75 MG: 75 TABLET ORAL at 08:08

## 2017-08-23 RX ADMIN — ACETAMINOPHEN 650 MG: 325 TABLET ORAL at 08:08

## 2017-08-23 RX ADMIN — ASPIRIN 81 MG: 81 TABLET, COATED ORAL at 08:08

## 2017-08-23 NOTE — NURSING
Pt complaining of sob. VS checked RR 20, temp 98.3, ME 88, 96% room air and /75, lung findings were in normal range. Repositioned and head bed elevated to 90 degrees. Charge nurse made aware. Pt checked after 5 mins pt claimed she's rosana.

## 2017-08-23 NOTE — PROGRESS NOTES
[facility] Department of Hospital Medicine  Progress Note    Patient Name: Irma Pedroza  MRN: [id number]  Code Status: DNR  Admission Date: 8/17/2017  Length of Stay: 6 days  Attending Physician: Milton Julian MD  Primary Care Provider: Leonidas Potts MD    Subjective:     Principal Problem:Debility    Chief Complaint/Reason for Admission: Debility    History of Present Illness:  Patient is a 89 y.o. female with DM2, hypothyroidism, HLP who presents to SNF after hospitalization at [facility] for STEMI with shock after developing N/V and malaise.  The patient did not wish for invasive therapy and was made DNR.  She was started on heparin and norepinephrine drip and now DAPT, high dose statin. She did receive amiodarone drip due to NSVT on telemetry.  The patient as confirmed by her daughter wishes to be DNR.  She denies any chest pain, N/V or dyspnea.  She also denies any musculoskeletal pain.  She has bee sleeping poorly and wishes to resume her lorazepam as she takes at home.  Her daughter has brought her Tradjenta which will be resumed. The patient has been admitted to SNF for ongoing PT/OT due to insufficient progress to go home safely from the hospital.    Hospital Course:  The patient was admitted at [facility] from 8/14 to 8/17/2017.  8/17/17: Patient admitted to SNF for ongoing PT/OT following a hospitalization for STEMI.  8/21/17: Patient seen at bedside, denies chest pain, labs reviewed  8/23/17: Patient sob overnight, placed on O2 given predinsone and resp treatments. This AM patient sitting on side of bed bathing with mild dyspnea, O2 in use, reports feeling better than last night. CXR with pulmonary edema, BNP 2200s, patient on Lasix, Patient this afternoon sitting on side of bed without O2 with normal resp rate and rhythm. Patient reports being comfortable.    Interval History: Patient with mild dyspnea, O2 in use, stating she is feeling better than overnight, currently  bathing at bedside    Review of Systems   Constitutional: Negative for appetite change.   HENT: Negative for trouble swallowing.    Respiratory: Positive for cough and shortness of breath. Negative for chest tightness.    Cardiovascular: Negative for chest pain, palpitations and leg swelling.     Scheduled Meds:   aspirin  81 mg Oral Daily    atorvastatin  40 mg Oral Daily    clopidogrel  75 mg Oral Daily    enoxaparin  30 mg Subcutaneous Daily    [START ON 8/24/2017] furosemide  20 mg Oral Daily    insulin detemir  27 Units Subcutaneous Daily    levothyroxine  75 mcg Oral Before breakfast    linagliptin  5 mg Oral Daily    metoprolol succinate  12.5 mg Oral Daily    pantoprazole  40 mg Oral Daily    polyethylene glycol  17 g Oral Daily     Continuous Infusions:   PRN Meds:.acetaminophen, albuterol-ipratropium 2.5mg-0.5mg/3mL, aluminum-magnesium hydroxide-simethicone, dextrose 50%, dextrose 50%, glucagon (human recombinant), glucose, glucose, insulin aspart, metoclopramide HCl, ondansetron, polyethylene glycol, ramelteon, senna-docusate 8.6-50 mg    Objective:     Vital Signs (Most Recent):  Temp: 97 °F (36.1 °C) (08/23/17 0735)  Pulse: 81 (08/23/17 1014)  Resp: 19 (08/23/17 1014)  BP: 122/74 (08/23/17 0735)  SpO2: 97 % (08/23/17 1014) Vital Signs (24h Range):  Temp:  [97 °F (36.1 °C)-98.3 °F (36.8 °C)] 97 °F (36.1 °C)  Pulse:  [71-91] 81  Resp:  [16-26] 19  SpO2:  [93 %-97 %] 97 %  BP: (122-152)/(61-75) 122/74     Weight: 54.3 kg (119 lb 11.4 oz)  Body mass index is 21.9 kg/m².    Intake/Output Summary (Last 24 hours) at 08/23/17 1533  Last data filed at 08/23/17 0830   Gross per 24 hour   Intake              440 ml   Output                0 ml   Net              440 ml      Physical Exam   Constitutional: She is oriented to person, place, and time. No distress.   Cardiovascular: Normal rate, regular rhythm and normal heart sounds.    Pulmonary/Chest: Tachypnea noted. She has decreased breath sounds. She  has wheezes in the right lower field and the left lower field.   Musculoskeletal: She exhibits no edema.   Neurological: She is alert and oriented to person, place, and time.   Skin: Skin is warm and dry. She is not diaphoretic.     Significant Labs:     Recent Labs  Lab 08/21/17  0433   WBC 6.50   HGB 12.7   HCT 39.1          Recent Labs  Lab 08/21/17  0433 08/22/17  0521     --    K 3.8  --      --    CO2 26  --    BUN 23  --    CREATININE 0.9  --    CALCIUM 8.7  --    PROT  --  5.9*   BILITOT  --  0.4   ALKPHOS  --  76   ALT  --  37   AST  --  30     Lab Results   Component Value Date    LABPROT 10.9 08/14/2017    ALBUMIN 2.6 (L) 08/22/2017     Lab Results   Component Value Date    CALCIUM 8.7 08/21/2017    PHOS 4.5 08/21/2017     POCT Glucose   Date Value Ref Range Status   08/23/2017 >400 (H) 70 - 110 mg/dL Final   08/23/2017 >400 (H) 70 - 110 mg/dL Final   08/23/2017 236 (H) 70 - 110 mg/dL Final   08/23/2017 202 (H) 70 - 110 mg/dL Final   08/22/2017 276 (H) 70 - 110 mg/dL Final   08/22/2017 210 (H) 70 - 110 mg/dL Final   08/22/2017 319 (H) 70 - 110 mg/dL Final   08/22/2017 79 70 - 110 mg/dL Final   08/22/2017 91 70 - 110 mg/dL Final   08/21/2017 78 70 - 110 mg/dL Final   08/21/2017 79 70 - 110 mg/dL Final   08/21/2017 397 (H) 70 - 110 mg/dL Final   08/21/2017 330 (H) 70 - 110 mg/dL Final   08/21/2017 67 (L) 70 - 110 mg/dL Final   08/20/2017 147 (H) 70 - 110 mg/dL Final   08/20/2017 249 (H) 70 - 110 mg/dL Final       Recent Labs  Lab 08/23/17  0943   BNP 2,292*       Significant Imaging:  Imaging Results          X-Ray Chest 1 View (Final result)  Result time 08/23/17 10:22:17    Final result by Laura Chaudhary MD (08/23/17 10:22:17)                 Impression:      Please see above.      Electronically signed by: Laura Cahudhary MD  Date:     08/23/17  Time:    10:22              Narrative:    Time of Procedure: 08/23/17 09:52:09  Accession # [id number]    Comparison: 2/2015.    Number of  views: 1.    Findings:  Calcific plaque is present at the level of the arch.  Mediastinal structures are midline.    There is mild blunting of the lateral costophrenic angles.  Reticulonodular pattern is present with perihilar and basilar predominance.  Findings suggest pulmonary edema with dependent pleural fluid in this elderly woman with calcific atherosclerosis.  Differential diagnosis includes infection and noninfectious inflammation including certain collagen vascular diseases.    I detect no pneumothorax, pneumomediastinum or pneumoperitoneum.  I doubt lymph node enlargement.    I detect no significant osseous abnormality allowing for the patient's advanced age.                                Assessment/Plan:      * Debility    -continue PT/OT to increase ambulation, ADL performance and endurance  -continue enoxaparin for DVT prophylaxis  -continue fall precautions  -continue miralax as she takes at home to prevent constipation; hold for frequent or loose stooling        DNR (do not resuscitate)    -Discussed with patient and daughter  -Paperwork completed.         Chronic combined systolic and diastolic heart failure    -Decompensated   -SOB, requires O2  -CXR with pulmonary edema  -BNP 2,292  -started on lasix  -Continue medical therapy with metoprolol and resume losartan as BP tolerates, SBP in low 100s continue to hold lasartan  -Monitor with daily weights        ST elevation myocardial infarction (STEMI)    -No further N/V  -Continue medical therapy with ASA 81mg daily, clopidogrel 75mg daily, atorvastatin 40mg daily, metoprolol 12.5mg daily  -Continue GI prophylaxis with pantoprazole        DM (diabetes mellitus)     -Poorly controlled  -Continue tradjenta with levemir and SSNI prn hyperglycemia, decreased levemir today from 30 to 27 daily  -check 2am blood sugar  -Continue diabetic diet  -will continue to monitor and adjust regimen as necessary  -Patient given prednisone overnight for SOB, now BG >400,  will monitor       Future Appointments  Date Time Provider Department Center   9/5/2017 8:40 AM Nicho Sandra MD [facility] CARDIO Candace Masterson NP  Department of Hospital Medicine  [facility]

## 2017-08-23 NOTE — SUBJECTIVE & OBJECTIVE
Hospital Course:  The patient was admitted at [facility] from 8/14 to 8/17/2017.  8/17/17: Patient admitted to SNF for ongoing PT/OT following a hospitalization for STEMI.  8/21/17: Patient seen at bedside, denies chest pain, labs reviewed  8/23/17: Patient sob overnight, placed on O2 given predinsone and resp treatments. This AM patient sitting on side of bed bathing with mild dyspnea, O2 in use, reports feeling better than last night. CXR with pulmonary edema, BNP 2200s, patient on Lasix, Patient this afternoon sitting on side of bed without O2 with normal resp rate and rhythm. Patient reports being comfortable.    Interval History: Patient with mild dyspnea, O2 in use, stating she is feeling better than overnight, currently bathing at bedside    Review of Systems   Constitutional: Negative for appetite change.   HENT: Negative for trouble swallowing.    Respiratory: Positive for cough and shortness of breath. Negative for chest tightness.    Cardiovascular: Negative for chest pain, palpitations and leg swelling.     Scheduled Meds:   aspirin  81 mg Oral Daily    atorvastatin  40 mg Oral Daily    clopidogrel  75 mg Oral Daily    enoxaparin  30 mg Subcutaneous Daily    [START ON 8/24/2017] furosemide  20 mg Oral Daily    insulin detemir  27 Units Subcutaneous Daily    levothyroxine  75 mcg Oral Before breakfast    linagliptin  5 mg Oral Daily    metoprolol succinate  12.5 mg Oral Daily    pantoprazole  40 mg Oral Daily    polyethylene glycol  17 g Oral Daily     Continuous Infusions:   PRN Meds:.acetaminophen, albuterol-ipratropium 2.5mg-0.5mg/3mL, aluminum-magnesium hydroxide-simethicone, dextrose 50%, dextrose 50%, glucagon (human recombinant), glucose, glucose, insulin aspart, metoclopramide HCl, ondansetron, polyethylene glycol, ramelteon, senna-docusate 8.6-50 mg    Objective:     Vital Signs (Most Recent):  Temp: 97 °F (36.1 °C) (08/23/17 0735)  Pulse: 81 (08/23/17 1014)  Resp: 19 (08/23/17  1014)  BP: 122/74 (08/23/17 0735)  SpO2: 97 % (08/23/17 1014) Vital Signs (24h Range):  Temp:  [97 °F (36.1 °C)-98.3 °F (36.8 °C)] 97 °F (36.1 °C)  Pulse:  [71-91] 81  Resp:  [16-26] 19  SpO2:  [93 %-97 %] 97 %  BP: (122-152)/(61-75) 122/74     Weight: 54.3 kg (119 lb 11.4 oz)  Body mass index is 21.9 kg/m².    Intake/Output Summary (Last 24 hours) at 08/23/17 1533  Last data filed at 08/23/17 0830   Gross per 24 hour   Intake              440 ml   Output                0 ml   Net              440 ml      Physical Exam   Constitutional: She is oriented to person, place, and time. No distress.   Cardiovascular: Normal rate, regular rhythm and normal heart sounds.    Pulmonary/Chest: Tachypnea noted. She has decreased breath sounds. She has wheezes in the right lower field and the left lower field.   Musculoskeletal: She exhibits no edema.   Neurological: She is alert and oriented to person, place, and time.   Skin: Skin is warm and dry. She is not diaphoretic.     Significant Labs:     Recent Labs  Lab 08/21/17  0433   WBC 6.50   HGB 12.7   HCT 39.1          Recent Labs  Lab 08/21/17  0433 08/22/17  0521     --    K 3.8  --      --    CO2 26  --    BUN 23  --    CREATININE 0.9  --    CALCIUM 8.7  --    PROT  --  5.9*   BILITOT  --  0.4   ALKPHOS  --  76   ALT  --  37   AST  --  30     Lab Results   Component Value Date    LABPROT 10.9 08/14/2017    ALBUMIN 2.6 (L) 08/22/2017     Lab Results   Component Value Date    CALCIUM 8.7 08/21/2017    PHOS 4.5 08/21/2017     POCT Glucose   Date Value Ref Range Status   08/23/2017 >400 (H) 70 - 110 mg/dL Final   08/23/2017 >400 (H) 70 - 110 mg/dL Final   08/23/2017 236 (H) 70 - 110 mg/dL Final   08/23/2017 202 (H) 70 - 110 mg/dL Final   08/22/2017 276 (H) 70 - 110 mg/dL Final   08/22/2017 210 (H) 70 - 110 mg/dL Final   08/22/2017 319 (H) 70 - 110 mg/dL Final   08/22/2017 79 70 - 110 mg/dL Final   08/22/2017 91 70 - 110 mg/dL Final   08/21/2017 78 70 - 110  mg/dL Final   08/21/2017 79 70 - 110 mg/dL Final   08/21/2017 397 (H) 70 - 110 mg/dL Final   08/21/2017 330 (H) 70 - 110 mg/dL Final   08/21/2017 67 (L) 70 - 110 mg/dL Final   08/20/2017 147 (H) 70 - 110 mg/dL Final   08/20/2017 249 (H) 70 - 110 mg/dL Final       Recent Labs  Lab 08/23/17  0943   BNP 2,292*       Significant Imaging:  Imaging Results          X-Ray Chest 1 View (Final result)  Result time 08/23/17 10:22:17    Final result by Laura Chaudhary MD (08/23/17 10:22:17)                 Impression:      Please see above.      Electronically signed by: Laura Chaudhary MD  Date:     08/23/17  Time:    10:22              Narrative:    Time of Procedure: 08/23/17 09:52:09  Accession # [id number]    Comparison: 2/2015.    Number of views: 1.    Findings:  Calcific plaque is present at the level of the arch.  Mediastinal structures are midline.    There is mild blunting of the lateral costophrenic angles.  Reticulonodular pattern is present with perihilar and basilar predominance.  Findings suggest pulmonary edema with dependent pleural fluid in this elderly woman with calcific atherosclerosis.  Differential diagnosis includes infection and noninfectious inflammation including certain collagen vascular diseases.    I detect no pneumothorax, pneumomediastinum or pneumoperitoneum.  I doubt lymph node enlargement.    I detect no significant osseous abnormality allowing for the patient's advanced age.

## 2017-08-23 NOTE — ASSESSMENT & PLAN NOTE
-Decompensated   -SOB, requires O2  -CXR with pulmonary edema  -BNP 2,292  -started on lasix  -Continue medical therapy with metoprolol and resume losartan as BP tolerates, SBP in low 100s continue to hold lasartan  -Monitor with daily weights

## 2017-08-23 NOTE — NURSING
Patient continues with expiratory wheezing. Resp 22-26 bpm. At rest with eyes closed. HOB up at 45 degrees. O2 remains in use at 2 liters per N/C. Not in any apparent distress, Call placed to on-call service again for further advice.

## 2017-08-23 NOTE — PLAN OF CARE
Problem: Patient Care Overview  Goal: Plan of Care Review  Outcome: Ongoing (interventions implemented as appropriate)   08/22/17 2352   Coping/Psychosocial   Plan Of Care Reviewed With patient       Problem: Diabetes, Type 2 (Adult)  Goal: Signs and Symptoms of Listed Potential Problems Will be Absent, Minimized or Managed (Diabetes, Type 2)  Signs and symptoms of listed potential problems will be absent, minimized or managed by discharge/transition of care (reference Diabetes, Type 2 (Adult) CPG).   Outcome: Ongoing (interventions implemented as appropriate)   08/22/17 2352   Diabetes, Type 2   Problems Assessed (Type 2 Diabetes) all   Problems Present (Type 2 Diabetes) none       Problem: Fall Risk (Adult)  Goal: Absence of Falls  Patient will demonstrate the desired outcomes by discharge/transition of care.   Outcome: Ongoing (interventions implemented as appropriate)   08/22/17 2352   Fall Risk (Adult)   Absence of Falls making progress toward outcome

## 2017-08-23 NOTE — NURSING
At 0035 AM pt complained of 2nd episode of sob, VS 97.9 F, 93% room air, RR 26, TX of 88 denied of chest pain but complained of headache and ask for tylenol for 8/10 pain. At 0050 Pt was started to O2 @ 2 litters and VS was re checked RR 22, 96% @2 litters, /68 and TX 76. Lung findings positive for expiratory wheezing at all lung fields. Charge Nurse made referral to NP on call and order respiratory treatment which pt received form RT.

## 2017-08-23 NOTE — PHYSICIAN QUERY
"PT Name: Irma Pedroza  MR #: [id number]    Physician Query Form - Heart  Condition Clarification     CDS/: Martha Linares               Contact information: [email or web address]   This form is a permanent document in the medical record.     Query Date: August 23, 2017    By submitting this query, we are merely seeking further clarification of documentation. Please utilize your independent clinical judgment when addressing the question(s) below.    The medical record contains the following   Indicators     Supporting Clinical Findings Location in Medical Record    BNP     X EF EF 25 2D echo 8/14    Radiology findings     X Echo Results  1 - Severely depressed left ventricular systolic function (EF 25-30%).     2 - Wall motion abnormalities.     3 - Impaired LV relaxation, increased LVEDP.     4 - Pulmonary hypertension. The estimated PA systolic pressure is 51mmHg.     5 - Moderate aortic regurgitation.     6 - Mild mitral regurgitation.     7 - Moderate left atrial enlargement.  2D echo 8/14    "Ascites" documented      "SOB" or "LOMAX" documented      "Hypoxia" documented     X Heart Failure documented Chronic combined systolic and diastolic heart failure  Problem list     "Edema" documented     X Diuretics/Meds Metoprolol succinate 12.5mg po q day  MAR and home med list     Treatment:      Other:          Provider, please specify diagnosis or diagnoses associated with above clinical findings.                                 [ xx ] Chronic Combined Systolic and Diastolic Heart Failure    [  ] Other Type of Heart Failure (please specify type): _________________________    [  ] Heart Failure Ruled Out    [  ] Other (please specify): ___________________________________    [  ] Clinically Undetermined            *American Heart Association                                                                                                          Please document in your progress notes daily for the duration of " treatment until resolved and include in your discharge summary.

## 2017-08-23 NOTE — CONSULTS
"  [facility]  Adult Nutrition  Consult Note    SUMMARY     Recommendations    Recommendation/Intervention: 1. Continue Rx diet  2. Monitor PO intake  3. RD to follow     Nutrition Goal Status: goal met  Communication of RD Recs: reviewed with RN    Continuum of Care Plan         Reason for Assessment    Reason for Assessment: physician consult  Diagnosis:  (Debility)  Relevent Medical History: DM2, Thyroid, HLD   Interdisciplinary Rounds: did not attend          Nutrition Discharge Planning: Home on PO diet with adequate intake    Nutrition Prescription Ordered    Current Diet Order: Diabetic 1800 calorie  Nutrition Order Comments: Good PO consuming %                 Evaluation of Received Nutrients/Fluid Intake  Energy Calories Required: meeting needs                 Protein Required: meeting needs  Fluid Required: meeting needs     Tolerance: tolerating      Nutrition Risk Screen     Nutrition Risk Screen: no indicators present    Nutrition/Diet History    Patient Reported Diet/Restrictions/Preferences: general  Typical Food/Fluid Intake: Good prior to admit  Food Preferences: Reviewed    Labs/Tests/Procedures/Meds       Pertinent Labs Reviewed: reviewed, pertinent     Pertinent Medications Reviewed: reviewed, pertinent       Physical Findings          Oral/Mouth Cavity: dental applicance present (specify)  Skin: intact    Anthropometrics    Temp: 97 °F (36.1 °C)  Height Method: Stated  Height: 5' 2" (157.5 cm)  Weight Method: Standard Scale  Weight: 54.3 kg (119 lb 11.4 oz)     Ideal Body Weight (IBW), Female: 110 lb     % Ideal Body Weight, Female (lb): 108.83 lb  BMI (Calculated): 21.9  BMI Grade: 18.5-24.9 - normal    Estimated/Assessed Needs    Weight Used For Calorie Calculations: 54.3 kg (119 lb 11.4 oz)      Energy Calorie Requirements (kcal): 9898-6204  Energy Need Method: Kcal/kg (25-30kcals/kg/BW)       RMR ([place]-St. Jeor Equation): 911.25        Weight Used For Protein " Calculations: 54.3 kg (119 lb 11.4 oz)  Protein Requirements: 55-65 (1.0-1.2gmsProtein/kg/BW)       Fluid Need Method: RDA Method (1kcal/1ml or MD Rx)        RDA Method (mL): 1400               Assessment and Plan    No nutrition diagnosis at this time    Monitor and Evaluation    Food and Nutrient Intake: food and beverage intake  Food and Nutrient Adminstration: diet order     Physical Activity and Function: nutrition-related ADLs and IADLs  Anthropometric Measurements: weight, weight change  Biochemical Data, Medical Tests and Procedures: electrolyte and renal panel, gastrointestinal profile, glucose/endocrine profile, inflammatory profile  Nutrition-Focused Physical Findings: overall appearance    Nutrition Risk    Level of Risk: (S) other (see comments) (Follow up x1/weel)    Nutrition Follow-Up    RD Follow-up?: Yes

## 2017-08-23 NOTE — ASSESSMENT & PLAN NOTE
-Poorly controlled  -Continue tradjenta with levemir and SSNI prn hyperglycemia, decreased levemir today from 30 to 27 daily  -check 2am blood sugar  -Continue diabetic diet  -will continue to monitor and adjust regimen as necessary  -Patient given prednisone overnight for SOB, now BG >400, will monitor

## 2017-08-23 NOTE — PLAN OF CARE
Problem: Patient Care Overview  Goal: Plan of Care Review  Outcome: Ongoing (interventions implemented as appropriate)   08/23/17 1649   Coping/Psychosocial   Plan Of Care Reviewed With patient       Problem: Fall Risk (Adult)  Goal: Absence of Falls  Patient will demonstrate the desired outcomes by discharge/transition of care.   Outcome: Ongoing (interventions implemented as appropriate)   08/23/17 1649   Fall Risk (Adult)   Absence of Falls making progress toward outcome       Problem: Pressure Ulcer Risk (Liam Scale) (Adult,Obstetrics,Pediatric)  Goal: Skin Integrity  Patient will demonstrate the desired outcomes by discharge/transition of care.   Outcome: Ongoing (interventions implemented as appropriate)   08/23/17 1649   Pressure Ulcer Risk (Liam Scale) (Adult,Obstetrics,Pediatric)   Skin Integrity making progress toward outcome       Comments: Patient monitored every 1 to 2 hours for pain and  Safety.  Safety maintained.  Patient instructed to call for assistance.Blood glucose monitored  before meals and bed.Call  Light and persoanl items in reach.

## 2017-08-23 NOTE — PT/OT/SLP PROGRESS
Occupational Therapy  Treatment    Irma Pedroza   MRN: [id number]   Admitting Diagnosis: Debility    OT Date of Treatment: 08/23/17  Total Time (min): 49 min    Billable Minutes:  Self Care/Home Management 9, Therapeutic Activity 20 and Therapeutic Exercise 20    General Precautions: Standard, fall, hearing impaired  Orthopedic Precautions: N/A  Braces: N/A    Do you have any cultural, spiritual, Confucianist conflicts, given your current situation?: Nondenominational    Subjective:  Communicated with nurse prior to session. (Nurse reported pt. sats at 98% and does not need to be on 02)  I had such a bad night last night.  I just couldn't catch my breath.      Pain/Comfort  Pain Rating 1: 0/10  Pain Rating Post-Intervention 1: 0/10    Objective:  Patient found with:  (seated in w/c with son present)    Functional Status:  MDS G  Transfer Functional Status: CGA sit to stand from w/c x multiple trials  Transfer Level of (A):  (with RW)  Dressing Functional Status: 2:Min (A) UBD to don gown like robe  Moving from seated to standing position: Not steady, only able to stabilize with staff assistance  Surface-to-surface transfer (transfer between bed and chair or wheelchair): Not steady, only able to stabilize with staff assistance          OT Exercises: AROM with 1 # dowel for all major planes of BUE motion x 2 sets 10 reps    Additional Treatment:  Pt. Engaged in dynamic standing task at table with seated rest breaks as needed with SBA for task completion.  Pt. Stood x 3 trials 1st trial:  Stood less than 2 minutes HR 79 and 02 sats 96%                                 2nd trial:  Stood 2 minutes and 20 seconds HR 91 02 sats 90% and after 1 minute returned to 94%                                  3 rd trial Stood 3 minutes 29 seconds (unable to obtain 02 sats reading or HR 2/2 long nails and cold fingers 2 pulse ox machines attempted.                                  Pt. Did however report feeling fine following 3rd stand.  02 sats  taken when back in room and noted to be 95%.      Pt. Educated on energy conservation techniques.    Patient left up in chair with call button in reach and nurse notified    ASSESSMENT:  Irma Pedroza is a 89 y.o. female with a medical diagnosis of Debility and presents with unsteadiness with functional mobility as well as decreased endurance and self-care skills. Pt. Would like to return to home environment alone but is considering having assist which would be recommended at least initially for safety.  Pt. Would benefit from continued OT services to maximize safety and independence with ADL tasks.     Rehab identified problem list/impairments: impaired endurance, impaired self care skills, impaired functional mobilty, gait instability    Rehab potential is good    Activity tolerance: Fair    Discharge recommendations: home health OT (may require supervision initially on d/c)     Barriers to discharge: Decreased caregiver support (reports may need to hire someone )     Equipment recommendations: none     GOALS:    Occupational Therapy Goals        Problem: Occupational Therapy Goal    Goal Priority Disciplines Outcome Interventions   Occupational Therapy Goal     OT, PT/OT Ongoing (interventions implemented as appropriate)    Description:  Goals to be met by: 12 days     Patient will increase functional independence with ADLs by performing:    Feeding with Modified [place].  UE Dressing with Modified [place].  LE Dressing with Supervision.  Grooming while standing with Supervision.  Toileting from bedside commode with Supervision for hygiene and clothing management.   Bathing from  shower chair/bench with Supervision.  Supine to sit with Modified [place].  Stand pivot transfers with Supervision.  Toilet transfer to bedside commode with Supervision.  Pt. To be independent with HEP                      Plan:  Patient to be seen 5 x/week to address the above listed problems via self-care/home  management, therapeutic activities, therapeutic exercises  Plan of Care expires: 09/17/17  Plan of Care reviewed with: patient    Shellie ERIK Glover  08/23/2017

## 2017-08-23 NOTE — PLAN OF CARE
Problem: Occupational Therapy Goal  Goal: Occupational Therapy Goal  Goals to be met by: 12 days     Patient will increase functional independence with ADLs by performing:    Feeding with Modified [place].  UE Dressing with Modified [place].  LE Dressing with Supervision.  Grooming while standing with Supervision.  Toileting from bedside commode with Supervision for hygiene and clothing management.   Bathing from  shower chair/bench with Supervision.  Supine to sit with Modified [place].  Stand pivot transfers with Supervision.  Toilet transfer to bedside commode with Supervision.  Pt. To be independent with HEP     Pt. Tolerated session well.

## 2017-08-23 NOTE — PT/OT/SLP PROGRESS
Physical Therapy  Treatment    Irma Pedroza   MRN: [id number]   Admitting Diagnosis: Debility    PT Received On: 08/23/17          Billable Minutes:  Gait [id number], Therapeutic Activity 13 and Therapeutic Exercise 15=53    Treatment Type: Treatment  PT/PTA: PT     PTA Visit Number: 0       General Precautions: Standard, fall, hearing impaired  Orthopedic Precautions: N/A   Braces: N/A    Do you have any cultural, spiritual, Bahai conflicts, given your current situation?: Zoroastrianism    Subjective:  Communicated with patient prior to session.  Agreeable to therapy. Reports a 'bad time' last night w/ breathing and needed oxygen.    Pain/Comfort  Pain Rating 1: 0/10  Pain Rating Post-Intervention 1: 0/10    Objective:  Patient found seated in w/c with         Functional Status:  MDS G  Transfer Functional Status: S-SBA  Walk in Room Functional Status: S-SBA  Walk in Corridor Functional Status: S-SBA  Locomotion on Unit Functional Status: S-SBA  Locomotion Off Unit Functional Status: total(A)    MDS GG  Sit to Stand Performance: Supervision or touching assistance.  Chair/bed-to-chair transfer Performance: Supervision or touching assistance.  Chair/bed-to-chair transfer Goal: Setup or clean-up assistance  Walk 50 feet with two turns Performance: Supervision or touching assistance.  Walk 150 feet Performance: Supervision or touching assistance.     Bed Mobility:  Sit>Supine:SBA bed  Supine>Sit: NP    Transfers:  Sit<>Stand: to/from w/c w/ RW and SBA x5 trials  Stand Pivot Transfer: w/c>bed w/ RW and SBA. Pivot 180* during TUG w/ RW and SBA      Gait:  Prior to ambualting  Vital Signs:  Position: sitting in w/c prior to walking  Heart Rate: 75 bpm  Blood Pressure: 118/58 mmHg  Pulse Ox: 98 %    Trial 1: Amb 150 feet w/ RW and SBA. Moderate pace,mild SOB noted near end of gait trial  Vital Signs:  After ambulating  Position: seated in w/c after ambulating  Heart Rate: 81 bpm  Blood Pressure: 137/60 mmHg  Pulse Ox: 98  %  Trial 2: amb 150 feet w/ RW and SBA. Cues for slowing pace, energy conservation.   After walking and then sitting in w/c SpO2 = 98%  Seated rest break between trials  .  Timed Up & Go Test (TUG)  Instructions to the patient:   From sitting in a chair, stand up, walk 3 meters, turn around, walk back, and sit down  Scoring:   Assistive Device and/or Bracing Used: Rolling Walker  TUG Time: 28.1 seconds; 27.9 seconds   Community Dwelling Older Adult = > 13.5 sec. are associated with high fall risk     Advanced Gait:  Curb Step: up/down 4 inch curb step w/ RW and close SBA      Therex:  Patient pedaled LBE (lower body ergometer) x15 minutes to increase strength and endurance.    Balance:  Patient stands to remove pants w/ RW and SBA at edge of bed w/o LOB    Additional Treatment:  Patient pedaled LBE (lower body ergometer) x15 minutes to increase strength and endurance.      Patient left up in chair with call button in reach.    Assessment:  Irma Pedroza is a 89 y.o. female with a medical diagnosis of Debility.  She continues w/ fatigue and mild SOB w/ ambulation. Vital signs monitored as noted in gait section and patient remained 98% SpO2 before and after gait trials. She did have a seated rest break between each gait trial. She reduced her TUG score to 27.9 seconds. Patient will benefit from continued physical therapy to address deficits and improve safety and functional mobility. Continue with physical therapy plan of care. .    Rehab identified problem list/impairments: weakness, impaired endurance, impaired sensation, impaired functional mobilty, gait instability, impaired balance, decreased lower extremity function, pain    Rehab potential is good.    Activity tolerance: Good    Discharge recommendations: home with home health     Barriers to discharge: None    Equipment recommendations: none     GOALS:    Physical Therapy Goals        Problem: Physical Therapy Goal    Goal Priority Disciplines Outcome Goal  Variances Interventions   Physical Therapy Goal     PT/OT, PT Ongoing (interventions implemented as appropriate)     Description:  Goals to be met by: 12 days     Patient will increase functional independence with mobility by performin. Supine to sit with Modified [place]  2. Sit to supine with Modified [place]  3. Sit to stand transfer with Supervision  4. Bed to chair transfer with Supervision using Rolling Walker  5. Gait  x 150 feet with Supervision using Rolling Walker.   6. Ascend/Descend 4 inch curb step with Stand-by Assistance using Rolling Walker.= met  7. Stand for 3 minutes with Stand-by Assistance using Rolling Walker while performing dynamic standing activity  8. Lower extremity exercise program x20 reps per handout, with assistance as needed  9. Pt will improve TUG time to <30s w/ RW in order to decrease fall risk = met                        PLAN:    Patient to be seen 5 x/week  to address the above listed problems via gait training, therapeutic activities, therapeutic exercises  Plan of Care expires: 17  Plan of Care reviewed with: patient    Sabrina Ansari, PT  2017

## 2017-08-23 NOTE — NURSING
"Writer came to assessed pt expiratory wheezing still persist, RR 24- 28 bpm. 96% at 2 litters, [place] 91, temp 97.9, /68 and Blood Sugar of 202. pt was noticed to be coughing of dry non productive cough and claimed she felt "cold and clammy" . Informed charge nurse and on call NP ordered for another RT, Prednisone 40 mg tab and continuous O2.  "

## 2017-08-23 NOTE — PLAN OF CARE
Problem: Patient Care Overview  Goal: Plan of Care Review  Outcome: Ongoing (interventions implemented as appropriate)  Recommendations     Recommendation/Intervention: 1. Continue Rx diet  2. Monitor PO intake  3. RD to follow     Nutrition Goal Status: goal met  Communication of RD Recs: reviewed with RN

## 2017-08-23 NOTE — PLAN OF CARE
Problem: Physical Therapy Goal  Goal: Physical Therapy Goal  Goals to be met by: 12 days     Patient will increase functional independence with mobility by performin. Supine to sit with Modified [place]  2. Sit to supine with Modified [place]  3. Sit to stand transfer with Supervision  4. Bed to chair transfer with Supervision using Rolling Walker  5. Gait  x 150 feet with Supervision using Rolling Walker.   6. Ascend/Descend 4 inch curb step with Stand-by Assistance using Rolling Walker.= met  7. Stand for 3 minutes with Stand-by Assistance using Rolling Walker while performing dynamic standing activity  8. Lower extremity exercise program x20 reps per handout, with assistance as needed  9. Pt will improve TUG time to <30s w/ RW in order to decrease fall risk = met      Goals remain appropriate. Continue with Physical therapy Plan of Care. Sabrina Ansari, PT 2017

## 2017-08-23 NOTE — PHYSICIAN QUERY
PT Name: Irma Pedroza  MR #: [id number]     Physician Query Form - Documentation Clarification      CDS/: Martha Linares               Contact information: [email or web address]     This form is a permanent document in the medical record.     Query Date: August 23, 2017    By submitting this query, we are merely seeking further clarification of documentation. Please utilize your independent clinical judgment when addressing the question(s) below.    The Medical record reflects the following:    Supporting Clinical Findings Location in Medical Record   POCT glucose     Hemoglobin A1C 9.1    DM (diabetes mellitus)    Diabetes mellitus, type 2 POCT, Glucose 8/22    Lab 8/14    DC summary 8/17    ED provider 8/14                                                                                Doctor, Please specify diagnosis or diagnoses associated with above clinical findings.    Provider Use Only        (xx  ) Diabetes mellitus, type 2 with hyperglycemia     (  ) Diabetes mellitus, type 2 without complication                                                                                                           [  ] Clinically undetermined

## 2017-08-24 ENCOUNTER — HOSPITAL ENCOUNTER (INPATIENT)
Facility: HOSPITAL | Age: 82
LOS: 6 days | Discharge: HOME | DRG: 280 | End: 2017-08-30
Attending: EMERGENCY MEDICINE | Admitting: HOSPITALIST
Payer: MEDICARE

## 2017-08-24 VITALS
RESPIRATION RATE: 22 BRPM | WEIGHT: 119.69 LBS | SYSTOLIC BLOOD PRESSURE: 132 MMHG | BODY MASS INDEX: 22.03 KG/M2 | TEMPERATURE: 98 F | HEART RATE: 76 BPM | DIASTOLIC BLOOD PRESSURE: 62 MMHG | OXYGEN SATURATION: 93 % | HEIGHT: 62 IN

## 2017-08-24 DIAGNOSIS — I50.43 ACUTE ON CHRONIC COMBINED SYSTOLIC AND DIASTOLIC CONGESTIVE HEART FAILURE: Primary | ICD-10-CM

## 2017-08-24 DIAGNOSIS — I50.9 HEART FAILURE, UNSPECIFIED: ICD-10-CM

## 2017-08-24 DIAGNOSIS — R06.02 SOB (SHORTNESS OF BREATH): ICD-10-CM

## 2017-08-24 DIAGNOSIS — R73.9 HYPERGLYCEMIA: ICD-10-CM

## 2017-08-24 DIAGNOSIS — R31.9 URINARY TRACT INFECTION WITH HEMATURIA, SITE UNSPECIFIED: ICD-10-CM

## 2017-08-24 DIAGNOSIS — N39.0 URINARY TRACT INFECTION WITH HEMATURIA, SITE UNSPECIFIED: ICD-10-CM

## 2017-08-24 DIAGNOSIS — M62.82 NON-TRAUMATIC RHABDOMYOLYSIS: ICD-10-CM

## 2017-08-24 LAB
ALBUMIN SERPL BCP-MCNC: 3 G/DL
ALLENS TEST: ABNORMAL
ALLENS TEST: ABNORMAL
ALP SERPL-CCNC: 87 U/L
ALT SERPL W/O P-5'-P-CCNC: 39 U/L
ANION GAP SERPL CALC-SCNC: 11 MMOL/L
AST SERPL-CCNC: 26 U/L
B-OH-BUTYR BLD STRIP-SCNC: 0.2 MMOL/L
BACTERIA #/AREA URNS AUTO: NORMAL /HPF
BASOPHILS # BLD AUTO: 0.02 K/UL
BASOPHILS NFR BLD: 0.2 %
BILIRUB SERPL-MCNC: 0.4 MG/DL
BILIRUB UR QL STRIP: NEGATIVE
BNP SERPL-MCNC: 2618 PG/ML
BUN SERPL-MCNC: 37 MG/DL
CALCIUM SERPL-MCNC: 8.9 MG/DL
CHLORIDE SERPL-SCNC: 106 MMOL/L
CLARITY UR REFRACT.AUTO: CLEAR
CO2 SERPL-SCNC: 19 MMOL/L
COLOR UR AUTO: YELLOW
CREAT SERPL-MCNC: 1.3 MG/DL
DELSYS: ABNORMAL
DELSYS: ABNORMAL
DIFFERENTIAL METHOD: ABNORMAL
EOSINOPHIL # BLD AUTO: 0 K/UL
EOSINOPHIL NFR BLD: 0.1 %
EP: 5
EP: 5
ERYTHROCYTE [DISTWIDTH] IN BLOOD BY AUTOMATED COUNT: 13.4 %
EST. GFR  (AFRICAN AMERICAN): 41.4 ML/MIN/1.73 M^2
EST. GFR  (NON AFRICAN AMERICAN): 36 ML/MIN/1.73 M^2
FIO2: 50
FIO2: 60
GLUCOSE SERPL-MCNC: 559 MG/DL
GLUCOSE UR QL STRIP: ABNORMAL
HCO3 UR-SCNC: 21.3 MMOL/L (ref 24–28)
HCO3 UR-SCNC: 22.2 MMOL/L (ref 24–28)
HCT VFR BLD AUTO: 39.4 %
HGB BLD-MCNC: 13.4 G/DL
HGB UR QL STRIP: NEGATIVE
IP: 10
IP: 10
KETONES UR QL STRIP: NEGATIVE
LACTATE SERPL-SCNC: 2.8 MMOL/L
LEUKOCYTE ESTERASE UR QL STRIP: NEGATIVE
LYMPHOCYTES # BLD AUTO: 1 K/UL
LYMPHOCYTES NFR BLD: 8.1 %
MCH RBC QN AUTO: 31.1 PG
MCHC RBC AUTO-ENTMCNC: 34 G/DL
MCV RBC AUTO: 91 FL
MICROSCOPIC COMMENT: NORMAL
MODE: ABNORMAL
MODE: ABNORMAL
MONOCYTES # BLD AUTO: 1 K/UL
MONOCYTES NFR BLD: 7.5 %
NEUTROPHILS # BLD AUTO: 10.7 K/UL
NEUTROPHILS NFR BLD: 83.8 %
NITRITE UR QL STRIP: NEGATIVE
PCO2 BLDA: 44.2 MMHG (ref 35–45)
PCO2 BLDA: 44.7 MMHG (ref 35–45)
PH SMN: 7.29 [PH] (ref 7.35–7.45)
PH SMN: 7.31 [PH] (ref 7.35–7.45)
PH UR STRIP: 5 [PH] (ref 5–8)
PLATELET # BLD AUTO: 327 K/UL
PMV BLD AUTO: 11.1 FL
PO2 BLDA: 112 MMHG (ref 80–100)
PO2 BLDA: 42 MMHG (ref 40–60)
POC BE: -4 MMOL/L
POC BE: -5 MMOL/L
POC SATURATED O2: 72 % (ref 95–100)
POC SATURATED O2: 98 % (ref 95–100)
POC TCO2: 23 MMOL/L (ref 23–27)
POC TCO2: 24 MMOL/L (ref 24–29)
POCT GLUCOSE: 103 MG/DL (ref 70–110)
POCT GLUCOSE: 148 MG/DL (ref 70–110)
POCT GLUCOSE: 293 MG/DL (ref 70–110)
POCT GLUCOSE: 347 MG/DL (ref 70–110)
POCT GLUCOSE: 419 MG/DL (ref 70–110)
POCT GLUCOSE: 440 MG/DL (ref 70–110)
POCT GLUCOSE: 464 MG/DL (ref 70–110)
POCT GLUCOSE: 475 MG/DL (ref 70–110)
POCT GLUCOSE: 492 MG/DL (ref 70–110)
POTASSIUM SERPL-SCNC: 4.2 MMOL/L
PROCALCITONIN SERPL IA-MCNC: 0.13 NG/ML
PROT SERPL-MCNC: 6.3 G/DL
PROT UR QL STRIP: NEGATIVE
RBC # BLD AUTO: 4.31 M/UL
RBC #/AREA URNS AUTO: 1 /HPF (ref 0–4)
SAMPLE: ABNORMAL
SAMPLE: ABNORMAL
SITE: ABNORMAL
SITE: ABNORMAL
SODIUM SERPL-SCNC: 136 MMOL/L
SP GR UR STRIP: 1.02 (ref 1–1.03)
SPONT RATE: 24
SQUAMOUS #/AREA URNS AUTO: 1 /HPF
TROPONIN I SERPL DL<=0.01 NG/ML-MCNC: 0.36 NG/ML
URN SPEC COLLECT METH UR: ABNORMAL
UROBILINOGEN UR STRIP-ACNC: NEGATIVE EU/DL
WBC # BLD AUTO: 12.74 K/UL
WBC #/AREA URNS AUTO: 1 /HPF (ref 0–5)
YEAST UR QL AUTO: NORMAL

## 2017-08-24 PROCEDURE — 25000242 PHARM REV CODE 250 ALT 637 W/ HCPCS

## 2017-08-24 PROCEDURE — 99900058 HC 022 PAID UNDER SNF PPS

## 2017-08-24 PROCEDURE — 27000221 HC OXYGEN, UP TO 24 HOURS

## 2017-08-24 PROCEDURE — 96366 THER/PROPH/DIAG IV INF ADDON: CPT

## 2017-08-24 PROCEDURE — 99291 CRITICAL CARE FIRST HOUR: CPT | Mod: 25

## 2017-08-24 PROCEDURE — 27000190 HC CPAP FULL FACE MASK W/VALVE

## 2017-08-24 PROCEDURE — 51702 INSERT TEMP BLADDER CATH: CPT

## 2017-08-24 PROCEDURE — 81001 URINALYSIS AUTO W/SCOPE: CPT

## 2017-08-24 PROCEDURE — 25000242 PHARM REV CODE 250 ALT 637 W/ HCPCS: Performed by: NURSE PRACTITIONER

## 2017-08-24 PROCEDURE — 96375 TX/PRO/DX INJ NEW DRUG ADDON: CPT

## 2017-08-24 PROCEDURE — 96372 THER/PROPH/DIAG INJ SC/IM: CPT

## 2017-08-24 PROCEDURE — 99900035 HC TECH TIME PER 15 MIN (STAT)

## 2017-08-24 PROCEDURE — 82803 BLOOD GASES ANY COMBINATION: CPT

## 2017-08-24 PROCEDURE — 82010 KETONE BODYS QUAN: CPT

## 2017-08-24 PROCEDURE — 25000242 PHARM REV CODE 250 ALT 637 W/ HCPCS: Performed by: PHYSICIAN ASSISTANT

## 2017-08-24 PROCEDURE — 99223 1ST HOSP IP/OBS HIGH 75: CPT | Mod: AI,,, | Performed by: HOSPITALIST

## 2017-08-24 PROCEDURE — 18500000 HC LEAVE OF ABSENCE HOSPITAL SERVICES

## 2017-08-24 PROCEDURE — 93005 ELECTROCARDIOGRAM TRACING: CPT

## 2017-08-24 PROCEDURE — 25000003 PHARM REV CODE 250: Performed by: INTERNAL MEDICINE

## 2017-08-24 PROCEDURE — 94640 AIRWAY INHALATION TREATMENT: CPT

## 2017-08-24 PROCEDURE — 83880 ASSAY OF NATRIURETIC PEPTIDE: CPT

## 2017-08-24 PROCEDURE — 36600 WITHDRAWAL OF ARTERIAL BLOOD: CPT

## 2017-08-24 PROCEDURE — 25000003 PHARM REV CODE 250: Performed by: NURSE PRACTITIONER

## 2017-08-24 PROCEDURE — 63600175 PHARM REV CODE 636 W HCPCS: Performed by: PHYSICIAN ASSISTANT

## 2017-08-24 PROCEDURE — 25000003 PHARM REV CODE 250: Performed by: HOSPITALIST

## 2017-08-24 PROCEDURE — 80053 COMPREHEN METABOLIC PANEL: CPT

## 2017-08-24 PROCEDURE — 11000001 HC ACUTE MED/SURG PRIVATE ROOM

## 2017-08-24 PROCEDURE — 94660 CPAP INITIATION&MGMT: CPT

## 2017-08-24 PROCEDURE — 85025 COMPLETE CBC W/AUTO DIFF WBC: CPT

## 2017-08-24 PROCEDURE — 84145 PROCALCITONIN (PCT): CPT

## 2017-08-24 PROCEDURE — 93010 ELECTROCARDIOGRAM REPORT: CPT | Mod: 76,,, | Performed by: INTERNAL MEDICINE

## 2017-08-24 PROCEDURE — 96365 THER/PROPH/DIAG IV INF INIT: CPT

## 2017-08-24 PROCEDURE — 82962 GLUCOSE BLOOD TEST: CPT

## 2017-08-24 PROCEDURE — 96376 TX/PRO/DX INJ SAME DRUG ADON: CPT

## 2017-08-24 PROCEDURE — 63600175 PHARM REV CODE 636 W HCPCS: Performed by: HOSPITALIST

## 2017-08-24 PROCEDURE — 99291 CRITICAL CARE FIRST HOUR: CPT | Mod: ,,, | Performed by: EMERGENCY MEDICINE

## 2017-08-24 PROCEDURE — 83605 ASSAY OF LACTIC ACID: CPT

## 2017-08-24 PROCEDURE — 93010 ELECTROCARDIOGRAM REPORT: CPT | Mod: ,,, | Performed by: INTERNAL MEDICINE

## 2017-08-24 PROCEDURE — 99316 NF DSCHRG MGMT 30 MIN+: CPT | Mod: ,,, | Performed by: HOSPITALIST

## 2017-08-24 PROCEDURE — 12000002 HC ACUTE/MED SURGE SEMI-PRIVATE ROOM

## 2017-08-24 PROCEDURE — 84484 ASSAY OF TROPONIN QUANT: CPT

## 2017-08-24 RX ORDER — FUROSEMIDE 10 MG/ML
60 INJECTION INTRAMUSCULAR; INTRAVENOUS 2 TIMES DAILY
Status: DISCONTINUED | OUTPATIENT
Start: 2017-08-24 | End: 2017-08-26

## 2017-08-24 RX ORDER — SODIUM CHLORIDE 9 MG/ML
INJECTION, SOLUTION INTRAVENOUS CONTINUOUS
Status: DISCONTINUED | OUTPATIENT
Start: 2017-08-24 | End: 2017-08-24

## 2017-08-24 RX ORDER — IPRATROPIUM BROMIDE AND ALBUTEROL SULFATE 2.5; .5 MG/3ML; MG/3ML
3 SOLUTION RESPIRATORY (INHALATION)
Status: COMPLETED | OUTPATIENT
Start: 2017-08-24 | End: 2017-08-24

## 2017-08-24 RX ORDER — INSULIN ASPART 100 [IU]/ML
5 INJECTION, SOLUTION INTRAVENOUS; SUBCUTANEOUS
Status: DISCONTINUED | OUTPATIENT
Start: 2017-08-24 | End: 2017-08-24

## 2017-08-24 RX ORDER — LOSARTAN POTASSIUM 25 MG/1
25 TABLET ORAL DAILY
Status: DISCONTINUED | OUTPATIENT
Start: 2017-08-24 | End: 2017-08-30 | Stop reason: HOSPADM

## 2017-08-24 RX ORDER — ASPIRIN 81 MG/1
81 TABLET ORAL DAILY
Status: DISCONTINUED | OUTPATIENT
Start: 2017-08-24 | End: 2017-08-30 | Stop reason: HOSPADM

## 2017-08-24 RX ORDER — CLOPIDOGREL BISULFATE 75 MG/1
75 TABLET ORAL DAILY
Status: DISCONTINUED | OUTPATIENT
Start: 2017-08-24 | End: 2017-08-30 | Stop reason: HOSPADM

## 2017-08-24 RX ORDER — MIRTAZAPINE 7.5 MG/1
15 TABLET, FILM COATED ORAL NIGHTLY
Status: DISCONTINUED | OUTPATIENT
Start: 2017-08-24 | End: 2017-08-30 | Stop reason: HOSPADM

## 2017-08-24 RX ORDER — ALPRAZOLAM 0.25 MG/1
0.25 TABLET ORAL 2 TIMES DAILY
Status: DISCONTINUED | OUTPATIENT
Start: 2017-08-24 | End: 2017-08-30 | Stop reason: HOSPADM

## 2017-08-24 RX ORDER — GLUCAGON 1 MG
1 KIT INJECTION
Status: DISCONTINUED | OUTPATIENT
Start: 2017-08-24 | End: 2017-08-30 | Stop reason: HOSPADM

## 2017-08-24 RX ORDER — IBUPROFEN 200 MG
24 TABLET ORAL
Status: DISCONTINUED | OUTPATIENT
Start: 2017-08-24 | End: 2017-08-30 | Stop reason: HOSPADM

## 2017-08-24 RX ORDER — CHOLECALCIFEROL (VITAMIN D3) 25 MCG
1000 TABLET ORAL DAILY
Status: DISCONTINUED | OUTPATIENT
Start: 2017-08-24 | End: 2017-08-30 | Stop reason: HOSPADM

## 2017-08-24 RX ORDER — FUROSEMIDE 10 MG/ML
40 INJECTION INTRAMUSCULAR; INTRAVENOUS
Status: COMPLETED | OUTPATIENT
Start: 2017-08-24 | End: 2017-08-24

## 2017-08-24 RX ORDER — ONDANSETRON 2 MG/ML
INJECTION INTRAMUSCULAR; INTRAVENOUS
Status: DISPENSED
Start: 2017-08-24 | End: 2017-08-24

## 2017-08-24 RX ORDER — ATORVASTATIN CALCIUM 20 MG/1
40 TABLET, FILM COATED ORAL DAILY
Status: DISCONTINUED | OUTPATIENT
Start: 2017-08-24 | End: 2017-08-30 | Stop reason: HOSPADM

## 2017-08-24 RX ORDER — ONDANSETRON 2 MG/ML
4 INJECTION INTRAMUSCULAR; INTRAVENOUS
Status: COMPLETED | OUTPATIENT
Start: 2017-08-24 | End: 2017-08-24

## 2017-08-24 RX ORDER — IBUPROFEN 200 MG
16 TABLET ORAL
Status: DISCONTINUED | OUTPATIENT
Start: 2017-08-24 | End: 2017-08-30 | Stop reason: HOSPADM

## 2017-08-24 RX ORDER — LEVOTHYROXINE SODIUM 75 UG/1
75 TABLET ORAL EVERY MORNING
Status: DISCONTINUED | OUTPATIENT
Start: 2017-08-24 | End: 2017-08-30 | Stop reason: HOSPADM

## 2017-08-24 RX ORDER — IPRATROPIUM BROMIDE AND ALBUTEROL SULFATE 2.5; .5 MG/3ML; MG/3ML
SOLUTION RESPIRATORY (INHALATION)
Status: COMPLETED
Start: 2017-08-24 | End: 2017-08-24

## 2017-08-24 RX ORDER — FUROSEMIDE 10 MG/ML
80 INJECTION INTRAMUSCULAR; INTRAVENOUS ONCE
Status: COMPLETED | OUTPATIENT
Start: 2017-08-24 | End: 2017-08-24

## 2017-08-24 RX ORDER — INSULIN ASPART 100 [IU]/ML
0-5 INJECTION, SOLUTION INTRAVENOUS; SUBCUTANEOUS
Status: DISCONTINUED | OUTPATIENT
Start: 2017-08-24 | End: 2017-08-30 | Stop reason: HOSPADM

## 2017-08-24 RX ORDER — INSULIN ASPART 100 [IU]/ML
5 INJECTION, SOLUTION INTRAVENOUS; SUBCUTANEOUS
Status: DISCONTINUED | OUTPATIENT
Start: 2017-08-25 | End: 2017-08-25

## 2017-08-24 RX ADMIN — ALPRAZOLAM 0.25 MG: 0.25 TABLET ORAL at 01:08

## 2017-08-24 RX ADMIN — INSULIN HUMAN 6 UNITS: 100 INJECTION, SOLUTION PARENTERAL at 07:08

## 2017-08-24 RX ADMIN — INSULIN ASPART 5 UNITS: 100 INJECTION, SOLUTION INTRAVENOUS; SUBCUTANEOUS at 01:08

## 2017-08-24 RX ADMIN — THERA TABS 1 TABLET: TAB at 01:08

## 2017-08-24 RX ADMIN — IPRATROPIUM BROMIDE AND ALBUTEROL SULFATE 3 ML: .5; 3 SOLUTION RESPIRATORY (INHALATION) at 01:08

## 2017-08-24 RX ADMIN — INSULIN ASPART 5 UNITS: 100 INJECTION, SOLUTION INTRAVENOUS; SUBCUTANEOUS at 02:08

## 2017-08-24 RX ADMIN — INSULIN DETEMIR 35 UNITS: 100 INJECTION, SOLUTION SUBCUTANEOUS at 01:08

## 2017-08-24 RX ADMIN — FUROSEMIDE 40 MG: 10 INJECTION, SOLUTION INTRAVENOUS at 07:08

## 2017-08-24 RX ADMIN — ONDANSETRON 4 MG: 2 INJECTION INTRAMUSCULAR; INTRAVENOUS at 06:08

## 2017-08-24 RX ADMIN — FUROSEMIDE 60 MG: 10 INJECTION, SOLUTION INTRAVENOUS at 06:08

## 2017-08-24 RX ADMIN — IPRATROPIUM BROMIDE AND ALBUTEROL SULFATE 3 ML: .5; 3 SOLUTION RESPIRATORY (INHALATION) at 05:08

## 2017-08-24 RX ADMIN — ONDANSETRON 4 MG: 4 TABLET, ORALLY DISINTEGRATING ORAL at 01:08

## 2017-08-24 RX ADMIN — FUROSEMIDE 80 MG: 10 INJECTION, SOLUTION INTRAVENOUS at 10:08

## 2017-08-24 RX ADMIN — ASPIRIN 81 MG: 81 TABLET, COATED ORAL at 01:08

## 2017-08-24 RX ADMIN — CLOPIDOGREL 75 MG: 75 TABLET, FILM COATED ORAL at 01:08

## 2017-08-24 RX ADMIN — LEVOTHYROXINE SODIUM 75 MCG: 75 TABLET ORAL at 01:08

## 2017-08-24 RX ADMIN — ALPRAZOLAM 0.25 MG: 0.25 TABLET ORAL at 08:08

## 2017-08-24 RX ADMIN — ATORVASTATIN CALCIUM 40 MG: 20 TABLET, FILM COATED ORAL at 01:08

## 2017-08-24 RX ADMIN — METOCLOPRAMIDE HYDROCHLORIDE 5 MG: 5 SOLUTION ORAL at 03:08

## 2017-08-24 RX ADMIN — MIRTAZAPINE 15 MG: 7.5 TABLET ORAL at 08:08

## 2017-08-24 RX ADMIN — SODIUM CHLORIDE 2 UNITS/HR: 9 INJECTION, SOLUTION INTRAVENOUS at 04:08

## 2017-08-24 RX ADMIN — LOSARTAN POTASSIUM 25 MG: 25 TABLET, FILM COATED ORAL at 01:08

## 2017-08-24 RX ADMIN — Medication 1000 UNITS: at 12:08

## 2017-08-24 NOTE — HPI
89 y.o. female with a history significant for CAD with recent STEMI on 8/14, no revascularization as patient is DNR and did not want invasive procedures.  She was noted to have depressed ejection fraction to 25%, was treated medically and discharged to skilled nursing.  She had dyspnea on exertion at discharge which has been progressive, orthopnea requiring elevation at night, and the last 2 nights with shortness of breath.  Tonight, she progressively became extremely short of breath and presented to ER, requiring NIPPV to maintain sats >90%.  She denies chest pain, but complains of dyspnea as above, along with some nausea at home. She is a non-smoker, but is a brittle diabetic. Denies any fever, chills, sick contacts in the nursing home. Started on BIPAP by the ER and weaned off to nasal canula after Lasix dose administered. Patient is DNR/DNI and does not want any heroic measures or invasive procedures.   ?

## 2017-08-24 NOTE — SUBJECTIVE & OBJECTIVE
Past Medical History:   Diagnosis Date    Anxiety     Depression     Diabetes mellitus, type 2     Hyperlipidemia     Thyroid disease        Past Surgical History:   Procedure Laterality Date    BLADDER SUSPENSION      CATARACT EXTRACTION      HYSTERECTOMY      ORIF CONGENITAL HIP DISLOCATION         Review of patient's allergies indicates:  No Known Allergies    Current Facility-Administered Medications on File Prior to Encounter   Medication    [MAR Hold - Suspended Admission] acetaminophen tablet 650 mg    [MAR Hold - Suspended Admission] albuterol-ipratropium 2.5mg-0.5mg/3mL nebulizer solution 3 mL    [MAR Hold - Suspended Admission] aluminum-magnesium hydroxide-simethicone 200-200-20 mg/5 mL suspension 15 mL    [MAR Hold - Suspended Admission] aspirin EC tablet 81 mg    [MAR Hold - Suspended Admission] atorvastatin tablet 40 mg    [MAR Hold - Suspended Admission] clopidogrel tablet 75 mg    [MAR Hold - Suspended Admission] dextrose 50% injection 12.5 g    [MAR Hold - Suspended Admission] dextrose 50% injection 25 g    [MAR Hold - Suspended Admission] enoxaparin injection 30 mg    [MAR Hold - Suspended Admission] furosemide tablet 20 mg    [MAR Hold - Suspended Admission] glucagon (human recombinant) injection 1 mg    [MAR Hold - Suspended Admission] glucose chewable tablet 16 g    [MAR Hold - Suspended Admission] glucose chewable tablet 24 g    [MAR Hold - Suspended Admission] insulin aspart pen 0-5 Units    [MAR Hold - Suspended Admission] insulin detemir pen 27 Units    [MAR Hold - Suspended Admission] levothyroxine tablet 75 mcg    [MAR Hold - Suspended Admission] linagliptin (TRADJENTA) oral tablet 5 mg (pt's own supply)    [MAR Hold - Suspended Admission] metoclopramide HCl 5 mg/5 mL solution 5 mg    [MAR Hold - Suspended Admission] metoprolol succinate (TOPROL-XL) 24 hr split tablet 12.5 mg    [MAR Hold - Suspended Admission] ondansetron disintegrating tablet 4 mg    [MAR  Hold - Suspended Admission] pantoprazole EC tablet 40 mg    [MAR Hold - Suspended Admission] polyethylene glycol packet 17 g    [MAR Hold - Suspended Admission] polyethylene glycol packet 17 g    [MAR Hold - Suspended Admission] ramelteon tablet 8 mg    [MAR Hold - Suspended Admission] senna-docusate 8.6-50 mg per tablet 1 tablet     Current Outpatient Prescriptions on File Prior to Encounter   Medication Sig    alprazolam (XANAX) 0.25 MG tablet TAKE 1 TABLET BY MOUTH TWICE DAILY AS NEEDED FOR ANXIETY    aspirin (ECOTRIN) 81 MG EC tablet Take 1 tablet (81 mg total) by mouth once daily.    atorvastatin (LIPITOR) 40 MG tablet Take 1 tablet (40 mg total) by mouth once daily.    clopidogrel (PLAVIX) 75 mg tablet Take 1 tablet (75 mg total) by mouth once daily.    CONTOUR TEST STRIPS Strp USE TO TEST THREE TIMES DAILY    LANTUS SOLOSTAR 100 unit/mL (3 mL) InPn pen INJECT 30 UNITS EVERY MORNING    levothyroxine (SYNTHROID) 75 MCG tablet TAKE 1 TABLET BY MOUTH EVERY MORNING    lorazepam (ATIVAN) 1 MG tablet TAKE 1 TO 2 TABLETS BY MOUTH EVERY NIGHT AT BEDTIME    losartan (COZAAR) 50 MG tablet TAKE 1/2 TO 1 TABLET BY MOUTH EVERY DAY (Patient taking differently: TAKE 1 TABLET BY MOUTH EVERY DAY)    metoprolol succinate (TOPROL-XL) 25 MG 24 hr tablet Take 0.5 tablets (12.5 mg total) by mouth once daily.    mirtazapine (REMERON) 15 MG tablet Take 15 mg by mouth every evening.    multivitamin capsule Take 1 capsule by mouth once daily.    TRADJENTA 5 mg Tab tablet TAKE 1 TABLET BY MOUTH DAILY    vitamin D 1000 units Tab Take 2,000 Units by mouth once daily.      Family History     Problem Relation (Age of Onset)    Alzheimer's disease Mother    Cancer Father    Heart disease Mother        Social History Main Topics    Smoking status: Never Smoker    Smokeless tobacco: Not on file    Alcohol use No    Drug use: Unknown    Sexual activity: Not on file     Review of Systems   Constitutional: Negative for  appetite change, chills and fatigue.   HENT: Negative for trouble swallowing.    Respiratory: Positive for cough and shortness of breath. Negative for chest tightness and wheezing.    Cardiovascular: Positive for leg swelling. Negative for chest pain and palpitations.   Gastrointestinal: Negative for abdominal pain, constipation, diarrhea and nausea.   Genitourinary: Negative for difficulty urinating, frequency and urgency.   Musculoskeletal: Negative for arthralgias and myalgias.   Skin: Negative for rash.   Neurological: Negative for dizziness, weakness, light-headedness and headaches.   Psychiatric/Behavioral: Negative for sleep disturbance.     Objective:     Vital Signs (Most Recent):  Temp: 97.8 °F (36.6 °C) (08/24/17 1034)  Pulse: 100 (08/24/17 1436)  Resp: (!) 32 (08/24/17 1436)  BP: (!) 125/59 (08/24/17 1436)  SpO2: 95 % (08/24/17 1436) Vital Signs (24h Range):  Temp:  [97.8 °F (36.6 °C)] 97.8 °F (36.6 °C)  Pulse:  [] 100  Resp:  [18-38] 32  SpO2:  [93 %-99 %] 95 %  BP: (100-154)/(53-82) 125/59     Weight: 54 kg (119 lb)  Body mass index is 19.21 kg/m².    Physical Exam   Constitutional: She is oriented to person, place, and time. She appears well-developed and well-nourished.   HENT:   Head: Normocephalic and atraumatic.   Eyes: EOM are normal. Pupils are equal, round, and reactive to light.   Neck: Normal range of motion. Neck supple.   Cardiovascular: Normal rate and regular rhythm.  Exam reveals gallop and S3.    Pulmonary/Chest: Effort normal. She has wheezes. She has rales.   Abdominal: Soft. Bowel sounds are normal.   Musculoskeletal: Normal range of motion.   Neurological: She is alert and oriented to person, place, and time.        Significant Labs:   CBC:   Recent Labs  Lab 08/24/17  0536   WBC 12.74*   HGB 13.4   HCT 39.4        CMP:   Recent Labs  Lab 08/24/17  0536      K 4.2      CO2 19*   *   BUN 37*   CREATININE 1.3   CALCIUM 8.9   PROT 6.3   ALBUMIN 3.0*    BILITOT 0.4   ALKPHOS 87   AST 26   ALT 39   ANIONGAP 11   EGFRNONAA 36.0*     Coagulation: No results for input(s): INR, APTT in the last 48 hours.    Invalid input(s): PT  Magnesium: No results for input(s): MG in the last 48 hours.    Significant Imaging: I have reviewed and interpreted all pertinent imaging results/findings within the past 24 hours.

## 2017-08-24 NOTE — PROGRESS NOTES
Pt denied any CP previously now c/o dull ache in mid sternal area.Nausea again given 5mg Reglan per prn order.Sats 92% HR92 pt diaphoretic resp 22

## 2017-08-24 NOTE — PROGRESS NOTES
Pt c/o sob v/s taken 149/69;hr:94 po2 on 2L nc 89 % resp 24 T.98.0 increased o2 to 3L sats 92% wheezing crackles noted through out  Diminished inLLL.reported to charge

## 2017-08-24 NOTE — PROGRESS NOTES
Patient admitted from [facility]. Per PT/OT, 24/7 assist when returning home, rehab at SNF at discharge. Not a candidate for [facility].    Removed from hf list.

## 2017-08-24 NOTE — CONSULTS
Cardiology Initial Consult Note    8/24/2017    Reason for Consult: Shortness of breath    SUBJECTIVE  Irma Pedroza is a 89 y.o. female with a history significant for CAD with recent STEMI on 8/14, no revascularization as patient is DNR and did not want invasive procedures.  She was noted to have depressed ejection fraction to 25%, was treated medically and discharged to skilled nursing.  She had dyspnea on exertion at discharge which has been progressive, orthopnea requiring elevation at night, and the last 2 nights with shortness of breath.  Tonight, she progressively became extremely short of breath and presented to ER, requiring NIPPV to maintain sats >90%.  She denies chest pain, but complains of dyspnea as above, along with some nausea at home. She is a non-smoker, but is a brittle diabetic.   ?  Review of Systems   Constitution: Negative for chills, fever  HENT: Negative.    Eyes: Negative.    Cardiovascular: as per HPI  Respiratory: Positive for shortness of breath.  Endocrine: Negative.    Hematologic/Lymphatic: Negative.    Skin: Negative for color change and rash.   Musculoskeletal: Negative.    Gastrointestinal: Positive for nausea. Negative for abdominal pain, change in bowel habit  Genitourinary: Negative.    Neurological: Negative for dizziness, light-headedness  Psychiatric/Behavioral: Negative for altered mental status.     OBJECTIVE  Current Facility-Administered Medications   Medication Dose Route Frequency Provider Last Rate Last Dose    furosemide injection 40 mg  40 mg Intravenous ED 1 Time Ajith Figueroa PA-C        insulin regular injection 6 Units  6 Units Intravenous ED 1 Time Ajith Figueroa PA-C         Current Outpatient Prescriptions   Medication Sig Dispense Refill    alprazolam (XANAX) 0.25 MG tablet TAKE 1 TABLET BY MOUTH TWICE DAILY AS NEEDED FOR ANXIETY 60 tablet 0    aspirin (ECOTRIN) 81 MG EC tablet Take 1 tablet (81 mg total) by mouth once daily.  0    atorvastatin  (LIPITOR) 40 MG tablet Take 1 tablet (40 mg total) by mouth once daily. 30 tablet 11    clopidogrel (PLAVIX) 75 mg tablet Take 1 tablet (75 mg total) by mouth once daily. 30 tablet 11    CONTOUR TEST STRIPS Strp USE TO TEST THREE TIMES DAILY 300 strip 0    LANTUS SOLOSTAR 100 unit/mL (3 mL) InPn pen INJECT 30 UNITS EVERY MORNING 45 mL 11    levothyroxine (SYNTHROID) 75 MCG tablet TAKE 1 TABLET BY MOUTH EVERY MORNING 90 tablet 3    lorazepam (ATIVAN) 1 MG tablet TAKE 1 TO 2 TABLETS BY MOUTH EVERY NIGHT AT BEDTIME 60 tablet 3    losartan (COZAAR) 50 MG tablet TAKE 1/2 TO 1 TABLET BY MOUTH EVERY DAY (Patient taking differently: TAKE 1 TABLET BY MOUTH EVERY DAY) 90 tablet 3    metoprolol succinate (TOPROL-XL) 25 MG 24 hr tablet Take 0.5 tablets (12.5 mg total) by mouth once daily. 15 tablet 11    mirtazapine (REMERON) 15 MG tablet Take 15 mg by mouth every evening.      multivitamin capsule Take 1 capsule by mouth once daily.      TRADJENTA 5 mg Tab tablet TAKE 1 TABLET BY MOUTH DAILY 90 tablet 3    vitamin D 1000 units Tab Take 2,000 Units by mouth once daily.        Facility-Administered Medications Ordered in Other Encounters   Medication Dose Route Frequency Provider Last Rate Last Dose    [MAR Hold - Suspended Admission] acetaminophen tablet 650 mg  650 mg Oral Q6H PRN SEDRICK Sarmiento, BEATRIZ   650 mg at 08/23/17 0845    [MAR Hold - Suspended Admission] albuterol-ipratropium 2.5mg-0.5mg/3mL nebulizer solution 3 mL  3 mL Nebulization Q4H PRN Sammy Agosto NP   3 mL at 08/24/17 0101    [MAR Hold - Suspended Admission] aluminum-magnesium hydroxide-simethicone 200-200-20 mg/5 mL suspension 15 mL  15 mL Oral Q6H PRN SEDRICK Sarmiento, BEATRIZ        [MAR Hold - Suspended Admission] aspirin EC tablet 81 mg  81 mg Oral Daily Alessandra Coulter MD   81 mg at 08/23/17 0846    [MAR Hold - Suspended Admission] atorvastatin tablet 40 mg  40 mg Oral Daily Alessandra Coulter MD   40 mg at 08/23/17 0845    [MAR Hold  - Suspended Admission] clopidogrel tablet 75 mg  75 mg Oral Daily Alessandra Coulter MD   75 mg at 08/23/17 0845    [MAR Hold - Suspended Admission] dextrose 50% injection 12.5 g  12.5 g Intravenous PRN Alessandra Coulter MD        [MAR Hold - Suspended Admission] dextrose 50% injection 25 g  25 g Intravenous PRN Alessandra Coulter MD        [MAR Hold - Suspended Admission] enoxaparin injection 30 mg  30 mg Subcutaneous Daily Alessandra Coulter MD   30 mg at 08/23/17 1723    [MAR Hold - Suspended Admission] furosemide tablet 20 mg  20 mg Oral Daily Beverly Masterson NP        [MAR Hold - Suspended Admission] glucagon (human recombinant) injection 1 mg  1 mg Intramuscular PRN Alessandra Coulter MD        [MAR Hold - Suspended Admission] glucose chewable tablet 16 g  16 g Oral PRN Alessandra Coulter MD        [MAR Hold - Suspended Admission] glucose chewable tablet 24 g  24 g Oral PRN Alessandra Coulter MD        [MAR Hold - Suspended Admission] insulin aspart pen 0-5 Units  0-5 Units Subcutaneous QID (AC + HS) PRN Alessandra Coulter MD   2 Units at 08/23/17 2141    [MAR Hold - Suspended Admission] insulin detemir pen 27 Units  27 Units Subcutaneous Daily Beverly Masterson NP   27 Units at 08/23/17 0847    [MAR Hold - Suspended Admission] levothyroxine tablet 75 mcg  75 mcg Oral Before breakfast Alessandra Coulter MD   75 mcg at 08/23/17 0530    [MAR Hold - Suspended Admission] linagliptin (TRADJENTA) oral tablet 5 mg (pt's own supply)  5 mg Oral Daily Alessandra Coulter MD   5 mg at 08/23/17 0850    [MAR Hold - Suspended Admission] metoclopramide HCl 5 mg/5 mL solution 5 mg  5 mg Oral Q6H PRN SEDRICK Sarmiento, ANP   5 mg at 08/24/17 0325    [MAR Hold - Suspended Admission] metoprolol succinate (TOPROL-XL) 24 hr split tablet 12.5 mg  12.5 mg Oral Daily Alessandra Coulter MD   12.5 mg at 08/23/17 0845    [MAR Hold - Suspended Admission] ondansetron disintegrating tablet 4 mg  4 mg Oral Q6H PRN Alessandra Coulter MD   4 mg at 08/24/17  "0146    [MAR Hold - Suspended Admission] pantoprazole EC tablet 40 mg  40 mg Oral Daily Alessandra Coulter MD   40 mg at 08/23/17 0846    [MAR Hold - Suspended Admission] polyethylene glycol packet 17 g  17 g Oral BID PRN Alessandra Coulter MD        [MAR Hold - Suspended Admission] polyethylene glycol packet 17 g  17 g Oral Daily Alessandra Coulter MD   17 g at 08/21/17 0916    [MAR Hold - Suspended Admission] ramelteon tablet 8 mg  8 mg Oral Nightly PRN SEDRICK Sarmiento, ANP   8 mg at 08/23/17 2224    [MAR Hold - Suspended Admission] senna-docusate 8.6-50 mg per tablet 1 tablet  1 tablet Oral BID PRN Alessandra Coulter MD           Past Medical History:   Diagnosis Date    Anxiety     Depression     Diabetes mellitus, type 2     Hyperlipidemia     Thyroid disease        Past Surgical History:   Procedure Laterality Date    BLADDER SUSPENSION      CATARACT EXTRACTION      HYSTERECTOMY      ORIF CONGENITAL HIP DISLOCATION         Review of patient's allergies indicates:  No Known Allergies    Family History   Problem Relation Age of Onset    Heart disease Mother     Alzheimer's disease Mother     Cancer Father        Social History     Social History    Marital status:      Spouse name: N/A    Number of children: N/A    Years of education: N/A     Social History Main Topics    Smoking status: Never Smoker    Smokeless tobacco: Not on file    Alcohol use No    Drug use: Unknown    Sexual activity: Not on file     Other Topics Concern    Not on file     Social History Narrative    No narrative on file       Physical Exam  Vitals: /65   Pulse 106   Resp (!) 28   Ht 5' 2" (1.575 m)   Wt 58.1 kg (128 lb)   SpO2 (!) 94%   BMI 23.41 kg/m²    Gen: Lying in bed, BiPAP in place  Head/Eyes/Ears/Nose: NCAT, MMM   Neck: +JVD  Lung: NIPPV, bilateral wheezes and rales  Heart: Normal S1/S2, tachycardic  Abdomen: Soft, NT/ND, NABS  Extremities: No LE edema bilaterally, scattered ecchymoses, " warm  Skin: Scattered ecchymoses  Neuro: AAOx3      Recent Labs  Lab 08/21/17  0433 08/22/17  0521 08/24/17  0536     --  136   K 3.8  --  4.2   CO2 26  --  19*     --  106   BUN 23  --  37*   CREATININE 0.9  --  1.3   GLU 34*  --  559*   CALCIUM 8.7  --  8.9   ALT  --  37 39   AST  --  30 26   ALKPHOS  --  76 87   BILITOT  --  0.4 0.4   PROT  --  5.9* 6.3   ALBUMIN  --  2.6* 3.0*         Recent Labs  Lab 08/21/17  0433 08/24/17  0536   WBC 6.50 12.74*   HGB 12.7 13.4   HCT 39.1 39.4    327   MCV 94 91     No results for input(s): APTT, INR, PTT in the last 168 hours.    Recent Labs  Lab 08/24/17  0536   TROPONINI 0.356*        Results  TTE  1 - Severely depressed left ventricular systolic function (EF 25-30%).     2 - Wall motion abnormalities.     3 - Impaired LV relaxation, increased LVEDP.     4 - Pulmonary hypertension. The estimated PA systolic pressure is 51 mmHg.     5 - Moderate aortic regurgitation.     6 - Mild mitral regurgitation.     7 - Moderate left atrial enlargement.     EKG   Sinus tachycardia, anteroseptal infarct  ?  ASSESSMENT AND PLAN  89 y.o. female with history significant for CAD with recent STEMI on 8/14, no revascularization as patient is DNR and did not want invasive procedures.  She was noted to have depressed ejection fraction to 25%, was treated medically and discharged to skilled nursing.  She presents today with acute on chronic systolic and diastolic congestive heart failure.  No appreciable VSD on bedside echocardiogram, IVC with elevated RA pressure.  She has hypoxic respiratory failure requiring NIPPV and metabolic acidosis with brittle diabetes and serum bicarb 19, normal anion gap, not compensating for her acidosis from a respiratory standpoint because of pulmonary edema.  She is DNR, does not want invasive procedures.     Recommendations:   - Admit to medicine  - Diurese with 40mg IV lasix now  - IF no response within an hour to lasix, given 80mg IV  - Will  likely need bid dosing of lasix at least, goal is net negative 1-2L per day  - Wean NIPPV as tolerated for oxygen saturation >90%  - Trend trop, likely to be flat/decreasing - Do not think this is ACS  - Monitor I/O, may require Nelson  - Continue aspirin, plavix  - Hold metoprolol for now, would start gentle afterload reduction with ACE/ARB when tolerating  - Correct BG - Carefully as she is a brittle diabetic (BG 34 on 8/21)    Alexandru Fuentes MD  Cardiology Fellow

## 2017-08-24 NOTE — ED NOTES
Pt resting in bed and in no acute distress. Family at bedside. Side rails up X2. Call light within reach and instructed use.

## 2017-08-24 NOTE — PLAN OF CARE
08/24/2017  1:26 PM  Patient sent to ER early this morning for evaluation of CP and SOB. Patient admitted to [facility].    Future Appointments  Date Time Provider Department Center   9/5/2017 8:40 AM Nicho Sandra MD [facility] CARDIO [facility]        08/24/17 1326   Final Note   Assessment Type Final Discharge Note   Discharge Disposition Admitted   Hospital Follow Up  Appt(s) scheduled? Yes     Kenna Tracy RN, CM Skilled  [id number]

## 2017-08-24 NOTE — PLAN OF CARE
08/24/2017  10:51 AM    BRITTANY received phone call yesterday (8/23/17) from Ms. Lamas in admissions at [facility] in [place], [place].  Ms. Lamas stated that pt's son (Heriberto Pedroza) contacted her and would like to have pt transferred there for continued skilled nursing care.  Ms. Lamas stated that pt will most likely be able to transfer soon and easily since pt is a spouse of a  and her PCP is the medical director at [facility].  BRITTANY met with pt in room yesterday afternoon prior to pt being sent to ED to confirm this plan.  Pt confirmed plan to transfer to [facility] and is in agreement with that plan stating that she and Heriberto discussed it together and decided it would be a better option for her and Heriberto if she were in a nursing facility in [place], [place] close to home in [place].  BRITTANY remains available for further d/c planning assistance and will f/u if pt returns to [facility].    Lupillo Brandt, BRITTANY  [id number]

## 2017-08-24 NOTE — ED PROVIDER NOTES
Encounter Date: 8/24/2017       History     Chief Complaint   Patient presents with    Shortness of Breath     from skilled nursing, hx of CHF, c/o SOB, on CPAP per EMS     89-year-old female presents to the ER via EMS in respiratory distress.  Patient was admitted to [facility] a week ago with a STEMI.  This was treated with medical management since pt is DNR and did not want cath.  Echocardiogram revealed EF of 25%.  Troponin maxed at 17.  Patient was in cardiogenic shock treated with vasopressors.      At the nursing facility last night she began getting short of breath, symptoms were not improved with neb treatments or nasal cannula.  EMS was contacted and she was brought here.  CBG on EMS arrival 449.  Patient in respiratory distress, she arrived on CPAP.  I attempted to hold off on BiPAP but the patient could not tolerate.  She was put on BiPAP soon after being removed from CPAP.  She is complaining of nausea, epigastric pain, and shortness of breath.  Similar presentation to a week ago except today she is short of breath.  She was started on Lasix earlier in the night with transient improvement until this AM          Review of patient's allergies indicates:  No Known Allergies  Past Medical History:   Diagnosis Date    Anxiety     Depression     Diabetes mellitus, type 2     Hyperlipidemia     Thyroid disease      Past Surgical History:   Procedure Laterality Date    BLADDER SUSPENSION      CATARACT EXTRACTION      HYSTERECTOMY      ORIF CONGENITAL HIP DISLOCATION       Family History   Problem Relation Age of Onset    Heart disease Mother     Alzheimer's disease Mother     Cancer Father      Social History   Substance Use Topics    Smoking status: Never Smoker    Smokeless tobacco: Not on file    Alcohol use No     Review of Systems   Constitutional: Negative for fever.   HENT: Negative for sore throat.    Respiratory: Positive for cough, shortness of breath and stridor.     Cardiovascular: Positive for chest pain. Negative for palpitations and leg swelling.   Gastrointestinal: Negative for nausea.   Genitourinary: Negative for dysuria.   Musculoskeletal: Negative for back pain.   Skin: Negative for rash.   Neurological: Negative for weakness.   Hematological: Does not bruise/bleed easily.       Physical Exam     Initial Vitals [08/24/17 0512]   BP Pulse Resp Temp SpO2   (!) 154/79 100 (!) 28 -- 97 %      MAP       104         Physical Exam    Constitutional: Vital signs are normal. She appears well-developed and well-nourished.   HENT:   Head: Normocephalic and atraumatic.   Eyes: Conjunctivae are normal.   Cardiovascular: Regular rhythm. Tachycardia present.  Exam reveals no gallop and no friction rub.    No murmur heard.  Slightly tachycardic   Pulmonary/Chest: She is in respiratory distress. She has wheezes. She has rhonchi. She has rales. She exhibits no tenderness.   Patient is very tight, there is diffuse consolidation   Abdominal: Soft. Normal appearance, normal aorta and bowel sounds are normal.   Musculoskeletal: Normal range of motion.   Neurological: She is alert and oriented to person, place, and time.   Skin: Skin is warm and intact.   Psychiatric: She has a normal mood and affect. Her speech is normal and behavior is normal. Cognition and memory are normal.         ED Course   Procedures  Labs Reviewed   CBC W/ AUTO DIFFERENTIAL - Abnormal; Notable for the following:        Result Value    WBC 12.74 (*)     MCH 31.1 (*)     Gran # 10.7 (*)     Gran% 83.8 (*)     Lymph% 8.1 (*)     All other components within normal limits   COMPREHENSIVE METABOLIC PANEL - Abnormal; Notable for the following:     CO2 19 (*)     Glucose 559 (*)     BUN, Bld 37 (*)     Albumin 3.0 (*)     eGFR if  41.4 (*)     eGFR if non  36.0 (*)     All other components within normal limits   TROPONIN I - Abnormal; Notable for the following:     Troponin I 0.356 (*)      All other components within normal limits   B-TYPE NATRIURETIC PEPTIDE - Abnormal; Notable for the following:     BNP 2,618 (*)     All other components within normal limits   ISTAT PROCEDURE - Abnormal; Notable for the following:     POC PH 7.286 (*)     POC PO2 112 (*)     POC HCO3 21.3 (*)     All other components within normal limits   URINALYSIS, REFLEX TO URINE CULTURE             Medical Decision Making:   ED Management:  89-year-old female h/o HTN/DM with chest pain and shortness of breath, s/p recent STEMI managed medically, subsequent EF 25%. Started on Lasix last night due to SOB with transient improvement. Found to be with tachypnea/hypoxia by EMS, started on CPAP en route with improvement.  Likely CHF, vs recurrent ACS.     Patient had respiratory distress on arrival.  Chest x-ray looks slightly worse, BNP is elevated from yesterday.  On BiPAP, oxygen saturation good, initial pH checked on BiPAP 7.286.   CBG greater than 500, tx with IV insulin (no IVF due to CHF), normal anion gap so will hold insulin drip at this time. Trop still elevated but significantly less than during STEMI, unlikely to be ACS, no EKG changes.  Cardiology consulted.   Plan on admission for further management of congestive heart failure acute exacerbation.     Cardiology saw pt and recommended IV Lasix, repeat VBG.   Admitted to Medicine, will try to wean BiPAP. Pt is DNR              Attending Attestation:     Physician Attestation Statement for NP/PA:   I have conducted a face to face encounter with this patient in addition to the NP/PA, due to Medical Complexity    Other NP/PA Attestation Additions:      Medical Decision Making:     H/o hTN/DM, s/p recent STEMI with worsening SOB, exam c/w CHF. Last EF 25% and not discharged on diuretics since she was in cardiogenic shock. Last night worsening SOB, Lasix given with transient improvement until today. Workup c/w CHF, unlikely to be ACS; BiPAP started emergently in ED and labs  otherwise with hyperglycemia no DKA. Seen by Cards, admitted to -C with them following, given IV Lasix         Attending Critical Care:   Critical Care Times:   Direct Patient Care (initial evaluation, reassessments, and time considering the case)................................................................18 minutes.   Additional History from reviewing old medical records or taking additional history from the family, EMS, PCP, etc.......................6 minutes.   Ordering, Reviewing, and Interpreting Diagnostic Studies...............................................................................................................5 minutes.   Documentation..................................................................................................................................................................................5 minutes.   Consultation with other Physicians. .................................................................................................................................................5 minutes.   ==============================================================  · Total Critical Care Time - exclusive of procedural time: 39 minutes.  ==============================================================  Critical Care Condition: life-threatening   Critical Care Comments: Resp distress from CHF, recent STEMI, emergent BiPAP and diuresis          [unfilled]     ED Course     Clinical Impression:   The primary encounter diagnosis was Non-traumatic rhabdomyolysis. Diagnoses of SOB (shortness of breath) and Urinary tract infection with hematuria, site unspecified were also pertinent to this visit.    Disposition:   Disposition: Admitted  Condition: Stable                        Daniel June MD  08/24/17 0955

## 2017-08-24 NOTE — H&P
[facility] Hospital Medicine  History & Physical    Patient Name: Irma Pedroza  MRN: [id number]  Admission Date: 8/24/2017  Attending Physician: Darci Ha MD   Primary Care Provider: Leonidas Potts MD    [facility] Medicine Team: [facility] HOSP MED L Darci Ha MD     Patient information was obtained from patient, relative(s), past medical records and ER records.     Subjective:     Principal Problem:Acute on chronic combined systolic and diastolic congestive heart failure    Chief Complaint:   Chief Complaint   Patient presents with    Shortness of Breath     from skilled nursing, hx of CHF, c/o SOB, on CPAP per EMS        HPI: 89 y.o. female with a history significant for CAD with recent STEMI on 8/14, no revascularization as patient is DNR and did not want invasive procedures.  She was noted to have depressed ejection fraction to 25%, was treated medically and discharged to skilled nursing.  She had dyspnea on exertion at discharge which has been progressive, orthopnea requiring elevation at night, and the last 2 nights with shortness of breath.  Tonight, she progressively became extremely short of breath and presented to ER, requiring NIPPV to maintain sats >90%.  She denies chest pain, but complains of dyspnea as above, along with some nausea at home. She is a non-smoker, but is a brittle diabetic. Denies any fever, chills, sick contacts in the nursing home. Started on BIPAP by the ER and weaned off to nasal canula after Lasix dose administered. Patient is DNR/DNI and does not want any heroic measures or invasive procedures.   ?      Past Medical History:   Diagnosis Date    Anxiety     Depression     Diabetes mellitus, type 2     Hyperlipidemia     Thyroid disease        Past Surgical History:   Procedure Laterality Date    BLADDER SUSPENSION      CATARACT EXTRACTION      HYSTERECTOMY      ORIF CONGENITAL HIP DISLOCATION         Review of patient's allergies  indicates:  No Known Allergies    Current Facility-Administered Medications on File Prior to Encounter   Medication    [MAR Hold - Suspended Admission] acetaminophen tablet 650 mg    [MAR Hold - Suspended Admission] albuterol-ipratropium 2.5mg-0.5mg/3mL nebulizer solution 3 mL    [MAR Hold - Suspended Admission] aluminum-magnesium hydroxide-simethicone 200-200-20 mg/5 mL suspension 15 mL    [MAR Hold - Suspended Admission] aspirin EC tablet 81 mg    [MAR Hold - Suspended Admission] atorvastatin tablet 40 mg    [MAR Hold - Suspended Admission] clopidogrel tablet 75 mg    [MAR Hold - Suspended Admission] dextrose 50% injection 12.5 g    [MAR Hold - Suspended Admission] dextrose 50% injection 25 g    [MAR Hold - Suspended Admission] enoxaparin injection 30 mg    [MAR Hold - Suspended Admission] furosemide tablet 20 mg    [MAR Hold - Suspended Admission] glucagon (human recombinant) injection 1 mg    [MAR Hold - Suspended Admission] glucose chewable tablet 16 g    [MAR Hold - Suspended Admission] glucose chewable tablet 24 g    [MAR Hold - Suspended Admission] insulin aspart pen 0-5 Units    [MAR Hold - Suspended Admission] insulin detemir pen 27 Units    [MAR Hold - Suspended Admission] levothyroxine tablet 75 mcg    [MAR Hold - Suspended Admission] linagliptin (TRADJENTA) oral tablet 5 mg (pt's own supply)    [MAR Hold - Suspended Admission] metoclopramide HCl 5 mg/5 mL solution 5 mg    [MAR Hold - Suspended Admission] metoprolol succinate (TOPROL-XL) 24 hr split tablet 12.5 mg    [MAR Hold - Suspended Admission] ondansetron disintegrating tablet 4 mg    [MAR Hold - Suspended Admission] pantoprazole EC tablet 40 mg    [MAR Hold - Suspended Admission] polyethylene glycol packet 17 g    [MAR Hold - Suspended Admission] polyethylene glycol packet 17 g    [MAR Hold - Suspended Admission] ramelteon tablet 8 mg    [MAR Hold - Suspended Admission] senna-docusate 8.6-50 mg per tablet 1 tablet      Current Outpatient Prescriptions on File Prior to Encounter   Medication Sig    alprazolam (XANAX) 0.25 MG tablet TAKE 1 TABLET BY MOUTH TWICE DAILY AS NEEDED FOR ANXIETY    aspirin (ECOTRIN) 81 MG EC tablet Take 1 tablet (81 mg total) by mouth once daily.    atorvastatin (LIPITOR) 40 MG tablet Take 1 tablet (40 mg total) by mouth once daily.    clopidogrel (PLAVIX) 75 mg tablet Take 1 tablet (75 mg total) by mouth once daily.    CONTOUR TEST STRIPS Strp USE TO TEST THREE TIMES DAILY    LANTUS SOLOSTAR 100 unit/mL (3 mL) InPn pen INJECT 30 UNITS EVERY MORNING    levothyroxine (SYNTHROID) 75 MCG tablet TAKE 1 TABLET BY MOUTH EVERY MORNING    lorazepam (ATIVAN) 1 MG tablet TAKE 1 TO 2 TABLETS BY MOUTH EVERY NIGHT AT BEDTIME    losartan (COZAAR) 50 MG tablet TAKE 1/2 TO 1 TABLET BY MOUTH EVERY DAY (Patient taking differently: TAKE 1 TABLET BY MOUTH EVERY DAY)    metoprolol succinate (TOPROL-XL) 25 MG 24 hr tablet Take 0.5 tablets (12.5 mg total) by mouth once daily.    mirtazapine (REMERON) 15 MG tablet Take 15 mg by mouth every evening.    multivitamin capsule Take 1 capsule by mouth once daily.    TRADJENTA 5 mg Tab tablet TAKE 1 TABLET BY MOUTH DAILY    vitamin D 1000 units Tab Take 2,000 Units by mouth once daily.      Family History     Problem Relation (Age of Onset)    Alzheimer's disease Mother    Cancer Father    Heart disease Mother        Social History Main Topics    Smoking status: Never Smoker    Smokeless tobacco: Not on file    Alcohol use No    Drug use: Unknown    Sexual activity: Not on file     Review of Systems   Constitutional: Negative for appetite change, chills and fatigue.   HENT: Negative for trouble swallowing.    Respiratory: Positive for cough and shortness of breath. Negative for chest tightness and wheezing.    Cardiovascular: Positive for leg swelling. Negative for chest pain and palpitations.   Gastrointestinal: Negative for abdominal pain, constipation, diarrhea  and nausea.   Genitourinary: Negative for difficulty urinating, frequency and urgency.   Musculoskeletal: Negative for arthralgias and myalgias.   Skin: Negative for rash.   Neurological: Negative for dizziness, weakness, light-headedness and headaches.   Psychiatric/Behavioral: Negative for sleep disturbance.     Objective:     Vital Signs (Most Recent):  Temp: 97.8 °F (36.6 °C) (08/24/17 1034)  Pulse: 100 (08/24/17 1436)  Resp: (!) 32 (08/24/17 1436)  BP: (!) 125/59 (08/24/17 1436)  SpO2: 95 % (08/24/17 1436) Vital Signs (24h Range):  Temp:  [97.8 °F (36.6 °C)] 97.8 °F (36.6 °C)  Pulse:  [] 100  Resp:  [18-38] 32  SpO2:  [93 %-99 %] 95 %  BP: (100-154)/(53-82) 125/59     Weight: 54 kg (119 lb)  Body mass index is 19.21 kg/m².    Physical Exam   Constitutional: She is oriented to person, place, and time. She appears well-developed and well-nourished.   HENT:   Head: Normocephalic and atraumatic.   Eyes: EOM are normal. Pupils are equal, round, and reactive to light.   Neck: Normal range of motion. Neck supple.   Cardiovascular: Normal rate and regular rhythm.  Exam reveals gallop and S3.    Pulmonary/Chest: Effort normal. She has wheezes. She has rales.   Abdominal: Soft. Bowel sounds are normal.   Musculoskeletal: Normal range of motion.   Neurological: She is alert and oriented to person, place, and time.        Significant Labs:   CBC:   Recent Labs  Lab 08/24/17  0536   WBC 12.74*   HGB 13.4   HCT 39.4        CMP:   Recent Labs  Lab 08/24/17  0536      K 4.2      CO2 19*   *   BUN 37*   CREATININE 1.3   CALCIUM 8.9   PROT 6.3   ALBUMIN 3.0*   BILITOT 0.4   ALKPHOS 87   AST 26   ALT 39   ANIONGAP 11   EGFRNONAA 36.0*     Coagulation: No results for input(s): INR, APTT in the last 48 hours.    Invalid input(s): PT  Magnesium: No results for input(s): MG in the last 48 hours.    Significant Imaging: I have reviewed and interpreted all pertinent imaging results/findings within the  past 24 hours.    Assessment/Plan:     * Acute on chronic combined systolic and diastolic congestive heart failure    Started on Lasix IV 60 mg BID, given a total of 100mg IV in the ER  Intake and output  Daily weights   Pro BNP: 2,600  Nelson placed in the ER  Seen by Cardiology who recommended holding beta blockers and diuresing  Fluid restriction: 1500 cc daily  Cardiac monitor         DNR (do not resuscitate)    Confirmed with patient and family           ST elevation myocardial infarction (STEMI)    - Continue medical therapy with ASA 81mg daily, clopidogrel 75mg daily, atorvastatin 40mg daily  - hold beta blockers for now given acute exacerbation   - Treated medically on 08/14 at [place], no angiogram given advanced age and DNR/DNI status          DM (diabetes mellitus)     Poorly controlled and not responsive to insulin SQ or IV  Placed on Insulin drip  HgbA1c: 9.1   Endocrinology consult  Due to recent hypoglycemia and history of brittle diabetes   Can transition back to SQ insulin if BG<250 for 2 or more FS          VTE Risk Mitigation         Ordered     Medium Risk of VTE  Once      08/24/17 0550             Darci Ha MD  Department of Hospital Medicine   [facility]

## 2017-08-24 NOTE — ASSESSMENT & PLAN NOTE
Poorly controlled and not responsive to insulin SQ or IV  Placed on Insulin drip  HgbA1c: 9.1   Endocrinology consult  Due to recent hypoglycemia and history of brittle diabetes   Can transition back to SQ insulin if BG<250 for 2 or more FS

## 2017-08-24 NOTE — SIGNIFICANT EVENT
Notified by staff that her work of breathing has increased throughout the night.  Telemed utilized allowing for viewing of patient.  She appears in moderate distress.  She is tachypneic with a respiratory rate of 28-30, and use of accessory muscles in noted.  Her O2 requirements have increased from 2 L via NC to now 4 L NC with O2 saturations ranging between 88-90% despite recent breathing treatment.  Additionally she c/o mid sternal chest pressure / pain.  EKG was ordered - given her worsening respiratory status and new onset chest discomfort have request that she be sent to the ED for evaluation.

## 2017-08-24 NOTE — PROGRESS NOTES
Notified on call provider (APPLE Agosto NP) pt c/o SOB, nausea and dull aching pain to midsternal region./81, 28RR, 90% on 4L NC. Wet lung sounds w/ wheezing unrelieved by breathing tx. New order to send pt to ED for evaluation. [facility] transportation in route.

## 2017-08-24 NOTE — ASSESSMENT & PLAN NOTE
Started on Lasix IV 60 mg BID, given a total of 100mg IV in the ER  Intake and output  Daily weights   Pro BNP: 2,600  Nelson placed in the ER  Seen by Cardiology who recommended holding beta blockers and diuresing  Cardiac monitor

## 2017-08-24 NOTE — PROGRESS NOTES
Spoke with pt.mag Pedroza [phone] tro inform of [place] [facility]

## 2017-08-24 NOTE — ASSESSMENT & PLAN NOTE
- Continue medical therapy with ASA 81mg daily, clopidogrel 75mg daily, atorvastatin 40mg daily  - hold beta blockers for now given acute exacerbation   - Treated medically on 08/14 at [place], no angiogram given advanced age and DNR/DNI status

## 2017-08-24 NOTE — PROGRESS NOTES
EMTS  On unit transporting pt via stretcher to [facility] pt [id number]  At this time.

## 2017-08-25 PROBLEM — J96.01 ACUTE HYPOXEMIC RESPIRATORY FAILURE: Status: ACTIVE | Noted: 2017-08-25

## 2017-08-25 LAB
ALBUMIN SERPL BCP-MCNC: 2.9 G/DL
ALP SERPL-CCNC: 75 U/L
ALT SERPL W/O P-5'-P-CCNC: 35 U/L
ANION GAP SERPL CALC-SCNC: 8 MMOL/L
ANION GAP SERPL CALC-SCNC: 8 MMOL/L
AST SERPL-CCNC: 43 U/L
BASOPHILS # BLD AUTO: 0.01 K/UL
BASOPHILS NFR BLD: 0.1 %
BILIRUB SERPL-MCNC: 0.5 MG/DL
BILIRUB UR QL STRIP: NEGATIVE
BUN SERPL-MCNC: 35 MG/DL
BUN SERPL-MCNC: 40 MG/DL
CALCIUM SERPL-MCNC: 8.5 MG/DL
CALCIUM SERPL-MCNC: 8.7 MG/DL
CHLORIDE SERPL-SCNC: 101 MMOL/L
CHLORIDE SERPL-SCNC: 105 MMOL/L
CLARITY UR REFRACT.AUTO: CLEAR
CO2 SERPL-SCNC: 27 MMOL/L
CO2 SERPL-SCNC: 30 MMOL/L
COLOR UR AUTO: ABNORMAL
CREAT SERPL-MCNC: 1.1 MG/DL
CREAT SERPL-MCNC: 1.1 MG/DL
DIFFERENTIAL METHOD: ABNORMAL
EOSINOPHIL # BLD AUTO: 0.1 K/UL
EOSINOPHIL NFR BLD: 0.6 %
ERYTHROCYTE [DISTWIDTH] IN BLOOD BY AUTOMATED COUNT: 13.7 %
EST. GFR  (AFRICAN AMERICAN): 50.7 ML/MIN/1.73 M^2
EST. GFR  (AFRICAN AMERICAN): 50.7 ML/MIN/1.73 M^2
EST. GFR  (NON AFRICAN AMERICAN): 44 ML/MIN/1.73 M^2
EST. GFR  (NON AFRICAN AMERICAN): 44 ML/MIN/1.73 M^2
GLUCOSE SERPL-MCNC: 193 MG/DL
GLUCOSE SERPL-MCNC: 36 MG/DL
GLUCOSE UR QL STRIP: NEGATIVE
HCT VFR BLD AUTO: 36.9 %
HGB BLD-MCNC: 12.7 G/DL
HGB UR QL STRIP: NEGATIVE
KETONES UR QL STRIP: NEGATIVE
LEUKOCYTE ESTERASE UR QL STRIP: ABNORMAL
LYMPHOCYTES # BLD AUTO: 1.4 K/UL
LYMPHOCYTES NFR BLD: 12.6 %
MAGNESIUM SERPL-MCNC: 2.2 MG/DL
MCH RBC QN AUTO: 30.5 PG
MCHC RBC AUTO-ENTMCNC: 34.4 G/DL
MCV RBC AUTO: 89 FL
MICROSCOPIC COMMENT: NORMAL
MONOCYTES # BLD AUTO: 0.9 K/UL
MONOCYTES NFR BLD: 8.2 %
NEUTROPHILS # BLD AUTO: 8.5 K/UL
NEUTROPHILS NFR BLD: 78.1 %
NITRITE UR QL STRIP: NEGATIVE
PH UR STRIP: 5 [PH] (ref 5–8)
PHOSPHATE SERPL-MCNC: 4.3 MG/DL
PLATELET # BLD AUTO: 284 K/UL
PMV BLD AUTO: 11.1 FL
POCT GLUCOSE: 117 MG/DL (ref 70–110)
POCT GLUCOSE: 140 MG/DL (ref 70–110)
POCT GLUCOSE: 156 MG/DL (ref 70–110)
POCT GLUCOSE: 184 MG/DL (ref 70–110)
POCT GLUCOSE: 194 MG/DL (ref 70–110)
POCT GLUCOSE: 227 MG/DL (ref 70–110)
POCT GLUCOSE: 31 MG/DL (ref 70–110)
POCT GLUCOSE: 49 MG/DL (ref 70–110)
POCT GLUCOSE: 74 MG/DL (ref 70–110)
POCT GLUCOSE: 93 MG/DL (ref 70–110)
POTASSIUM SERPL-SCNC: 3 MMOL/L
POTASSIUM SERPL-SCNC: 3.8 MMOL/L
PROT SERPL-MCNC: 6.1 G/DL
PROT UR QL STRIP: NEGATIVE
RBC # BLD AUTO: 4.16 M/UL
RBC #/AREA URNS AUTO: 0 /HPF (ref 0–4)
SODIUM SERPL-SCNC: 139 MMOL/L
SODIUM SERPL-SCNC: 140 MMOL/L
SP GR UR STRIP: 1.01 (ref 1–1.03)
URN SPEC COLLECT METH UR: ABNORMAL
UROBILINOGEN UR STRIP-ACNC: NEGATIVE EU/DL
WBC # BLD AUTO: 10.87 K/UL
WBC #/AREA URNS AUTO: 2 /HPF (ref 0–5)

## 2017-08-25 PROCEDURE — 80048 BASIC METABOLIC PNL TOTAL CA: CPT

## 2017-08-25 PROCEDURE — 80053 COMPREHEN METABOLIC PANEL: CPT

## 2017-08-25 PROCEDURE — 87086 URINE CULTURE/COLONY COUNT: CPT

## 2017-08-25 PROCEDURE — 87088 URINE BACTERIA CULTURE: CPT

## 2017-08-25 PROCEDURE — 87077 CULTURE AEROBIC IDENTIFY: CPT

## 2017-08-25 PROCEDURE — 63600175 PHARM REV CODE 636 W HCPCS: Performed by: HOSPITALIST

## 2017-08-25 PROCEDURE — 36415 COLL VENOUS BLD VENIPUNCTURE: CPT

## 2017-08-25 PROCEDURE — 99233 SBSQ HOSP IP/OBS HIGH 50: CPT | Mod: ,,, | Performed by: HOSPITALIST

## 2017-08-25 PROCEDURE — 87186 SC STD MICRODIL/AGAR DIL: CPT

## 2017-08-25 PROCEDURE — 99222 1ST HOSP IP/OBS MODERATE 55: CPT | Mod: ,,, | Performed by: INTERNAL MEDICINE

## 2017-08-25 PROCEDURE — 25000003 PHARM REV CODE 250: Performed by: HOSPITALIST

## 2017-08-25 PROCEDURE — 83735 ASSAY OF MAGNESIUM: CPT

## 2017-08-25 PROCEDURE — 87040 BLOOD CULTURE FOR BACTERIA: CPT

## 2017-08-25 PROCEDURE — 25000003 PHARM REV CODE 250: Performed by: PHYSICIAN ASSISTANT

## 2017-08-25 PROCEDURE — 85025 COMPLETE CBC W/AUTO DIFF WBC: CPT

## 2017-08-25 PROCEDURE — 11000001 HC ACUTE MED/SURG PRIVATE ROOM

## 2017-08-25 PROCEDURE — 81001 URINALYSIS AUTO W/SCOPE: CPT

## 2017-08-25 PROCEDURE — 84100 ASSAY OF PHOSPHORUS: CPT

## 2017-08-25 RX ORDER — INSULIN ASPART 100 [IU]/ML
4 INJECTION, SOLUTION INTRAVENOUS; SUBCUTANEOUS
Status: DISCONTINUED | OUTPATIENT
Start: 2017-08-25 | End: 2017-08-25

## 2017-08-25 RX ORDER — INSULIN ASPART 100 [IU]/ML
5 INJECTION, SOLUTION INTRAVENOUS; SUBCUTANEOUS
Status: DISCONTINUED | OUTPATIENT
Start: 2017-08-25 | End: 2017-08-27

## 2017-08-25 RX ORDER — ACETAMINOPHEN 325 MG/1
650 TABLET ORAL ONCE
Status: DISCONTINUED | OUTPATIENT
Start: 2017-08-26 | End: 2017-08-30 | Stop reason: HOSPADM

## 2017-08-25 RX ORDER — POTASSIUM CHLORIDE 20 MEQ/1
40 TABLET, EXTENDED RELEASE ORAL ONCE
Status: COMPLETED | OUTPATIENT
Start: 2017-08-25 | End: 2017-08-25

## 2017-08-25 RX ORDER — DEXTROSE MONOHYDRATE 100 MG/ML
INJECTION, SOLUTION INTRAVENOUS CONTINUOUS
Status: ACTIVE | OUTPATIENT
Start: 2017-08-25 | End: 2017-08-25

## 2017-08-25 RX ADMIN — LEVOTHYROXINE SODIUM 75 MCG: 75 TABLET ORAL at 06:08

## 2017-08-25 RX ADMIN — POTASSIUM CHLORIDE 40 MEQ: 1500 TABLET, EXTENDED RELEASE ORAL at 12:08

## 2017-08-25 RX ADMIN — INSULIN ASPART 5 UNITS: 100 INJECTION, SOLUTION INTRAVENOUS; SUBCUTANEOUS at 05:08

## 2017-08-25 RX ADMIN — INSULIN DETEMIR 15 UNITS: 100 INJECTION, SOLUTION SUBCUTANEOUS at 12:08

## 2017-08-25 RX ADMIN — ASPIRIN 81 MG: 81 TABLET, COATED ORAL at 08:08

## 2017-08-25 RX ADMIN — DEXTROSE MONOHYDRATE 25 G: 25 INJECTION, SOLUTION INTRAVENOUS at 05:08

## 2017-08-25 RX ADMIN — Medication 1000 UNITS: at 08:08

## 2017-08-25 RX ADMIN — THERA TABS 1 TABLET: TAB at 08:08

## 2017-08-25 RX ADMIN — FUROSEMIDE 60 MG: 10 INJECTION, SOLUTION INTRAVENOUS at 08:08

## 2017-08-25 RX ADMIN — DEXTROSE MONOHYDRATE 25 G: 25 INJECTION, SOLUTION INTRAVENOUS at 12:08

## 2017-08-25 RX ADMIN — ALPRAZOLAM 0.25 MG: 0.25 TABLET ORAL at 09:08

## 2017-08-25 RX ADMIN — LOSARTAN POTASSIUM 25 MG: 25 TABLET, FILM COATED ORAL at 08:08

## 2017-08-25 RX ADMIN — MIRTAZAPINE 15 MG: 7.5 TABLET ORAL at 09:08

## 2017-08-25 RX ADMIN — ATORVASTATIN CALCIUM 40 MG: 20 TABLET, FILM COATED ORAL at 08:08

## 2017-08-25 RX ADMIN — CLOPIDOGREL 75 MG: 75 TABLET, FILM COATED ORAL at 08:08

## 2017-08-25 RX ADMIN — DEXTROSE: 10 SOLUTION INTRAVENOUS at 03:08

## 2017-08-25 RX ADMIN — ALPRAZOLAM 0.25 MG: 0.25 TABLET ORAL at 08:08

## 2017-08-25 RX ADMIN — INSULIN ASPART 5 UNITS: 100 INJECTION, SOLUTION INTRAVENOUS; SUBCUTANEOUS at 12:08

## 2017-08-25 RX ADMIN — FUROSEMIDE 60 MG: 10 INJECTION, SOLUTION INTRAVENOUS at 05:08

## 2017-08-25 NOTE — NURSING
Patient's son and daughter in law at bedside. Want to speak with  about signing paperwork to get patient admitted to [facility] in [place], [place]. Stated they already have a bed for her there but need to fill out paperwork with the physician.

## 2017-08-25 NOTE — SUBJECTIVE & OBJECTIVE
Interval History: Patient seen and examined at bedside, overnight had an episode of hypoglycemia which resolved after dextrose infusion.     Review of Systems   Constitutional: Negative for chills and fever.   HENT: Negative for congestion and rhinorrhea.    Eyes: Negative for discharge and redness.   Respiratory: Positive for cough and shortness of breath. Negative for chest tightness.    Cardiovascular: Positive for leg swelling. Negative for chest pain.   Gastrointestinal: Negative for abdominal pain, diarrhea, nausea and vomiting.   Endocrine: Negative for polydipsia and polyuria.   Genitourinary: Positive for frequency. Negative for urgency.   Musculoskeletal: Negative for arthralgias and myalgias.   Neurological: Positive for dizziness and weakness.   Psychiatric/Behavioral: Negative for agitation and sleep disturbance.     Objective:     Vital Signs (Most Recent):  Temp: 98.5 °F (36.9 °C) (08/25/17 0754)  Pulse: 70 (08/25/17 0754)  Resp: 20 (08/25/17 0754)  BP: (!) 107/59 (08/25/17 0754)  SpO2: (!) 92 % (08/25/17 0754) Vital Signs (24h Range):  Temp:  [98 °F (36.7 °C)-98.6 °F (37 °C)] 98.5 °F (36.9 °C)  Pulse:  [] 70  Resp:  [20-38] 20  SpO2:  [88 %-99 %] 92 %  BP: (100-125)/(52-59) 107/59     Weight: 54 kg (119 lb 0.8 oz)  Body mass index is 19.21 kg/m².    Intake/Output Summary (Last 24 hours) at 08/25/17 1053  Last data filed at 08/25/17 0608   Gross per 24 hour   Intake           485.83 ml   Output             2850 ml   Net         -2364.17 ml      Physical Exam   Constitutional: She is active and cooperative. No distress.   HENT:   Head: Normocephalic and atraumatic.   Eyes: Conjunctivae and EOM are normal.   Neck: Normal range of motion. Neck supple.   Cardiovascular: Normal rate and regular rhythm.  Exam reveals gallop (S3).    Pulmonary/Chest: No respiratory distress. She has wheezes (diffuse). She has rales (Bibasilar).   Abdominal: Soft. She exhibits no distension. There is no tenderness.  There is no guarding.   Musculoskeletal: She exhibits edema. She exhibits no tenderness.   Neurological: She is alert. No cranial nerve deficit.   Skin: Skin is warm and dry.   Psychiatric: She has a normal mood and affect. Her behavior is normal.       Significant Labs:   A1C:   Recent Labs  Lab 06/23/17  0847 08/14/17  1227   HGBA1C 10.3* 9.1*     CBC:   Recent Labs  Lab 08/24/17  0536 08/25/17  0331   WBC 12.74* 10.87   HGB 13.4 12.7   HCT 39.4 36.9*    284     CMP:   Recent Labs  Lab 08/24/17  0536 08/25/17  0331    140   K 4.2 3.0*    105   CO2 19* 27   * 36*   BUN 37* 40*   CREATININE 1.3 1.1   CALCIUM 8.9 8.7   PROT 6.3 6.1   ALBUMIN 3.0* 2.9*   BILITOT 0.4 0.5   ALKPHOS 87 75   AST 26 43*   ALT 39 35   ANIONGAP 11 8   EGFRNONAA 36.0* 44.0*     Coagulation: No results for input(s): INR, APTT in the last 48 hours.    Invalid input(s): PT  Lactic Acid:   Recent Labs  Lab 08/24/17  1307   LACTATE 2.8*       Significant Imaging: I have reviewed and interpreted all pertinent imaging results/findings within the past 24 hours.

## 2017-08-25 NOTE — NURSING
Received critical lab value of glu resulting 36. Upon verification check result was <49, asymptomatic. IM L paged, Way, NP returned page. Pt given 1 amp D50, at 15 minute check CBG result was 184. D10 IVF increased to 40mL/hr per MD orders. No complaints.     0617: love catheter discontinued without complications. Due to void: 8947

## 2017-08-25 NOTE — ASSESSMENT & PLAN NOTE
- Continue medical therapy with ASA 81mg daily, clopidogrel 75mg daily, atorvastatin 40mg daily  - hold beta blockers for now given acute exacerbation   - Treated medically on 08/14 at [place], no angiogram given advanced age and DNR/DNI status  - avoid hypoglycemia episodes

## 2017-08-25 NOTE — ASSESSMENT & PLAN NOTE
Patient with brittle diabetes and several episode of hyper and hypoglycemia.   Placed on Insulin drip, now discontinued after hypoglycemia  Patient received both SQ insulin and IV insulin leading to stacking  HgbA1c: 9.1   Endocrinology consult  Due to recent hypoglycemia and history of brittle diabetes, recommend starting Levemir 20 units AC and novolog 4 units post prandial with sliding scale

## 2017-08-25 NOTE — SUBJECTIVE & OBJECTIVE
Review of Systems   Constitutional: Negative for chills and fever.   HENT: Negative for congestion and rhinorrhea.    Eyes: Negative for discharge and redness.   Respiratory: Positive for cough and shortness of breath. Negative for chest tightness.    Cardiovascular: Positive for leg swelling. Negative for chest pain.   Gastrointestinal: Negative for abdominal pain, diarrhea, nausea and vomiting.   Endocrine: Negative for polydipsia and polyuria.   Genitourinary: Positive for frequency. Negative for urgency.   Musculoskeletal: Negative for arthralgias and myalgias.   Neurological: Positive for dizziness and weakness.   Psychiatric/Behavioral: Negative for agitation and sleep disturbance.       Current Medications and/or Treatments Impacting Glycemic Control  Immunotherapy:    Immunosuppressants     None        Steroids:   Hormones     None        Pressors:    Autonomic Drugs     None        Hyperglycemia/Diabetes Medications:   Antihyperglycemics     Start     Stop Route Frequency Ordered    08/24/17 1321  insulin aspart pen 0-5 Units      -- SubQ Before meals & nightly PRN 08/24/17 1221               PHYSICAL EXAMINATION:Vitals:    08/25/17 0754   BP: (!) 107/59   Pulse: 70   Resp: 20   Temp: 98.5 °F (36.9 °C)     Body mass index is 19.21 kg/m².    Physical Exam   Constitutional: She is active and cooperative. No distress.   HENT:   Head: Normocephalic and atraumatic.   Eyes: Conjunctivae and EOM are normal.   Neck: Normal range of motion. Neck supple.   Cardiovascular: Normal rate and regular rhythm.  Exam reveals gallop (S3).    Pulmonary/Chest: No respiratory distress. She has wheezes (diffuse). She has rales (Bibasilar).   Abdominal: Soft. She exhibits no distension. There is no tenderness. There is no guarding.   Musculoskeletal: She exhibits edema. She exhibits no tenderness.   Neurological: She is alert. No cranial nerve deficit.   Skin: Skin is warm and dry.   Psychiatric: She has a normal mood and  affect. Her behavior is normal.

## 2017-08-25 NOTE — PLAN OF CARE
BRITTANY met with Pt's son, Heriberto, who requested that SW follow up with the [facility].  SW called Ms. Lamas and discussed referral.  Ms. Adams is aware of the family interest in the home and indicated that family needed to complete the application.  Per family and Ms. Lamas request, SW sent facesheet, H&P, medication list, progress note and chest x-ray to [facility].      Paperwork given to SW by Pt's son and faxed documents placed in Pt's chart,    Karen Belcher LCSW

## 2017-08-25 NOTE — PLAN OF CARE
Problem: Patient Care Overview  Goal: Plan of Care Review  Outcome: Ongoing (interventions implemented as appropriate)  Patient AAOx4, VSS, weaned down to 2L NC. Breath sounds are coarse with expiratory wheezes. Patient has commode at bedside and knows to call for assistance to restroom, generalized weakness and dyspnea with exertion. Stage I pressure ulcer on coccyx, reddened and blanchable with skin at center peeling slightly. Patient denies pain, free from falls and injury. Repositions independently. WCTM.

## 2017-08-25 NOTE — ASSESSMENT & PLAN NOTE
Started on Lasix IV 60 mg BID, given a total of 100mg IV in the ER  Intake and output  Daily weights   Pro BNP: 2,600  Nelson placed in the ER, now removed  Seen by Cardiology who recommended holding beta blockers and diuresing  Cardiac monitor   Wean off oxygen

## 2017-08-25 NOTE — PLAN OF CARE
Leonidas Potts MD     Extended Emergency Contact Information  Primary Emergency Contact: Nola Pedroza   [facility]  Home Phone: [phone]  Mobile Phone: [phone]  Relation: Daughter  Secondary Emergency Contact: Heriberto Pedroza   [place]  Mobile Phone: [phone]  Relation: Son     Payor: MEDICARE / Plan: MEDICARE PART A & B / Product Type: Government /        [facility] [place] - [address] [place] [address]  [place] [address]  Phone: [phone] Fax: [phone]         08/25/17 1634   Discharge Assessment   Assessment Type Discharge Planning Assessment   Confirmed/corrected address and phone number on facesheet? Yes   Assessment information obtained from? Patient   Expected Length of Stay (days) 3   Communicated expected length of stay with patient/caregiver yes   Prior to hospitilization cognitive status: Alert/Oriented   Prior to hospitalization functional status: Assistive Equipment   Current cognitive status: Alert/Oriented   Current Functional Status: Assistive Equipment   Facility Arrived From: [facility]   Lives With child(tali), adult   Able to Return to Prior Arrangements no   Is patient able to care for self after discharge? Unable to determine at this time (comments)   Who are your caregiver(s) and their phone number(s)? Heriberto Pedroza (son)   Patient's perception of discharge disposition skilled nursing facility   Readmission Within The Last 30 Days previous discharge plan unsuccessful   If yes, most recent facility name: [facility]   Patient currently being followed by outpatient case management? No   Patient currently receives home health services? No   Patient currently receives any other outside agency services? No   Equipment Currently Used at Home bedside commode;wheelchair;walker, rolling;shower chair   Do you have any problems affording any of your prescribed medications? No   Is the  patient taking medications as prescribed? yes   Does the patient have transportation home? Yes   Transportation Available family or friend will provide   Does the patient receive services at the [facility]? No   Discharge Plan A Skilled Nursing Facility   Discharge Plan B Skilled Nursing Facility   Patient/Family In Agreement With Plan yes   Arrived From skilled nursing facility   Does the patient currently use HME? Yes   Do you have any financial concerns preventing you from receiving the healthcare you need? No   On Dialysis? No   Readmission Questionnaire   At the time of your discharge, did someone talk to you about what your health problems were? Yes   At the time of discharge, did someone talk to you about what to watch out for regarding worsening of your health problem? Yes   At the time of discharge, did someone talk to you about what to do if you experienced worsening of your health problem? Yes   At the time of discharge, did someone talk to you about which medication to take when you left the hospital and which ones to stop taking? Yes   At the time of discharge, did someone talk to you about when and where to follow up with a doctor after you left the hospital? Yes   What do you believe caused you to be sick enough to be re-admitted? heart problems   How often do you need to have someone help you when you read instructions, pamphlets, or other written material from your doctor or pharmacy? Often   Do you have problems taking your medications as prescribed? No   Do you have any problems affording any of  your prescribed medications? No   Do you have problems obtaining/receiving your medications? No   Does the patient have transportation to healthcare appointments? Yes   Does the patient have family/friends to help with healtcare needs after discharge? yes   Does your caregiver provide all the help you need? Yes   If no, what kind of help do you need at home? Heriberto [place] (son)   Are you currently  feeling confused? No   Are you currently having problems thinking? No   Are you currently having memory problems? No   Have you felt down, depressed, or hopeless? 0   Have you felt little interest or pleasure in doing things? 0   In the last 7 days, my sleep quality was: fair

## 2017-08-25 NOTE — CONSULTS
[facility]  Endocrinology  Diabetes Consult Note    Consult Requested by: Darci Ha MD   Reason for admit: Acute on chronic combined systolic and diastolic congestive heart failure    HISTORY OF PRESENT ILLNESS:  89 y/o PMH DM2, HLD, CAD s/p STEMI 08/14 (medically managed) who presented with chief complaint shortness of breath and found to be in decompensated heart failure. Initially hyperglycemic and placed on insulin gtt along with SQ and IV insulin administration before becoming hypoglycemic overnight. Endocrine consulted for blood sugar recommendations. She reported since discharge having worsening LOMAX, orthopnea and PND. Initially placed on BiPAP before being admitted. She reports no symptoms when she was hyperglycemic yesterday PM and states she was unaware until told. Normally reports sweats, chills, malaise with hypoglycemia at home.     Reason for Consult: Management of T2DM, Hyperglycemia/Hypoglycemia     Surgical Procedure and Date: n/a    Diabetes diagnosis year: per report 2011 or 2012    Home Diabetes Medications:  Lantus 30u qAM and trajenta 5mg qd (previously on invokana which was stopped due to weight loss)     How often checking glucose at home? Once every other day fasting in AM before Lantus    BG readings on regimen: Reports she is unable to remember but not abnormal to be 60-70 in AM    Hypoglycemia on the regimen?  Yes  Missed doses on regimen?  No    Diabetes Complications include:     Hyperglycemia, Hypoglycemia  and Hypoglycemia unawareness    Complicating diabetes co morbidities:   History of MI and CHF            Review of Systems   Constitutional: Negative for chills and fever.   HENT: Negative for congestion and rhinorrhea.    Eyes: Negative for discharge and redness.   Respiratory: Positive for cough and shortness of breath. Negative for chest tightness.    Cardiovascular: Positive for leg swelling. Negative for chest pain.   Gastrointestinal: Negative for  abdominal pain, diarrhea, nausea and vomiting.   Endocrine: Negative for polydipsia and polyuria.   Genitourinary: Positive for frequency. Negative for urgency.   Musculoskeletal: Negative for arthralgias and myalgias.   Neurological: Positive for dizziness and weakness.   Psychiatric/Behavioral: Negative for agitation and sleep disturbance.       Current Medications and/or Treatments Impacting Glycemic Control  Immunotherapy:    Immunosuppressants     None        Steroids:   Hormones     None        Pressors:    Autonomic Drugs     None        Hyperglycemia/Diabetes Medications:   Antihyperglycemics     Start     Stop Route Frequency Ordered    08/24/17 1321  insulin aspart pen 0-5 Units      -- SubQ Before meals & nightly PRN 08/24/17 1221               PHYSICAL EXAMINATION:Vitals:    08/25/17 0754   BP: (!) 107/59   Pulse: 70   Resp: 20   Temp: 98.5 °F (36.9 °C)     Body mass index is 19.21 kg/m².    Physical Exam   Constitutional: She is active and cooperative. No distress.   HENT:   Head: Normocephalic and atraumatic.   Eyes: Conjunctivae and EOM are normal.   Neck: Normal range of motion. Neck supple.   Cardiovascular: Normal rate and regular rhythm.  Exam reveals gallop (S3).    Pulmonary/Chest: No respiratory distress. She has wheezes (diffuse). She has rales (Bibasilar).   Abdominal: Soft. She exhibits no distension. There is no tenderness. There is no guarding.   Musculoskeletal: She exhibits edema. She exhibits no tenderness.   Neurological: She is alert. No cranial nerve deficit.   Skin: Skin is warm and dry.   Psychiatric: She has a normal mood and affect. Her behavior is normal.         Labs Reviewed and Include     Recent Labs  Lab 08/25/17  0331   GLU 36*   CALCIUM 8.7   ALBUMIN 2.9*   PROT 6.1      K 3.0*   CO2 27      BUN 40*   CREATININE 1.1   ALKPHOS 75   ALT 35   AST 43*   BILITOT 0.5     Lab Results   Component Value Date    WBC 10.87 08/25/2017    HGB 12.7 08/25/2017    HCT 36.9  (L) 08/25/2017    MCV 89 08/25/2017     08/25/2017     No results for input(s): TSH, FREET4 in the last 168 hours.  Lab Results   Component Value Date    HGBA1C 9.1 (H) 08/14/2017       Nutritional status:   Body mass index is 19.21 kg/m².  Lab Results   Component Value Date    ALBUMIN 2.9 (L) 08/25/2017    ALBUMIN 3.0 (L) 08/24/2017    ALBUMIN 2.6 (L) 08/22/2017     No results found for: PREALBUMIN    Estimated Creatinine Clearance: 28.4 mL/min (based on Cr of 1.1).    Accu-Checks  Recent Labs      08/24/17   1748  08/24/17   1921  08/24/17   2113  08/25/17   0011  08/25/17   0025  08/25/17   0039  08/25/17   0111  08/25/17   0543  08/25/17   0606  08/25/17   0933   POCTGLUCOSE  293*  148*  103  31*  227*  194*  117*  49*  184*  93        ASSESSMENT and PLAN    Uncontrolled type 2 diabetes mellitus with complication, with long-term current use of insulin    -Initial hyperglycemia then hypoglycemia  -Uncontrolled last A1c >9, given age and co-morbidities along with concerns about medication administration, goal of 8 is more appropriate  -Based on history concerned about frequent fasting hypoglycemic episodes (and unawareness) at home  -On review of SNF records most hyperglycemic episodes around 11AM-12PM soon after getting detemir dose, main problem appears to be prandial excursions, she does not regularly check at home other than fasting but has been low on this regimen; appears to have fastings at goal on levemir 20u    -Hyperglycemia likely 2/2 exogenous insulin from yesterday, on chart review received approx 59u total from IV and sub Q sources  -Last detemir dosing at 1300 on 08/24 of 35u    -Recommend starting levemir 20u qd and novolog 4u AC + sliding scale, may require uptitration on this regimen but will ideally avoid hypoglycemia now  -Another option on discharge would be NPH 14u BID if she is not likely to be able to check blood sugars regularly and would like to reduce dosing          DNR (do not  resuscitate)    -Per patient wants to avoid invasive procedures or interventions          ST elevation myocardial infarction (STEMI)    -08/14, medically managed  -Avoid hypoglycemia  -On ASA/Plavix/BB/ARB          * Acute on chronic combined systolic and diastolic congestive heart failure    -Managed per primary  -Avoid hypoglycemia  -On ARB/BB            Jayy Long IV, MD  Endocrinology  [facility] MD,  have personally taken the history and examined the patient and agree with the resident's note as stated above.    Hypoglycemia - needed treatment - I am concerned about   Would change regimen to basal 15 prandial 5 ac

## 2017-08-25 NOTE — NURSING
Pt arrived to room 1006 via stretcher and transport. During transfer to bed insulin drip was found in bed connected to pt's PIV, roller clamp unclamped, yet Alaris safety clamp closed. Nelson catheter in place. Pt extremely lethargic during assessment, SpO2 at 83%. CBG resulted <30. D50 IVP given, per MD orders. At 15 minute check CBG resulted at 227. Pt became arousable and answering questions. States being very tired. MD notified, new D10 IVF ordered. SpO2 increased to low-mid 90's. VSS.

## 2017-08-25 NOTE — HPI
89 y/o PMH DM2, HLD, CAD s/p STEMI 08/14 (medically managed) who presented with chief complaint shortness of breath and found to be in decompensated heart failure. Initially hyperglycemic and placed on insulin gtt along with SQ and IV insulin administration before becoming hypoglycemic overnight. Endocrine consulted for blood sugar recommendations. She reported since discharge having worsening LOMAX, orthopnea and PND. Initially placed on BiPAP before being admitted. She reports no symptoms when she was hyperglycemic yesterday PM and states she was unaware until told. Normally reports sweats, chills, malaise with hypoglycemia at home.     Reason for Consult: Management of T2DM, Hyperglycemia/Hypoglycemia     Surgical Procedure and Date: n/a    Diabetes diagnosis year: per report 2011 or 2012    Home Diabetes Medications:  Lantus 30u qAM and trajenta 5mg qd (previously on invokana which was stopped due to weight loss)     How often checking glucose at home? Once every other day fasting in AM before Lantus    BG readings on regimen: Reports she is unable to remember but not abnormal to be 60-70 in AM    Hypoglycemia on the regimen?  Yes  Missed doses on regimen?  No    Diabetes Complications include:     Hyperglycemia, Hypoglycemia  and Hypoglycemia unawareness    Complicating diabetes co morbidities:   History of MI and CHF

## 2017-08-25 NOTE — ASSESSMENT & PLAN NOTE
-Initial hyperglycemia then hypoglycemia  -Uncontrolled last A1c >9, given age and co-morbidities along with concerns about medication administration, goal of 8 is more appropriate  -Based on history concerned about frequent fasting hypoglycemic episodes (and unawareness) at home  -On review of SNF records most hyperglycemic episodes around 11AM-12PM soon after getting detemir dose, main problem appears to be prandial excursions, she does not regularly check at home other than fasting but has been low on this regimen; appears to have fastings at goal on levemir 20u    -Hyperglycemia likely 2/2 exogenous insulin from yesterday, on chart review received approx 59u total from IV and sub Q sources  -Last detemir dosing at 1300 on 08/24 of 35u    -Recommend starting levemir 20u qd and novolog 4u AC + sliding scale, may require uptitration on this regimen  -Another option on discharge would be NPH 14u BID if she is not likely to be able to check blood sugars regularly and would like to reduce dosing

## 2017-08-25 NOTE — PROGRESS NOTES
[facility] Hospital Medicine  Progress Note    Patient Name: Irma Pedroza  MRN: [id number]  Patient Class: IP- Inpatient   Admission Date: 8/24/2017  Length of Stay: 1 days  Attending Physician: Darci Ha MD  Primary Care Provider: Leonidas Potts MD    [facility] Medicine Team: [facility] HOSP MED L Darci Ha MD    Subjective:     Principal Problem:Acute on chronic combined systolic and diastolic congestive heart failure    HPI:  89 y.o. female with a history significant for CAD with recent STEMI on 8/14, no revascularization as patient is DNR and did not want invasive procedures.  She was noted to have depressed ejection fraction to 25%, was treated medically and discharged to skilled nursing.  She had dyspnea on exertion at discharge which has been progressive, orthopnea requiring elevation at night, and the last 2 nights with shortness of breath.  Tonight, she progressively became extremely short of breath and presented to ER, requiring NIPPV to maintain sats >90%.  She denies chest pain, but complains of dyspnea as above, along with some nausea at home. She is a non-smoker, but is a brittle diabetic. Denies any fever, chills, sick contacts in the nursing home. Started on BIPAP by the ER and weaned off to nasal canula after Lasix dose administered. Patient is DNR/DNI and does not want any heroic measures or invasive procedures.   ?      Hospital Course:  89 yo F with a PMH of recent STEMI, CHF with EF of 25%, presents with worsening shortness of breath and hypoxemia from skilled nursing facility. Started on Lasix IV pushes with yielded several fluids of urine and oxygen was changed to nasal canula. Insulin drip started due to Glucose >500 despite Iv insulin pushes, subsequently patient became hypoglycemic with no symptoms and started on Dextrose infusion which resolved hypoglycemia. Endocrinology consulted for aid in glucose management.     Interval History: Patient seen and  examined at bedside, overnight had an episode of hypoglycemia which resolved after dextrose infusion.     Review of Systems   Constitutional: Negative for chills and fever.   HENT: Negative for congestion and rhinorrhea.    Eyes: Negative for discharge and redness.   Respiratory: Positive for cough and shortness of breath. Negative for chest tightness.    Cardiovascular: Positive for leg swelling. Negative for chest pain.   Gastrointestinal: Negative for abdominal pain, diarrhea, nausea and vomiting.   Endocrine: Negative for polydipsia and polyuria.   Genitourinary: Positive for frequency. Negative for urgency.   Musculoskeletal: Negative for arthralgias and myalgias.   Neurological: Positive for dizziness and weakness.   Psychiatric/Behavioral: Negative for agitation and sleep disturbance.     Objective:     Vital Signs (Most Recent):  Temp: 98.5 °F (36.9 °C) (08/25/17 0754)  Pulse: 70 (08/25/17 0754)  Resp: 20 (08/25/17 0754)  BP: (!) 107/59 (08/25/17 0754)  SpO2: (!) 92 % (08/25/17 0754) Vital Signs (24h Range):  Temp:  [98 °F (36.7 °C)-98.6 °F (37 °C)] 98.5 °F (36.9 °C)  Pulse:  [] 70  Resp:  [20-38] 20  SpO2:  [88 %-99 %] 92 %  BP: (100-125)/(52-59) 107/59     Weight: 54 kg (119 lb 0.8 oz)  Body mass index is 19.21 kg/m².    Intake/Output Summary (Last 24 hours) at 08/25/17 1053  Last data filed at 08/25/17 0608   Gross per 24 hour   Intake           485.83 ml   Output             2850 ml   Net         -2364.17 ml      Physical Exam   Constitutional: She is active and cooperative. No distress.   HENT:   Head: Normocephalic and atraumatic.   Eyes: Conjunctivae and EOM are normal.   Neck: Normal range of motion. Neck supple.   Cardiovascular: Normal rate and regular rhythm.  Exam reveals gallop (S3).    Pulmonary/Chest: No respiratory distress. She has wheezes (diffuse). She has rales (Bibasilar).   Abdominal: Soft. She exhibits no distension. There is no tenderness. There is no guarding.    Musculoskeletal: She exhibits edema. She exhibits no tenderness.   Neurological: She is alert. No cranial nerve deficit.   Skin: Skin is warm and dry.   Psychiatric: She has a normal mood and affect. Her behavior is normal.       Significant Labs:   A1C:   Recent Labs  Lab 06/23/17  0847 08/14/17  1227   HGBA1C 10.3* 9.1*     CBC:   Recent Labs  Lab 08/24/17  0536 08/25/17  0331   WBC 12.74* 10.87   HGB 13.4 12.7   HCT 39.4 36.9*    284     CMP:   Recent Labs  Lab 08/24/17  0536 08/25/17  0331    140   K 4.2 3.0*    105   CO2 19* 27   * 36*   BUN 37* 40*   CREATININE 1.3 1.1   CALCIUM 8.9 8.7   PROT 6.3 6.1   ALBUMIN 3.0* 2.9*   BILITOT 0.4 0.5   ALKPHOS 87 75   AST 26 43*   ALT 39 35   ANIONGAP 11 8   EGFRNONAA 36.0* 44.0*     Coagulation: No results for input(s): INR, APTT in the last 48 hours.    Invalid input(s): PT  Lactic Acid:   Recent Labs  Lab 08/24/17  1307   LACTATE 2.8*       Significant Imaging: I have reviewed and interpreted all pertinent imaging results/findings within the past 24 hours.    Assessment/Plan:      * Acute on chronic combined systolic and diastolic congestive heart failure    Started on Lasix IV 60 mg BID, given a total of 100mg IV in the ER  Intake and output  Daily weights   Pro BNP: 2,600  Nelson placed in the ER, now removed  Seen by Cardiology who recommended holding beta blockers and diuresing  Cardiac monitor   Wean off oxygen        Acute hypoxemic respiratory failure    Most likely due to pulmonary edema from congestive heart failure  Placed on BIPAP on admission          DNR (do not resuscitate)    Confirmed with patient and family           ST elevation myocardial infarction (STEMI)    - Continue medical therapy with ASA 81mg daily, clopidogrel 75mg daily, atorvastatin 40mg daily  - hold beta blockers for now given acute exacerbation   - Treated medically on 08/14 at [facility], no angiogram given advanced age and DNR/DNI status  - avoid hypoglycemia  episodes          Uncontrolled type 2 diabetes mellitus with complication, with long-term current use of insulin    Patient with brittle diabetes and several episode of hyper and hypoglycemia.   Placed on Insulin drip, now discontinued after hypoglycemia  Patient received both SQ insulin and IV insulin leading to stacking  HgbA1c: 9.1   Endocrinology consult  Due to recent hypoglycemia and history of brittle diabetes, recommend starting Levemir 20 units AC and novolog 4 units post prandial with sliding scale            VTE Risk Mitigation         Ordered     Medium Risk of VTE  Once      08/24/17 0550              Darci Ha MD  Department of Hospital Medicine   [facility]

## 2017-08-25 NOTE — PLAN OF CARE
Problem: Patient Care Overview  Goal: Plan of Care Review  Outcome: Ongoing (interventions implemented as appropriate)  Pt AAOx2, intermittent disorientation to time/situation. Once CBG's and SpO2 were regulated [see previous note], VSS, alert when awakened. Denies pain/discomfort. Pt remains on 4L NC, SpO2 90's. Pt continues to refuse admit assessment questions at this time, wishing to sleep. States will answer in the morning. Pt's physical assessment documented in flowsheets. No other change overnight. Will continue to monitor.

## 2017-08-26 LAB
ALBUMIN SERPL BCP-MCNC: 2.4 G/DL
ALP SERPL-CCNC: 75 U/L
ALT SERPL W/O P-5'-P-CCNC: 31 U/L
ANION GAP SERPL CALC-SCNC: 10 MMOL/L
AST SERPL-CCNC: 37 U/L
BASOPHILS # BLD AUTO: 0.02 K/UL
BASOPHILS NFR BLD: 0.2 %
BILIRUB SERPL-MCNC: 0.6 MG/DL
BUN SERPL-MCNC: 27 MG/DL
CALCIUM SERPL-MCNC: 8.3 MG/DL
CHLORIDE SERPL-SCNC: 104 MMOL/L
CO2 SERPL-SCNC: 27 MMOL/L
CREAT SERPL-MCNC: 0.9 MG/DL
DIFFERENTIAL METHOD: ABNORMAL
EOSINOPHIL # BLD AUTO: 0.2 K/UL
EOSINOPHIL NFR BLD: 2 %
ERYTHROCYTE [DISTWIDTH] IN BLOOD BY AUTOMATED COUNT: 13.7 %
EST. GFR  (AFRICAN AMERICAN): >60 ML/MIN/1.73 M^2
EST. GFR  (NON AFRICAN AMERICAN): 56.1 ML/MIN/1.73 M^2
GLUCOSE SERPL-MCNC: 80 MG/DL
HCT VFR BLD AUTO: 37.5 %
HGB BLD-MCNC: 12.9 G/DL
LACTATE SERPL-SCNC: 1.4 MMOL/L
LYMPHOCYTES # BLD AUTO: 0.9 K/UL
LYMPHOCYTES NFR BLD: 10.5 %
MAGNESIUM SERPL-MCNC: 1.8 MG/DL
MCH RBC QN AUTO: 31 PG
MCHC RBC AUTO-ENTMCNC: 34.4 G/DL
MCV RBC AUTO: 90 FL
MONOCYTES # BLD AUTO: 0.8 K/UL
MONOCYTES NFR BLD: 9.3 %
NEUTROPHILS # BLD AUTO: 6.6 K/UL
NEUTROPHILS NFR BLD: 77.5 %
PHOSPHATE SERPL-MCNC: 2.8 MG/DL
PLATELET # BLD AUTO: 258 K/UL
PMV BLD AUTO: 11.1 FL
POCT GLUCOSE: 101 MG/DL (ref 70–110)
POCT GLUCOSE: 123 MG/DL (ref 70–110)
POCT GLUCOSE: 149 MG/DL (ref 70–110)
POCT GLUCOSE: 155 MG/DL (ref 70–110)
POCT GLUCOSE: 283 MG/DL (ref 70–110)
POTASSIUM SERPL-SCNC: 3.4 MMOL/L
PROT SERPL-MCNC: 5.6 G/DL
RBC # BLD AUTO: 4.16 M/UL
SODIUM SERPL-SCNC: 141 MMOL/L
WBC # BLD AUTO: 8.57 K/UL

## 2017-08-26 PROCEDURE — 83605 ASSAY OF LACTIC ACID: CPT

## 2017-08-26 PROCEDURE — 63600175 PHARM REV CODE 636 W HCPCS: Performed by: HOSPITALIST

## 2017-08-26 PROCEDURE — 99233 SBSQ HOSP IP/OBS HIGH 50: CPT | Mod: ,,, | Performed by: HOSPITALIST

## 2017-08-26 PROCEDURE — 86580 TB INTRADERMAL TEST: CPT | Performed by: HOSPITALIST

## 2017-08-26 PROCEDURE — 83735 ASSAY OF MAGNESIUM: CPT

## 2017-08-26 PROCEDURE — 25000003 PHARM REV CODE 250: Performed by: HOSPITALIST

## 2017-08-26 PROCEDURE — 80053 COMPREHEN METABOLIC PANEL: CPT

## 2017-08-26 PROCEDURE — 84100 ASSAY OF PHOSPHORUS: CPT

## 2017-08-26 PROCEDURE — 97165 OT EVAL LOW COMPLEX 30 MIN: CPT

## 2017-08-26 PROCEDURE — 99232 SBSQ HOSP IP/OBS MODERATE 35: CPT | Mod: ,,, | Performed by: INTERNAL MEDICINE

## 2017-08-26 PROCEDURE — 85025 COMPLETE CBC W/AUTO DIFF WBC: CPT

## 2017-08-26 PROCEDURE — 97530 THERAPEUTIC ACTIVITIES: CPT

## 2017-08-26 PROCEDURE — 11000001 HC ACUTE MED/SURG PRIVATE ROOM

## 2017-08-26 RX ORDER — POTASSIUM CHLORIDE 20 MEQ/1
40 TABLET, EXTENDED RELEASE ORAL ONCE
Status: COMPLETED | OUTPATIENT
Start: 2017-08-26 | End: 2017-08-26

## 2017-08-26 RX ORDER — FUROSEMIDE 40 MG/1
40 TABLET ORAL 2 TIMES DAILY
Status: DISCONTINUED | OUTPATIENT
Start: 2017-08-26 | End: 2017-08-30 | Stop reason: HOSPADM

## 2017-08-26 RX ORDER — POLYETHYLENE GLYCOL 3350 17 G/17G
17 POWDER, FOR SOLUTION ORAL 2 TIMES DAILY PRN
Status: DISCONTINUED | OUTPATIENT
Start: 2017-08-26 | End: 2017-08-30 | Stop reason: HOSPADM

## 2017-08-26 RX ADMIN — POTASSIUM CHLORIDE 40 MEQ: 1500 TABLET, EXTENDED RELEASE ORAL at 11:08

## 2017-08-26 RX ADMIN — FUROSEMIDE 40 MG: 40 TABLET ORAL at 05:08

## 2017-08-26 RX ADMIN — INSULIN ASPART 5 UNITS: 100 INJECTION, SOLUTION INTRAVENOUS; SUBCUTANEOUS at 12:08

## 2017-08-26 RX ADMIN — ATORVASTATIN CALCIUM 40 MG: 20 TABLET, FILM COATED ORAL at 08:08

## 2017-08-26 RX ADMIN — LEVOTHYROXINE SODIUM 75 MCG: 75 TABLET ORAL at 06:08

## 2017-08-26 RX ADMIN — INSULIN ASPART 1 UNITS: 100 INJECTION, SOLUTION INTRAVENOUS; SUBCUTANEOUS at 10:08

## 2017-08-26 RX ADMIN — Medication 5 UNITS: at 11:08

## 2017-08-26 RX ADMIN — THERA TABS 1 TABLET: TAB at 08:08

## 2017-08-26 RX ADMIN — INSULIN ASPART 5 UNITS: 100 INJECTION, SOLUTION INTRAVENOUS; SUBCUTANEOUS at 05:08

## 2017-08-26 RX ADMIN — INSULIN DETEMIR 15 UNITS: 100 INJECTION, SOLUTION SUBCUTANEOUS at 09:08

## 2017-08-26 RX ADMIN — ALPRAZOLAM 0.25 MG: 0.25 TABLET ORAL at 10:08

## 2017-08-26 RX ADMIN — LOSARTAN POTASSIUM 25 MG: 25 TABLET, FILM COATED ORAL at 08:08

## 2017-08-26 RX ADMIN — CLOPIDOGREL 75 MG: 75 TABLET, FILM COATED ORAL at 08:08

## 2017-08-26 RX ADMIN — Medication 1000 UNITS: at 08:08

## 2017-08-26 RX ADMIN — POLYETHYLENE GLYCOL 3350 17 G: 17 POWDER, FOR SOLUTION ORAL at 12:08

## 2017-08-26 RX ADMIN — INSULIN ASPART 5 UNITS: 100 INJECTION, SOLUTION INTRAVENOUS; SUBCUTANEOUS at 09:08

## 2017-08-26 RX ADMIN — ALPRAZOLAM 0.25 MG: 0.25 TABLET ORAL at 08:08

## 2017-08-26 RX ADMIN — MIRTAZAPINE 15 MG: 7.5 TABLET ORAL at 10:08

## 2017-08-26 RX ADMIN — ASPIRIN 81 MG: 81 TABLET, COATED ORAL at 08:08

## 2017-08-26 NOTE — PROGRESS NOTES
"[facility]  Endocrinology  Progress Note    Admit Date: 2017     89 y/o PMH DM2, HLD, CAD s/p STEMI  (medically managed) who presented with chief complaint shortness of breath and found to be in decompensated heart failure. Initially hyperglycemic and placed on insulin gtt along with SQ and IV insulin administration before becoming hypoglycemic overnight. Endocrine consulted for blood sugar recommendations. She reported since discharge having worsening LOMAX, orthopnea and PND. Initially placed on BiPAP before being admitted. She reports no symptoms when she was hyperglycemic yesterday PM and states she was unaware until told. Normally reports sweats, chills, malaise with hypoglycemia at home.     Reason for Consult: Management of T2DM, Hyperglycemia/Hypoglycemia     Surgical Procedure and Date: n/a    Diabetes diagnosis year: per report  or     Home Diabetes Medications:  Lantus 30u qAM and trajenta 5mg qd (previously on invokana which was stopped due to weight loss)     How often checking glucose at home? Once every other day fasting in AM before Lantus    BG readings on regimen: Reports she is unable to remember but not abnormal to be 60-70 in AM    Hypoglycemia on the regimen?  Yes  Missed doses on regimen?  No    Diabetes Complications include:     Hyperglycemia, Hypoglycemia  and Hypoglycemia unawareness    Complicating diabetes co morbidities:   History of MI and CHF        Interval HPI:   Overnight events: Breathing better. Denies nausea.  Eatin%  Nausea: No  Hypoglycemia and intervention: No  Fever: No    BP (!) 116/55 (BP Location: Right arm, Patient Position: Lying)   Pulse 83   Temp 98.3 °F (36.8 °C) (Oral)   Resp 16   Ht 5' 6" (1.676 m)   Wt 55.9 kg (123 lb 3.8 oz)   SpO2 (!) 94%   Breastfeeding? No   BMI 19.89 kg/m²       Labs Reviewed and Include      Recent Labs  Lab 17  0457   GLU 80   CALCIUM 8.3*   ALBUMIN 2.4*   PROT 5.6*      K 3.4* "   CO2 27      BUN 27   CREATININE 0.9   ALKPHOS 75   ALT 31   AST 37   BILITOT 0.6     Lab Results   Component Value Date    WBC 8.57 08/26/2017    HGB 12.9 08/26/2017    HCT 37.5 08/26/2017    MCV 90 08/26/2017     08/26/2017     No results for input(s): TSH, FREET4 in the last 168 hours.  Lab Results   Component Value Date    HGBA1C 9.1 (H) 08/14/2017       Nutritional status:   Body mass index is 19.89 kg/m².  Lab Results   Component Value Date    ALBUMIN 2.4 (L) 08/26/2017    ALBUMIN 2.9 (L) 08/25/2017    ALBUMIN 3.0 (L) 08/24/2017     No results found for: PREALBUMIN    Estimated Creatinine Clearance: 35.9 mL/min (based on Cr of 0.9).    Accu-Checks  Recent Labs      08/25/17   0039  08/25/17   0111  08/25/17   0543  08/25/17   0606  08/25/17   0933  08/25/17   1256  08/25/17   1732  08/25/17   2131  08/26/17   0604  08/26/17   0857   POCTGLUCOSE  194*  117*  49*  184*  93  140*  156*  74  101  155*       Current Medications and/or Treatments Impacting Glycemic Control  Immunotherapy:  Immunosuppressants     None        Steroids:   Hormones     None        Pressors:    Autonomic Drugs     None        Hyperglycemia/Diabetes Medications: Antihyperglycemics     Start     Stop Route Frequency Ordered    08/25/17 1300  insulin detemir pen 15 Units      -- SubQ Daily 08/25/17 1134    08/25/17 1215  insulin aspart pen 5 Units      -- SubQ 3 times daily with meals 08/25/17 1134    08/24/17 1321  insulin aspart pen 0-5 Units      -- SubQ Before meals & nightly PRN 08/24/17 1221          ASSESSMENT and PLAN    DNR (do not resuscitate)    Per patient wants to avoid invasive procedures or interventions  Will relax A1c goals.        ST elevation myocardial infarction (STEMI)    08/14, medically managed  Avoid hypoglycemia        Uncontrolled type 2 diabetes mellitus with complication, with long-term current use of insulin    BG better today on 15-5-5-5.  Will relax A1c goal to ~8 given comorbid conditions and  limited life-expectancy. Will not shoot for tight control at the expense of low glucose.  Continue current regimen for now and will make adjustments based on POC checks.    Discharge planning: TBD.          * Acute on chronic combined systolic and diastolic congestive heart failure    Managed per primary  Avoid hypoglycemia  On ARB/BB              Seng Calvillo MD  Endocrinology  [facility], Tanja Neal MD,  have personally taken the history and examined the patient and agree with the resident's note as stated above.

## 2017-08-26 NOTE — SUBJECTIVE & OBJECTIVE
Interval History: Patient seen and examined at bedside, no acute events overnight. Patient complains of constipation. Miralax ordered. Oxygen weaned to 1 liter today.     Review of Systems   Constitutional: Negative for chills and fever.   HENT: Negative for congestion and rhinorrhea.    Eyes: Negative for discharge and redness.   Respiratory: Positive for shortness of breath. Negative for cough and chest tightness.    Cardiovascular: Negative for chest pain and leg swelling.   Gastrointestinal: Negative for abdominal pain, diarrhea, nausea and vomiting.   Endocrine: Negative for polydipsia and polyuria.   Genitourinary: Positive for frequency. Negative for urgency.   Musculoskeletal: Negative for arthralgias and myalgias.   Neurological: Positive for dizziness. Negative for weakness.   Psychiatric/Behavioral: Negative for agitation and sleep disturbance.     Objective:     Vital Signs (Most Recent):  Temp: 98.3 °F (36.8 °C) (08/26/17 0735)  Pulse: 87 (08/26/17 1100)  Resp: 16 (08/26/17 0735)  BP: (!) 116/55 (08/26/17 0735)  SpO2: 98 % (08/26/17 1115) Vital Signs (24h Range):  Temp:  [98.3 °F (36.8 °C)-100 °F (37.8 °C)] 98.3 °F (36.8 °C)  Pulse:  [78-96] 87  Resp:  [16-20] 16  SpO2:  [91 %-100 %] 98 %  BP: (100-122)/(53-60) 116/55     Weight: 55.9 kg (123 lb 3.8 oz)  Body mass index is 19.89 kg/m².    Intake/Output Summary (Last 24 hours) at 08/26/17 1216  Last data filed at 08/26/17 0300   Gross per 24 hour   Intake              756 ml   Output              600 ml   Net              156 ml      Physical Exam   Constitutional: She is active and cooperative. No distress.   HENT:   Head: Normocephalic and atraumatic.   Eyes: Conjunctivae and EOM are normal.   Neck: Normal range of motion. Neck supple.   Cardiovascular: Normal rate and regular rhythm.  Exam reveals gallop (S3).    Pulmonary/Chest: No respiratory distress. She has wheezes (diffuse). She has rales (Bibasilar).   Abdominal: Soft. She exhibits no  distension. There is no tenderness. There is no guarding.   Musculoskeletal: She exhibits edema. She exhibits no tenderness.   Neurological: She is alert. No cranial nerve deficit.   Skin: Skin is warm and dry.   Psychiatric: She has a normal mood and affect. Her behavior is normal.       Significant Labs:   A1C:     Recent Labs  Lab 06/23/17  0847 08/14/17  1227   HGBA1C 10.3* 9.1*     CBC:     Recent Labs  Lab 08/25/17  0331 08/26/17  0457   WBC 10.87 8.57   HGB 12.7 12.9   HCT 36.9* 37.5    258     CMP:     Recent Labs  Lab 08/25/17  0331 08/25/17  1608 08/26/17  0457    139 141   K 3.0* 3.8 3.4*    101 104   CO2 27 30* 27   GLU 36* 193* 80   BUN 40* 35* 27   CREATININE 1.1 1.1 0.9   CALCIUM 8.7 8.5* 8.3*   PROT 6.1  --  5.6*   ALBUMIN 2.9*  --  2.4*   BILITOT 0.5  --  0.6   ALKPHOS 75  --  75   AST 43*  --  37   ALT 35  --  31   ANIONGAP 8 8 10   EGFRNONAA 44.0* 44.0* 56.1*     Coagulation: No results for input(s): INR, APTT in the last 48 hours.    Invalid input(s): PT  Lactic Acid:     Recent Labs  Lab 08/24/17  1307 08/26/17  0457   LACTATE 2.8* 1.4       Significant Imaging: I have reviewed and interpreted all pertinent imaging results/findings within the past 24 hours.

## 2017-08-26 NOTE — SUBJECTIVE & OBJECTIVE
"Interval HPI:   Overnight events: Breathing better. Denies nausea.  Eatin%  Nausea: No  Hypoglycemia and intervention: No  Fever: No    BP (!) 116/55 (BP Location: Right arm, Patient Position: Lying)   Pulse 83   Temp 98.3 °F (36.8 °C) (Oral)   Resp 16   Ht 5' 6" (1.676 m)   Wt 55.9 kg (123 lb 3.8 oz)   SpO2 (!) 94%   Breastfeeding? No   BMI 19.89 kg/m²     Labs Reviewed and Include      Recent Labs  Lab 17  0457   GLU 80   CALCIUM 8.3*   ALBUMIN 2.4*   PROT 5.6*      K 3.4*   CO2 27      BUN 27   CREATININE 0.9   ALKPHOS 75   ALT 31   AST 37   BILITOT 0.6     Lab Results   Component Value Date    WBC 8.57 2017    HGB 12.9 2017    HCT 37.5 2017    MCV 90 2017     2017     No results for input(s): TSH, FREET4 in the last 168 hours.  Lab Results   Component Value Date    HGBA1C 9.1 (H) 2017       Nutritional status:   Body mass index is 19.89 kg/m².  Lab Results   Component Value Date    ALBUMIN 2.4 (L) 2017    ALBUMIN 2.9 (L) 2017    ALBUMIN 3.0 (L) 2017     No results found for: PREALBUMIN    Estimated Creatinine Clearance: 35.9 mL/min (based on Cr of 0.9).    Accu-Checks  Recent Labs      17   0039  17   0111  17   0543  17   0606  17   0933  17   1256  17   1732  17   2131  17   0604  17   0857   POCTGLUCOSE  194*  117*  49*  184*  93  140*  156*  74  101  155*       Current Medications and/or Treatments Impacting Glycemic Control  Immunotherapy:  Immunosuppressants     None        Steroids:   Hormones     None        Pressors:    Autonomic Drugs     None        Hyperglycemia/Diabetes Medications: Antihyperglycemics     Start     Stop Route Frequency Ordered    17 1300  insulin detemir pen 15 Units      -- SubQ Daily 17 1134    17 1215  insulin aspart pen 5 Units      -- SubQ 3 times daily with meals 17 1134    17 1321  insulin " aspart pen 0-5 Units      -- SubQ Before meals & nightly PRN 08/24/17 2776

## 2017-08-26 NOTE — ASSESSMENT & PLAN NOTE
No further episodes of hypoglycemia, glucose better managed   Patient received both SQ insulin and IV insulin leading to stacking  HgbA1c: 9.1   Endocrinology consult  Due to recent hypoglycemia and history of brittle diabetes, recommend starting Levemir 20 units AC and novolog 4 units post prandial with sliding scale

## 2017-08-26 NOTE — PLAN OF CARE
Problem: Patient Care Overview  Goal: Plan of Care Review  Outcome: Ongoing (interventions implemented as appropriate)  Patient is AAOx4, VSS. Weaned off oxygen, sating above 94% on room air. Patient denies pain, worked with PT and got up to chair for majority of shift. TB test placed in preparation for transfer. Pt is free from falls and injury. No acute events during shift. WCTM.

## 2017-08-26 NOTE — ASSESSMENT & PLAN NOTE
Intake and output  Daily weights   Transition Lasix to PO   Pro BNP: 2,600  Nelson placed in the ER, now removed  Seen by Cardiology who recommended holding beta blockers and diuresing  Cardiac monitor   Wean off oxygen

## 2017-08-26 NOTE — PROGRESS NOTES
[facility] Hospital Medicine  Progress Note    Patient Name: Irma Pedroza  MRN: [id number]  Patient Class: IP- Inpatient   Admission Date: 8/24/2017  Length of Stay: 2 days  Attending Physician: Darci Ha MD  Primary Care Provider: Leonidas Potts MD    [facility] Medicine Team: [facility] L Darci Ha MD    Subjective:     Principal Problem:Acute on chronic combined systolic and diastolic congestive heart failure    HPI:  89 y.o. female with a history significant for CAD with recent STEMI on 8/14, no revascularization as patient is DNR and did not want invasive procedures.  She was noted to have depressed ejection fraction to 25%, was treated medically and discharged to skilled nursing.  She had dyspnea on exertion at discharge which has been progressive, orthopnea requiring elevation at night, and the last 2 nights with shortness of breath.  Tonight, she progressively became extremely short of breath and presented to ER, requiring NIPPV to maintain sats >90%.  She denies chest pain, but complains of dyspnea as above, along with some nausea at home. She is a non-smoker, but is a brittle diabetic. Denies any fever, chills, sick contacts in the nursing home. Started on BIPAP by the ER and weaned off to nasal canula after Lasix dose administered. Patient is DNR/DNI and does not want any heroic measures or invasive procedures.   ?      Hospital Course:  89 yo F with a PMH of recent STEMI, CHF with EF of 25%, presents with worsening shortness of breath and hypoxemia from skilled nursing facility. Started on Lasix IV pushes with yielded several fluids of urine and oxygen was changed to nasal canula. Insulin drip started due to Glucose >500 despite Iv insulin pushes, subsequently patient became hypoglycemic with no symptoms and started on Dextrose infusion which resolved hypoglycemia. Endocrinology consulted for aid in glucose management.     Interval History: Patient seen and  examined at bedside, no acute events overnight. Patient complains of constipation. Miralax ordered. Oxygen weaned to 1 liter today.     Review of Systems   Constitutional: Negative for chills and fever.   HENT: Negative for congestion and rhinorrhea.    Eyes: Negative for discharge and redness.   Respiratory: Positive for shortness of breath. Negative for cough and chest tightness.    Cardiovascular: Negative for chest pain and leg swelling.   Gastrointestinal: Negative for abdominal pain, diarrhea, nausea and vomiting.   Endocrine: Negative for polydipsia and polyuria.   Genitourinary: Positive for frequency. Negative for urgency.   Musculoskeletal: Negative for arthralgias and myalgias.   Neurological: Positive for dizziness. Negative for weakness.   Psychiatric/Behavioral: Negative for agitation and sleep disturbance.     Objective:     Vital Signs (Most Recent):  Temp: 98.3 °F (36.8 °C) (08/26/17 0735)  Pulse: 87 (08/26/17 1100)  Resp: 16 (08/26/17 0735)  BP: (!) 116/55 (08/26/17 0735)  SpO2: 98 % (08/26/17 1115) Vital Signs (24h Range):  Temp:  [98.3 °F (36.8 °C)-100 °F (37.8 °C)] 98.3 °F (36.8 °C)  Pulse:  [78-96] 87  Resp:  [16-20] 16  SpO2:  [91 %-100 %] 98 %  BP: (100-122)/(53-60) 116/55     Weight: 55.9 kg (123 lb 3.8 oz)  Body mass index is 19.89 kg/m².    Intake/Output Summary (Last 24 hours) at 08/26/17 1216  Last data filed at 08/26/17 0300   Gross per 24 hour   Intake              756 ml   Output              600 ml   Net              156 ml      Physical Exam   Constitutional: She is active and cooperative. No distress.   HENT:   Head: Normocephalic and atraumatic.   Eyes: Conjunctivae and EOM are normal.   Neck: Normal range of motion. Neck supple.   Cardiovascular: Normal rate and regular rhythm.  Exam reveals gallop (S3).    Pulmonary/Chest: No respiratory distress. She has wheezes (diffuse). She has rales (Bibasilar).   Abdominal: Soft. She exhibits no distension. There is no tenderness. There is  no guarding.   Musculoskeletal: She exhibits edema. She exhibits no tenderness.   Neurological: She is alert. No cranial nerve deficit.   Skin: Skin is warm and dry.   Psychiatric: She has a normal mood and affect. Her behavior is normal.       Significant Labs:   A1C:     Recent Labs  Lab 06/23/17  0847 08/14/17  1227   HGBA1C 10.3* 9.1*     CBC:     Recent Labs  Lab 08/25/17  0331 08/26/17  0457   WBC 10.87 8.57   HGB 12.7 12.9   HCT 36.9* 37.5    258     CMP:     Recent Labs  Lab 08/25/17  0331 08/25/17  1608 08/26/17  0457    139 141   K 3.0* 3.8 3.4*    101 104   CO2 27 30* 27   GLU 36* 193* 80   BUN 40* 35* 27   CREATININE 1.1 1.1 0.9   CALCIUM 8.7 8.5* 8.3*   PROT 6.1  --  5.6*   ALBUMIN 2.9*  --  2.4*   BILITOT 0.5  --  0.6   ALKPHOS 75  --  75   AST 43*  --  37   ALT 35  --  31   ANIONGAP 8 8 10   EGFRNONAA 44.0* 44.0* 56.1*     Coagulation: No results for input(s): INR, APTT in the last 48 hours.    Invalid input(s): PT  Lactic Acid:     Recent Labs  Lab 08/24/17  1307 08/26/17  0457   LACTATE 2.8* 1.4       Significant Imaging: I have reviewed and interpreted all pertinent imaging results/findings within the past 24 hours.    Assessment/Plan:      * Acute on chronic combined systolic and diastolic congestive heart failure    Intake and output  Daily weights   Transition Lasix to PO   Pro BNP: 2,600  Nelson placed in the ER, now removed  Seen by Cardiology who recommended holding beta blockers and diuresing  Cardiac monitor   Wean off oxygen        Acute hypoxemic respiratory failure    Most likely due to pulmonary edema from congestive heart failure  Placed on BIPAP on admission          DNR (do not resuscitate)    Confirmed with patient and family   Signed copy in the chart           ST elevation myocardial infarction (STEMI)    - Continue medical therapy with ASA 81mg daily, clopidogrel 75mg daily, atorvastatin 40mg daily  - hold beta blockers for now given acute exacerbation   -  Treated medically on 08/14 at [facility], no angiogram given advanced age and DNR/DNI status  - avoid hypoglycemia episodes          Uncontrolled type 2 diabetes mellitus with complication, with long-term current use of insulin    No further episodes of hypoglycemia, glucose better managed   Patient received both SQ insulin and IV insulin leading to stacking  HgbA1c: 9.1   Endocrinology consult  Due to recent hypoglycemia and history of brittle diabetes, recommend starting Levemir 20 units AC and novolog 4 units post prandial with sliding scale            VTE Risk Mitigation         Ordered     Medium Risk of VTE  Once      08/24/17 0550              Darci Ha MD  Department of Hospital Medicine   [facility]

## 2017-08-26 NOTE — PLAN OF CARE
Problem: Patient Care Overview  Goal: Plan of Care Review  Outcome: Ongoing (interventions implemented as appropriate)  Pt AAOx4. No falls or injuries during shift. Pt complained of pain in lower back, obtained 1x dose tylenol which pt later refused. Pt on 2L oxygen via [place]. No other changes, will continue to monitor.

## 2017-08-26 NOTE — ASSESSMENT & PLAN NOTE
BG better today on 15-5-5-5.  Will relax A1c goal to ~8 given comorbid conditions and limited life-expectancy. Will not shoot for tight control at the expense of low glucose.  Continue current regimen for now and will make adjustments based on POC checks.    Discharge planning: TBD.

## 2017-08-26 NOTE — ASSESSMENT & PLAN NOTE
- Continue medical therapy with ASA 81mg daily, clopidogrel 75mg daily, atorvastatin 40mg daily  - hold beta blockers for now given acute exacerbation   - Treated medically on 08/14 at [facility], no angiogram given advanced age and DNR/DNI status  - avoid hypoglycemia episodes

## 2017-08-26 NOTE — PT/OT/SLP EVAL
Occupational Therapy  Evaluation & Treatment    Irma Pedroza   MRN: [id number]   Admitting Diagnosis: Acute on chronic combined systolic and diastolic congestive heart failure    OT Date of Treatment: 08/26/17   OT Start Time: 1020  OT Stop Time: 1045  OT Total Time (min): 25 min    Billable Minutes:  Evaluation 15  Therapeutic Activity 10    Diagnosis: Acute on chronic combined systolic and diastolic congestive heart failure       Past Medical History:   Diagnosis Date    Anxiety     Depression     Diabetes mellitus, type 2     Hyperlipidemia     Thyroid disease       Past Surgical History:   Procedure Laterality Date    BLADDER SUSPENSION      CATARACT EXTRACTION      HYSTERECTOMY      ORIF CONGENITAL HIP DISLOCATION         Referring physician: Leland  Date referred to OT: 08/25/17    General Precautions: Standard, fall, hearing impaired  Orthopedic Precautions: N/A  Braces: N/A          Patient History:  Living Environment  Lives With: alone  Living Arrangements: house  Home Accessibility: stairs to enter home  Transportation Available: family or friend will provide  Living Environment Comment:  (Pt readmitted from SNF - prior to original hospitalization, pt lived alone in [facility] with threshold to enter - amb with RW and I with ADLs - walk in shower with SC - son lives near by and cooks for her - hired help for cleaning)  Equipment Currently Used at Home: bedside commode, walker, rolling, shower chair, wheelchair    Prior level of function:   Bed Mobility/Transfers: independent  Grooming: independent  Bathing: independent  Upper Body Dressing: independent  Lower Body Dressing: independent  Toileting: independent  Homemaking Responsibilities: No  Driving License: No  Mode of Transportation: Family  Occupation: Retired  Leisure and Hobbies:  (puzzles and tv)  IADL Comments:  (Pt readmitted from SNF - prior to original hospitalization, pt lived alone in [facility] with threshold to enter - amb with RW and I  "with ADLs - walk in shower with SC - son lives near by and cooks for her - hired help for cleaning)     Dominant hand: right    Subjective:  Communicated with nurse prior to session.  "My feet are hurting, but I am feeling a lot better."  Chief Complaint: pain in feet from neuropathy  Patient/Family stated goals: regain PLOF    Pain/Comfort  Pain Rating 1: 2/10  Location - Side 1: Bilateral  Location 1: foot  Pain Addressed 1: Reposition, Distraction, Cessation of Activity  Pain Rating Post-Intervention 1: 2/10    Objective:  Patient found with: oxygen    Cognitive Exam:  Oriented to: Person, Place, Time and Situation  Follows Commands/attention: Follows two-step commands  Communication: clear/fluent and [place]  Memory:  No Deficits noted  Safety awareness/insight to disability: impaired  Coping skills/emotional control: Appropriate to situation    Visual/perceptual:  Intact    Physical Exam:  Postural examination/scapula alignment: Rounded shoulder and Head forward  Skin integrity: Dry  Edema: None noted     Sensation:   Diminished light touch due to neuropathy in feet    Upper Extremity Range of Motion:  Right Upper Extremity: WFL  Left Upper Extremity: WFL    Upper Extremity Strength:  Right Upper Extremity: WFL  Left Upper Extremity: WFL   Strength: WFL    Fine motor coordination:   Intact    Gross motor coordination: WFL    Functional Mobility:  Bed Mobility:  Rolling/Turning to Left: Supervision  Rolling/Turning Right: Supervision  Scooting/Bridging: Supervision  Supine to Sit: Supervision    Transfers:  Sit <> Stand Assistance: Contact Guard Assistance  Sit <> Stand Assistive Device: Rolling Walker  Bed <> Chair Technique: Stand Pivot  Bed <> Chair Transfer Assistance: Contact Guard Assistance (1 LOB)  Bed <> Chair Assistive Device: Rolling Walker  Toilet Transfer Technique: Stand Pivot  Toilet Transfer Assistance: Contact Guard Assistance (1 LOB)  Toilet Transfer Assistive Device: Rolling Walker, bedside " commode    Functional Ambulation: pt able to take a few steps from BSC to chair using RW and CGA - 1 episode of LOB which pt recovered from with assist    Activities of Daily Living:  Feeding Level of Assistance: Set-up Assistance    UE Dressing Level of Assistance: Minimum assistance (donned robe -sitting EOB - assist to bring around back)    LE Dressing Level of Assistance: Set-up Assistance (donned/doffed socks - sitting in chair)    Grooming Position: Seated  Grooming Level of Assistance: Supervision (washed face and hands;  oral care) set-up asist    Toileting Where Assessed: Bedside commode  Toileting Level of Assistance: Contact guard       Balance:   Static Sit: GOOD: Takes MODERATE challenges from all directions  Dynamic Sit: GOOD: Maintains balance through MODERATE excursions of active trunk movement  Static Stand: FAIR: Maintains without assist but unable to take challenges  Dynamic stand: FAIR: Needs CONTACT GUARD during gait    Therapeutic Activities and Exercises:  · Pt completed ADLs and func mobility activities for tx session as noted above  Pt completed 1 set of 15 reps of B UE AROM exercises in order to work towards increasing her UB strength/endurance to assist with mobility and self care skills.  Pt completed the following exercises: shoulder flex/ext, elbow flex/ext, and forearm sup/pro.   · Whiteboard updated  · Pt educated on role of OT and POC      AM-PAC 6 CLICK ADL  How much help from another person does this patient currently need?  1 = Unable, Total/Dependent Assistance  2 = A lot, Maximum/Moderate Assistance  3 = A little, Minimum/Contact Guard/Supervision  4 = None, Modified [place]/Independent    Putting on and taking off regular lower body clothing? : 3  Bathing (including washing, rinsing, drying)?: 3  Toileting, which includes using toilet, bedpan, or urinal? : 3  Putting on and taking off regular upper body clothing?: 3  Taking care of personal grooming such as brushing  "teeth?: 3  Eating meals?: 4  Total Score: 19    AM-PAC Raw Score CMS "G-Code Modifier Level of Impairment Assistance   6 % Total / Unable   7 - 9 CM 80 - 100% Maximal Assist   10-14 CL 60 - 80% Moderate Assist   15 - 19 CK 40 - 60% Moderate Assist   20 - 22 CJ 20 - 40% Minimal Assist   23 CI 1-20% SBA / CGA   24 CH 0% Independent/ Mod I       Patient left up in chair with all lines intact, call button in reach and family present    Assessment:  Irma Pedroza is a 89 y.o. female with a medical diagnosis of Acute on chronic combined systolic and diastolic congestive heart failure and presents with decreased balance, gait instability, decreased strength/endurance and decreased ADLs.  Pt was agreeable to OT evaluation.  Goals established to assist pt with returning to OF regarding ADLs and func mobility.  Pt's primary barrier to safe and independent transition home alone are her decreased endurance/balance which resulted to a history of falls.  Pt will benefit from skilled OT services in order to increase her level of safety and independence with ADLs and mobility. Pt and family agree that she is not safe to return home alone.  They are pursuing NH placement for home for veterans located close to family.  OT recommends HHOT in the NH setting in order for pt to continue to work towards baseline at post acute setting.  No DME recommendations are made at this time.     Rehab identified problem list/impairments: Rehab identified problem list/impairments: weakness, impaired endurance, impaired self care skills, impaired sensation, impaired functional mobilty, gait instability, impaired balance, decreased coordination, decreased lower extremity function, decreased safety awareness, pain, impaired cardiopulmonary response to activity    Rehab potential is good.    Activity tolerance: Good    Discharge recommendations: Discharge Facility/Level Of Care Needs: home health OT (per family preferance and pt agreement, pt " will be discharged to nursing home - recommend HHOT in NH setting)     Barriers to discharge: Barriers to Discharge: Decreased caregiver support    Equipment recommendations: none     GOALS:    Occupational Therapy Goals        Problem: Occupational Therapy Goal    Goal Priority Disciplines Outcome Interventions   Occupational Therapy Goal     OT, PT/OT Ongoing (interventions implemented as appropriate)    Description:  Goals to be met by: 09/03/17     Patient will increase functional independence with ADLs by performing:    UE Dressing with Modified [place].  LE Dressing with Modified [place].  Grooming while standing with Modified [place].  Toileting from bedside commode with Modified [place] for hygiene and clothing management.   Toilet transfer to bedside commode with Modified [place].  Upper extremity exercise program x15 reps per handout, with independence.                      PLAN:  Patient to be seen 5 x/week to address the above listed problems via self-care/home management, therapeutic activities, therapeutic exercises  Plan of Care expires: 09/24/17  Plan of Care reviewed with: patient, son, daughter         Mouna Elias, OT  08/26/2017

## 2017-08-26 NOTE — PLAN OF CARE
Problem: Occupational Therapy Goal  Goal: Occupational Therapy Goal  Goals to be met by: 09/03/17     Patient will increase functional independence with ADLs by performing:    UE Dressing with Modified [place].  LE Dressing with Modified [place].  Grooming while standing with Modified [place].  Toileting from bedside commode with Modified [place] for hygiene and clothing management.   Toilet transfer to bedside commode with Modified [place].  Upper extremity exercise program x15 reps per handout, with independence.    Outcome: Ongoing (interventions implemented as appropriate)    Pt was agreeable to OT evaluation.  Goals established to assist pt with returning to [facility] regarding ADLs and func mobility.  Pt will benefit from skilled OT services in order to increase her level of safety and independence with ADLs and mobility.      Mouna Elias, OT  8/26/2017

## 2017-08-27 LAB
ALBUMIN SERPL BCP-MCNC: 2.5 G/DL
ALP SERPL-CCNC: 80 U/L
ALT SERPL W/O P-5'-P-CCNC: 29 U/L
ANION GAP SERPL CALC-SCNC: 10 MMOL/L
AST SERPL-CCNC: 29 U/L
BASOPHILS # BLD AUTO: 0.02 K/UL
BASOPHILS NFR BLD: 0.3 %
BILIRUB SERPL-MCNC: 0.6 MG/DL
BUN SERPL-MCNC: 25 MG/DL
CALCIUM SERPL-MCNC: 8.5 MG/DL
CHLORIDE SERPL-SCNC: 104 MMOL/L
CO2 SERPL-SCNC: 28 MMOL/L
CREAT SERPL-MCNC: 1 MG/DL
DIFFERENTIAL METHOD: NORMAL
EOSINOPHIL # BLD AUTO: 0.3 K/UL
EOSINOPHIL NFR BLD: 4.3 %
ERYTHROCYTE [DISTWIDTH] IN BLOOD BY AUTOMATED COUNT: 13.6 %
EST. GFR  (AFRICAN AMERICAN): 56.9 ML/MIN/1.73 M^2
EST. GFR  (NON AFRICAN AMERICAN): 49.4 ML/MIN/1.73 M^2
GLUCOSE SERPL-MCNC: 265 MG/DL
HCT VFR BLD AUTO: 40.3 %
HGB BLD-MCNC: 13.5 G/DL
LYMPHOCYTES # BLD AUTO: 1.5 K/UL
LYMPHOCYTES NFR BLD: 23.6 %
MAGNESIUM SERPL-MCNC: 1.8 MG/DL
MCH RBC QN AUTO: 31 PG
MCHC RBC AUTO-ENTMCNC: 33.5 G/DL
MCV RBC AUTO: 93 FL
MONOCYTES # BLD AUTO: 0.6 K/UL
MONOCYTES NFR BLD: 8.9 %
NEUTROPHILS # BLD AUTO: 3.9 K/UL
NEUTROPHILS NFR BLD: 62.4 %
PHOSPHATE SERPL-MCNC: 2.8 MG/DL
PLATELET # BLD AUTO: 278 K/UL
PMV BLD AUTO: 10.9 FL
POCT GLUCOSE: 139 MG/DL (ref 70–110)
POCT GLUCOSE: 250 MG/DL (ref 70–110)
POCT GLUCOSE: 295 MG/DL (ref 70–110)
POCT GLUCOSE: 298 MG/DL (ref 70–110)
POCT GLUCOSE: 84 MG/DL (ref 70–110)
POTASSIUM SERPL-SCNC: 3.9 MMOL/L
PROT SERPL-MCNC: 6 G/DL
RBC # BLD AUTO: 4.35 M/UL
SODIUM SERPL-SCNC: 142 MMOL/L
WBC # BLD AUTO: 6.31 K/UL

## 2017-08-27 PROCEDURE — 80053 COMPREHEN METABOLIC PANEL: CPT

## 2017-08-27 PROCEDURE — 85025 COMPLETE CBC W/AUTO DIFF WBC: CPT

## 2017-08-27 PROCEDURE — 84100 ASSAY OF PHOSPHORUS: CPT

## 2017-08-27 PROCEDURE — 83735 ASSAY OF MAGNESIUM: CPT

## 2017-08-27 PROCEDURE — 99232 SBSQ HOSP IP/OBS MODERATE 35: CPT | Mod: ,,, | Performed by: INTERNAL MEDICINE

## 2017-08-27 PROCEDURE — 11000001 HC ACUTE MED/SURG PRIVATE ROOM

## 2017-08-27 PROCEDURE — 99233 SBSQ HOSP IP/OBS HIGH 50: CPT | Mod: ,,, | Performed by: HOSPITALIST

## 2017-08-27 PROCEDURE — 25000003 PHARM REV CODE 250: Performed by: HOSPITALIST

## 2017-08-27 RX ORDER — INSULIN ASPART 100 [IU]/ML
6 INJECTION, SOLUTION INTRAVENOUS; SUBCUTANEOUS
Status: DISCONTINUED | OUTPATIENT
Start: 2017-08-28 | End: 2017-08-30 | Stop reason: HOSPADM

## 2017-08-27 RX ADMIN — INSULIN ASPART 5 UNITS: 100 INJECTION, SOLUTION INTRAVENOUS; SUBCUTANEOUS at 05:08

## 2017-08-27 RX ADMIN — LOSARTAN POTASSIUM 25 MG: 25 TABLET, FILM COATED ORAL at 09:08

## 2017-08-27 RX ADMIN — INSULIN DETEMIR 15 UNITS: 100 INJECTION, SOLUTION SUBCUTANEOUS at 09:08

## 2017-08-27 RX ADMIN — INSULIN ASPART 5 UNITS: 100 INJECTION, SOLUTION INTRAVENOUS; SUBCUTANEOUS at 07:08

## 2017-08-27 RX ADMIN — FUROSEMIDE 40 MG: 40 TABLET ORAL at 09:08

## 2017-08-27 RX ADMIN — INSULIN ASPART 2 UNITS: 100 INJECTION, SOLUTION INTRAVENOUS; SUBCUTANEOUS at 12:08

## 2017-08-27 RX ADMIN — LEVOTHYROXINE SODIUM 75 MCG: 75 TABLET ORAL at 06:08

## 2017-08-27 RX ADMIN — MIRTAZAPINE 15 MG: 7.5 TABLET ORAL at 08:08

## 2017-08-27 RX ADMIN — ATORVASTATIN CALCIUM 40 MG: 20 TABLET, FILM COATED ORAL at 09:08

## 2017-08-27 RX ADMIN — CLOPIDOGREL 75 MG: 75 TABLET, FILM COATED ORAL at 09:08

## 2017-08-27 RX ADMIN — ASPIRIN 81 MG: 81 TABLET, COATED ORAL at 09:08

## 2017-08-27 RX ADMIN — INSULIN ASPART 5 UNITS: 100 INJECTION, SOLUTION INTRAVENOUS; SUBCUTANEOUS at 12:08

## 2017-08-27 RX ADMIN — POLYETHYLENE GLYCOL 3350 17 G: 17 POWDER, FOR SOLUTION ORAL at 09:08

## 2017-08-27 RX ADMIN — ALPRAZOLAM 0.25 MG: 0.25 TABLET ORAL at 09:08

## 2017-08-27 RX ADMIN — THERA TABS 1 TABLET: TAB at 09:08

## 2017-08-27 RX ADMIN — INSULIN ASPART 3 UNITS: 100 INJECTION, SOLUTION INTRAVENOUS; SUBCUTANEOUS at 07:08

## 2017-08-27 RX ADMIN — ALPRAZOLAM 0.25 MG: 0.25 TABLET ORAL at 08:08

## 2017-08-27 RX ADMIN — FUROSEMIDE 40 MG: 40 TABLET ORAL at 05:08

## 2017-08-27 RX ADMIN — Medication 1000 UNITS: at 09:08

## 2017-08-27 NOTE — PROGRESS NOTES
"[facility]  Endocrinology  Progress Note    Admit Date: 8/24/2017     89 y/o PMH DM2, HLD, CAD s/p STEMI 08/14 (medically managed) who presented with chief complaint shortness of breath and found to be in decompensated heart failure.   Reason for Consult: Management of T2DM, Hyperglycemia/Hypoglycemia     Surgical Procedure and Date: n/a    Diabetes diagnosis year: per report 2011 or 2012    Home Diabetes Medications:  Lantus 30u qAM and trajenta 5mg qd (previously on invokana which was stopped due to weight loss)     How often checking glucose at home? Once every other day fasting in AM before Lantus    BG readings on regimen: Reports she is unable to remember but not abnormal to be 60-70 in AM    Hypoglycemia on the regimen?  Yes  Missed doses on regimen?  No    Diabetes Complications include:     Hyperglycemia, Hypoglycemia  and Hypoglycemia unawareness    Complicating diabetes co morbidities:   History of MI and CHF        Interval HPI:   Overnight events:  Appetite 75%. No hypoglycemia. Elevated FBS and bedtime excursions noted.    BP (!) 120/57 (BP Location: Right arm, Patient Position: Sitting)   Pulse 75   Temp 97.8 °F (36.6 °C) (Oral)   Resp 16   Ht 5' 6" (1.676 m)   Wt 55.6 kg (122 lb 9.2 oz)   SpO2 (!) 91%   Breastfeeding? No   BMI 19.78 kg/m²       Labs Reviewed and Include      Recent Labs  Lab 08/27/17  0502   *   CALCIUM 8.5*   ALBUMIN 2.5*   PROT 6.0      K 3.9   CO2 28      BUN 25   CREATININE 1.0   ALKPHOS 80   ALT 29   AST 29   BILITOT 0.6     Lab Results   Component Value Date    WBC 6.31 08/27/2017    HGB 13.5 08/27/2017    HCT 40.3 08/27/2017    MCV 93 08/27/2017     08/27/2017     No results for input(s): TSH, FREET4 in the last 168 hours.  Lab Results   Component Value Date    HGBA1C 9.1 (H) 08/14/2017       Nutritional status:   Body mass index is 19.78 kg/m².  Lab Results   Component Value Date    ALBUMIN 2.5 (L) 08/27/2017    ALBUMIN " 2.4 (L) 08/26/2017    ALBUMIN 2.9 (L) 08/25/2017     No results found for: PREALBUMIN    Estimated Creatinine Clearance: 32.2 mL/min (based on Cr of 1).    Accu-Checks  Recent Labs      08/25/17   0606  08/25/17   0933  08/25/17   1256  08/25/17   1732  08/25/17   2131  08/26/17   0604  08/26/17   0857  08/26/17   1224  08/26/17   1737  08/26/17   2205   POCTGLUCOSE  184*  93  140*  156*  74  101  155*  123*  149*  283*       Current Medications and/or Treatments Impacting Glycemic Control  Immunotherapy:  Immunosuppressants     None        Steroids:   Hormones     None        Pressors:    Autonomic Drugs     None        Hyperglycemia/Diabetes Medications: Antihyperglycemics     Start     Stop Route Frequency Ordered    08/25/17 1300  insulin detemir pen 15 Units      -- SubQ Daily 08/25/17 1134    08/25/17 1215  insulin aspart pen 5 Units      -- SubQ 3 times daily with meals 08/25/17 1134    08/24/17 1321  insulin aspart pen 0-5 Units      -- SubQ Before meals & nightly PRN 08/24/17 1221          ASSESSMENT and PLAN    Uncontrolled type 2 diabetes mellitus with complication, with long-term current use of insulin    BG goal 140-180  Will relax A1c goal to ~8 given comorbid conditions and limited life-expectancy. Will not shoot for tight control at the expense of low glucose.  rec small changes: increase novolog 6 units ac, increase levemir 17 units daily and continue to titrate prn    Discharge planning: MDI as above  To sign off, please call in any questions. Thank you for the consult.          DNR (do not resuscitate)     Per patient wants to avoid invasive procedures or interventions  Will relax A1c goals.       ST elevation myocardial infarction (STEMI)     08/14, medically managed  Avoid hypoglycemia                  * Acute on chronic combined systolic and diastolic congestive heart failure     Managed per primary  Avoid hypoglycemia  On ARB/BB          Aimee Gill MD  Endocrinology  [facility]  Kylie-Tanja Castillo MD,  have personally taken the history and examined the patient and agree with the resident's note as stated above.

## 2017-08-27 NOTE — SUBJECTIVE & OBJECTIVE
"Interval HPI:   Overnight events:  Appetite 75%. No hypoglycemia. Elevated FBS and bedtime excursions noted.    BP (!) 120/57 (BP Location: Right arm, Patient Position: Sitting)   Pulse 75   Temp 97.8 °F (36.6 °C) (Oral)   Resp 16   Ht 5' 6" (1.676 m)   Wt 55.6 kg (122 lb 9.2 oz)   SpO2 (!) 91%   Breastfeeding? No   BMI 19.78 kg/m²     Labs Reviewed and Include      Recent Labs  Lab 08/27/17  0502   *   CALCIUM 8.5*   ALBUMIN 2.5*   PROT 6.0      K 3.9   CO2 28      BUN 25   CREATININE 1.0   ALKPHOS 80   ALT 29   AST 29   BILITOT 0.6     Lab Results   Component Value Date    WBC 6.31 08/27/2017    HGB 13.5 08/27/2017    HCT 40.3 08/27/2017    MCV 93 08/27/2017     08/27/2017     No results for input(s): TSH, FREET4 in the last 168 hours.  Lab Results   Component Value Date    HGBA1C 9.1 (H) 08/14/2017       Nutritional status:   Body mass index is 19.78 kg/m².  Lab Results   Component Value Date    ALBUMIN 2.5 (L) 08/27/2017    ALBUMIN 2.4 (L) 08/26/2017    ALBUMIN 2.9 (L) 08/25/2017     No results found for: PREALBUMIN    Estimated Creatinine Clearance: 32.2 mL/min (based on Cr of 1).    Accu-Checks  Recent Labs      08/25/17   0606  08/25/17   0933  08/25/17   1256  08/25/17   1732  08/25/17   2131  08/26/17   0604  08/26/17   0857  08/26/17   1224  08/26/17   1737  08/26/17   2205   POCTGLUCOSE  184*  93  140*  156*  74  101  155*  123*  149*  283*       Current Medications and/or Treatments Impacting Glycemic Control  Immunotherapy:  Immunosuppressants     None        Steroids:   Hormones     None        Pressors:    Autonomic Drugs     None        Hyperglycemia/Diabetes Medications: Antihyperglycemics     Start     Stop Route Frequency Ordered    08/25/17 1300  insulin detemir pen 15 Units      -- SubQ Daily 08/25/17 1134    08/25/17 1215  insulin aspart pen 5 Units      -- SubQ 3 times daily with meals 08/25/17 1134    08/24/17 1321  insulin aspart pen 0-5 Units      -- SubQ " Before meals & nightly PRN 08/24/17 1353

## 2017-08-27 NOTE — SUBJECTIVE & OBJECTIVE
Interval History: Patient seen and examined at bedside, no acute events overnight. Patient complains of constipation. Abdominal Xray ordered. Now breathing on room air.     Review of Systems   Constitutional: Negative for chills and fever.   HENT: Negative for congestion and rhinorrhea.    Eyes: Negative for discharge and redness.   Respiratory: Positive for shortness of breath. Negative for cough and chest tightness.    Cardiovascular: Negative for chest pain and leg swelling.   Gastrointestinal: Negative for abdominal pain, diarrhea, nausea and vomiting.   Endocrine: Negative for polydipsia and polyuria.   Genitourinary: Positive for frequency. Negative for urgency.   Musculoskeletal: Negative for arthralgias and myalgias.   Neurological: Positive for dizziness. Negative for weakness.   Psychiatric/Behavioral: Negative for agitation and sleep disturbance.     Objective:     Vital Signs (Most Recent):  Temp: 97.9 °F (36.6 °C) (08/27/17 1552)  Pulse: 98 (08/27/17 1552)  Resp: 16 (08/27/17 0745)  BP: (!) 115/51 (08/27/17 1552)  SpO2: 98 % (08/27/17 1552) Vital Signs (24h Range):  Temp:  [97.3 °F (36.3 °C)-98.5 °F (36.9 °C)] 97.9 °F (36.6 °C)  Pulse:  [75-98] 98  Resp:  [16-18] 16  SpO2:  [82 %-98 %] 98 %  BP: ()/(51-72) 115/51     Weight: 55.6 kg (122 lb 9.2 oz)  Body mass index is 19.78 kg/m².    Intake/Output Summary (Last 24 hours) at 08/27/17 1653  Last data filed at 08/27/17 0600   Gross per 24 hour   Intake              180 ml   Output                0 ml   Net              180 ml      Physical Exam   Constitutional: She is active and cooperative. No distress.   HENT:   Head: Normocephalic and atraumatic.   Eyes: Conjunctivae and EOM are normal.   Neck: Normal range of motion. Neck supple.   Cardiovascular: Normal rate and regular rhythm.  Exam reveals gallop (S3).    Pulmonary/Chest: No respiratory distress. She has wheezes (diffuse). She has rales (Bibasilar).   Abdominal: Soft. She exhibits no  distension. There is no tenderness. There is no guarding.   Musculoskeletal: She exhibits edema. She exhibits no tenderness.   Neurological: She is alert. No cranial nerve deficit.   Skin: Skin is warm and dry.   Psychiatric: She has a normal mood and affect. Her behavior is normal.       Significant Labs:   A1C:     Recent Labs  Lab 06/23/17  0847 08/14/17  1227   HGBA1C 10.3* 9.1*     CBC:     Recent Labs  Lab 08/26/17  0457 08/27/17  0503   WBC 8.57 6.31   HGB 12.9 13.5   HCT 37.5 40.3    278     CMP:     Recent Labs  Lab 08/26/17  0457 08/27/17  0502    142   K 3.4* 3.9    104   CO2 27 28   GLU 80 265*   BUN 27 25   CREATININE 0.9 1.0   CALCIUM 8.3* 8.5*   PROT 5.6* 6.0   ALBUMIN 2.4* 2.5*   BILITOT 0.6 0.6   ALKPHOS 75 80   AST 37 29   ALT 31 29   ANIONGAP 10 10   EGFRNONAA 56.1* 49.4*     Coagulation: No results for input(s): INR, APTT in the last 48 hours.    Invalid input(s): PT  Lactic Acid:     Recent Labs  Lab 08/26/17  0457   LACTATE 1.4       Significant Imaging: I have reviewed and interpreted all pertinent imaging results/findings within the past 24 hours.

## 2017-08-27 NOTE — PROGRESS NOTES
[facility] Hospital Medicine  Progress Note    Patient Name: Irma Pedroza  MRN: [id number]  Patient Class: IP- Inpatient   Admission Date: 8/24/2017  Length of Stay: 3 days  Attending Physician: Darci Ha MD  Primary Care Provider: Leonidas Potts MD    [facility] Medicine Team: [facility] HOSP MED L Darci Ha MD    Subjective:     Principal Problem:Acute on chronic combined systolic and diastolic congestive heart failure    HPI:  89 y.o. female with a history significant for CAD with recent STEMI on 8/14, no revascularization as patient is DNR and did not want invasive procedures.  She was noted to have depressed ejection fraction to 25%, was treated medically and discharged to skilled nursing.  She had dyspnea on exertion at discharge which has been progressive, orthopnea requiring elevation at night, and the last 2 nights with shortness of breath.  Tonight, she progressively became extremely short of breath and presented to ER, requiring NIPPV to maintain sats >90%.  She denies chest pain, but complains of dyspnea as above, along with some nausea at home. She is a non-smoker, but is a brittle diabetic. Denies any fever, chills, sick contacts in the nursing home. Started on BIPAP by the ER and weaned off to nasal canula after Lasix dose administered. Patient is DNR/DNI and does not want any heroic measures or invasive procedures.   ?      Hospital Course:  89 yo F with a PMH of recent STEMI, CHF with EF of 25%, presents with worsening shortness of breath and hypoxemia from skilled nursing facility. Started on Lasix IV pushes with yielded several fluids of urine and oxygen was changed to nasal canula. Insulin drip started due to Glucose >500 despite Iv insulin pushes, subsequently patient became hypoglycemic with no symptoms and started on Dextrose infusion which resolved hypoglycemia. Endocrinology consulted for aid in glucose management. SQ insulin as recommended by Endocrinology  resulted in improved glycemic control. Oxygen weaned off to room air.     Interval History: Patient seen and examined at bedside, no acute events overnight. Patient complains of constipation. Abdominal Xray ordered. Now breathing on room air.     Review of Systems   Constitutional: Negative for chills and fever.   HENT: Negative for congestion and rhinorrhea.    Eyes: Negative for discharge and redness.   Respiratory: Positive for shortness of breath. Negative for cough and chest tightness.    Cardiovascular: Negative for chest pain and leg swelling.   Gastrointestinal: Negative for abdominal pain, diarrhea, nausea and vomiting.   Endocrine: Negative for polydipsia and polyuria.   Genitourinary: Positive for frequency. Negative for urgency.   Musculoskeletal: Negative for arthralgias and myalgias.   Neurological: Positive for dizziness. Negative for weakness.   Psychiatric/Behavioral: Negative for agitation and sleep disturbance.     Objective:     Vital Signs (Most Recent):  Temp: 97.9 °F (36.6 °C) (08/27/17 1552)  Pulse: 98 (08/27/17 1552)  Resp: 16 (08/27/17 0745)  BP: (!) 115/51 (08/27/17 1552)  SpO2: 98 % (08/27/17 1552) Vital Signs (24h Range):  Temp:  [97.3 °F (36.3 °C)-98.5 °F (36.9 °C)] 97.9 °F (36.6 °C)  Pulse:  [75-98] 98  Resp:  [16-18] 16  SpO2:  [82 %-98 %] 98 %  BP: ()/(51-72) 115/51     Weight: 55.6 kg (122 lb 9.2 oz)  Body mass index is 19.78 kg/m².    Intake/Output Summary (Last 24 hours) at 08/27/17 1653  Last data filed at 08/27/17 0600   Gross per 24 hour   Intake              180 ml   Output                0 ml   Net              180 ml      Physical Exam   Constitutional: She is active and cooperative. No distress.   HENT:   Head: Normocephalic and atraumatic.   Eyes: Conjunctivae and EOM are normal.   Neck: Normal range of motion. Neck supple.   Cardiovascular: Normal rate and regular rhythm.  Exam reveals gallop (S3).    Pulmonary/Chest: No respiratory distress. She has wheezes  (diffuse). She has rales (Bibasilar).   Abdominal: Soft. She exhibits no distension. There is no tenderness. There is no guarding.   Musculoskeletal: She exhibits edema. She exhibits no tenderness.   Neurological: She is alert. No cranial nerve deficit.   Skin: Skin is warm and dry.   Psychiatric: She has a normal mood and affect. Her behavior is normal.       Significant Labs:   A1C:     Recent Labs  Lab 06/23/17  0847 08/14/17  1227   HGBA1C 10.3* 9.1*     CBC:     Recent Labs  Lab 08/26/17  0457 08/27/17  0503   WBC 8.57 6.31   HGB 12.9 13.5   HCT 37.5 40.3    278     CMP:     Recent Labs  Lab 08/26/17  0457 08/27/17  0502    142   K 3.4* 3.9    104   CO2 27 28   GLU 80 265*   BUN 27 25   CREATININE 0.9 1.0   CALCIUM 8.3* 8.5*   PROT 5.6* 6.0   ALBUMIN 2.4* 2.5*   BILITOT 0.6 0.6   ALKPHOS 75 80   AST 37 29   ALT 31 29   ANIONGAP 10 10   EGFRNONAA 56.1* 49.4*     Coagulation: No results for input(s): INR, APTT in the last 48 hours.    Invalid input(s): PT  Lactic Acid:     Recent Labs  Lab 08/26/17 0457   LACTATE 1.4       Significant Imaging: I have reviewed and interpreted all pertinent imaging results/findings within the past 24 hours.    Assessment/Plan:      * Acute on chronic combined systolic and diastolic congestive heart failure    Intake and output  Daily weights   Transition Lasix to PO   Pro BNP: 2,600  Nelson placed in the ER, now removed  Seen by Cardiology who recommended holding beta blockers and diuresing  Cardiac monitor   Off supplemental oxygen, breathing on room air         Acute hypoxemic respiratory failure    Most likely due to pulmonary edema from congestive heart failure  Now resolved and patient breathing on room air           DNR (do not resuscitate)    Confirmed with patient and family   Signed copy in the chart           ST elevation myocardial infarction (STEMI)    - Continue medical therapy with ASA 81mg daily, clopidogrel 75mg daily, atorvastatin 40mg daily  -  hold beta blockers for now given acute exacerbation   - Treated medically on 08/14 at [place], no angiogram given advanced age and DNR/DNI status  - avoid hypoglycemia episodes          Uncontrolled type 2 diabetes mellitus with complication, with long-term current use of insulin    No further episodes of hypoglycemia, glucose better managed   Patient received both SQ insulin and IV insulin leading to stacking  HgbA1c: 9.1   Endocrinology consult  Due to recent hypoglycemia and history of brittle diabetes, recommend starting Levemir 20 units AC and novolog 4 units post prandial with sliding scale            VTE Risk Mitigation         Ordered     Medium Risk of VTE  Once      08/24/17 0550              Darci Ha MD  Department of Hospital Medicine   [facility]

## 2017-08-27 NOTE — PLAN OF CARE
Problem: Patient Care Overview  Goal: Plan of Care Review  Pt remains AAOx4, vital signs have been stable. Pt satting >93% on room air. No complaints of pain during shift. Pt's personal belongings and call light within reach. No falls/injuries during shift.  Plan of care reviewed with pt, all questions and concerns were addressed. Will continue to monitor.

## 2017-08-27 NOTE — ASSESSMENT & PLAN NOTE
- Continue medical therapy with ASA 81mg daily, clopidogrel 75mg daily, atorvastatin 40mg daily  - hold beta blockers for now given acute exacerbation   - Treated medically on 08/14 at [place], no angiogram given advanced age and DNR/DNI status  - avoid hypoglycemia episodes

## 2017-08-27 NOTE — ASSESSMENT & PLAN NOTE
Intake and output  Daily weights   Transition Lasix to PO   Pro BNP: 2,600  Nelson placed in the ER, now removed  Seen by Cardiology who recommended holding beta blockers and diuresing  Cardiac monitor   Off supplemental oxygen, breathing on room air

## 2017-08-27 NOTE — ASSESSMENT & PLAN NOTE
Most likely due to pulmonary edema from congestive heart failure  Now resolved and patient breathing on room air

## 2017-08-27 NOTE — ASSESSMENT & PLAN NOTE
BG goal 140-180  Will relax A1c goal to ~8 given comorbid conditions and limited life-expectancy. Will not shoot for tight control at the expense of low glucose.  rec small changes: increase novolog 6 units ac, increase levemir 17 units daily and continue to titrate prn    Discharge planning: MDI as above  To sign off, please call in any questions. Thank you for the consult.

## 2017-08-28 LAB
ALBUMIN SERPL BCP-MCNC: 2.3 G/DL
ALP SERPL-CCNC: 68 U/L
ALT SERPL W/O P-5'-P-CCNC: 25 U/L
ANION GAP SERPL CALC-SCNC: 9 MMOL/L
AST SERPL-CCNC: 23 U/L
BACTERIA UR CULT: NORMAL
BACTERIA UR CULT: NORMAL
BASOPHILS # BLD AUTO: 0.02 K/UL
BASOPHILS NFR BLD: 0.4 %
BILIRUB SERPL-MCNC: 0.4 MG/DL
BUN SERPL-MCNC: 27 MG/DL
CALCIUM SERPL-MCNC: 8.1 MG/DL
CHLORIDE SERPL-SCNC: 106 MMOL/L
CO2 SERPL-SCNC: 26 MMOL/L
CREAT SERPL-MCNC: 0.9 MG/DL
DIFFERENTIAL METHOD: ABNORMAL
EOSINOPHIL # BLD AUTO: 0.2 K/UL
EOSINOPHIL NFR BLD: 3.2 %
ERYTHROCYTE [DISTWIDTH] IN BLOOD BY AUTOMATED COUNT: 13.6 %
EST. GFR  (AFRICAN AMERICAN): >60 ML/MIN/1.73 M^2
EST. GFR  (NON AFRICAN AMERICAN): 56.1 ML/MIN/1.73 M^2
GLUCOSE SERPL-MCNC: 95 MG/DL
HCT VFR BLD AUTO: 36.1 %
HGB BLD-MCNC: 12.5 G/DL
LYMPHOCYTES # BLD AUTO: 1.1 K/UL
LYMPHOCYTES NFR BLD: 18.7 %
MAGNESIUM SERPL-MCNC: 1.9 MG/DL
MCH RBC QN AUTO: 31 PG
MCHC RBC AUTO-ENTMCNC: 34.6 G/DL
MCV RBC AUTO: 90 FL
MONOCYTES # BLD AUTO: 0.6 K/UL
MONOCYTES NFR BLD: 10.6 %
NEUTROPHILS # BLD AUTO: 3.8 K/UL
NEUTROPHILS NFR BLD: 66.9 %
PHOSPHATE SERPL-MCNC: 3.6 MG/DL
PLATELET # BLD AUTO: 271 K/UL
PMV BLD AUTO: 10.9 FL
POCT GLUCOSE: 328 MG/DL (ref 70–110)
POCT GLUCOSE: 48 MG/DL (ref 70–110)
POTASSIUM SERPL-SCNC: 3.7 MMOL/L
PROT SERPL-MCNC: 5.5 G/DL
RBC # BLD AUTO: 4.03 M/UL
SODIUM SERPL-SCNC: 141 MMOL/L
WBC # BLD AUTO: 5.66 K/UL

## 2017-08-28 PROCEDURE — 85025 COMPLETE CBC W/AUTO DIFF WBC: CPT

## 2017-08-28 PROCEDURE — 80053 COMPREHEN METABOLIC PANEL: CPT

## 2017-08-28 PROCEDURE — G8978 MOBILITY CURRENT STATUS: HCPCS | Mod: CJ

## 2017-08-28 PROCEDURE — 11000001 HC ACUTE MED/SURG PRIVATE ROOM

## 2017-08-28 PROCEDURE — G8979 MOBILITY GOAL STATUS: HCPCS | Mod: CJ

## 2017-08-28 PROCEDURE — 83735 ASSAY OF MAGNESIUM: CPT

## 2017-08-28 PROCEDURE — 25000003 PHARM REV CODE 250: Performed by: HOSPITALIST

## 2017-08-28 PROCEDURE — 84100 ASSAY OF PHOSPHORUS: CPT

## 2017-08-28 PROCEDURE — 99233 SBSQ HOSP IP/OBS HIGH 50: CPT | Mod: ,,, | Performed by: HOSPITALIST

## 2017-08-28 PROCEDURE — 97161 PT EVAL LOW COMPLEX 20 MIN: CPT

## 2017-08-28 RX ADMIN — FUROSEMIDE 40 MG: 40 TABLET ORAL at 08:08

## 2017-08-28 RX ADMIN — ALPRAZOLAM 0.25 MG: 0.25 TABLET ORAL at 09:08

## 2017-08-28 RX ADMIN — Medication 1000 UNITS: at 08:08

## 2017-08-28 RX ADMIN — INSULIN ASPART 4 UNITS: 100 INJECTION, SOLUTION INTRAVENOUS; SUBCUTANEOUS at 01:08

## 2017-08-28 RX ADMIN — ALPRAZOLAM 0.25 MG: 0.25 TABLET ORAL at 08:08

## 2017-08-28 RX ADMIN — CLOPIDOGREL 75 MG: 75 TABLET, FILM COATED ORAL at 08:08

## 2017-08-28 RX ADMIN — ATORVASTATIN CALCIUM 40 MG: 20 TABLET, FILM COATED ORAL at 08:08

## 2017-08-28 RX ADMIN — POLYETHYLENE GLYCOL 3350 17 G: 17 POWDER, FOR SOLUTION ORAL at 05:08

## 2017-08-28 RX ADMIN — THERA TABS 1 TABLET: TAB at 08:08

## 2017-08-28 RX ADMIN — MIRTAZAPINE 15 MG: 7.5 TABLET ORAL at 09:08

## 2017-08-28 RX ADMIN — LOSARTAN POTASSIUM 25 MG: 25 TABLET, FILM COATED ORAL at 08:08

## 2017-08-28 RX ADMIN — LEVOTHYROXINE SODIUM 75 MCG: 75 TABLET ORAL at 06:08

## 2017-08-28 RX ADMIN — INSULIN ASPART 6 UNITS: 100 INJECTION, SOLUTION INTRAVENOUS; SUBCUTANEOUS at 08:08

## 2017-08-28 RX ADMIN — FUROSEMIDE 40 MG: 40 TABLET ORAL at 05:08

## 2017-08-28 RX ADMIN — INSULIN ASPART 6 UNITS: 100 INJECTION, SOLUTION INTRAVENOUS; SUBCUTANEOUS at 01:08

## 2017-08-28 RX ADMIN — ASPIRIN 81 MG: 81 TABLET, COATED ORAL at 08:08

## 2017-08-28 NOTE — PLAN OF CARE
12:14 PM  SW called [facility] to inquire if pt can be accepted. Left voicemail for Adams, admissions coordinator [phone].     Jenny Jimenez LMSW   [facility]  Ext [phone]

## 2017-08-28 NOTE — PROGRESS NOTES
[facility] Hospital Medicine  Progress Note    Patient Name: Irma Pedroza  MRN: [id number]  Patient Class: IP- Inpatient   Admission Date: 8/24/2017  Length of Stay: 4 days  Attending Physician: Darci Ha MD  Primary Care Provider: Leonidas Potts MD    [facility] Medicine Team: [facility] Darci Ha MD    Subjective:     Principal Problem:Acute on chronic combined systolic and diastolic congestive heart failure    HPI:  89 y.o. female with a history significant for CAD with recent STEMI on 8/14, no revascularization as patient is DNR and did not want invasive procedures.  She was noted to have depressed ejection fraction to 25%, was treated medically and discharged to skilled nursing.  She had dyspnea on exertion at discharge which has been progressive, orthopnea requiring elevation at night, and the last 2 nights with shortness of breath.  Tonight, she progressively became extremely short of breath and presented to ER, requiring NIPPV to maintain sats >90%.  She denies chest pain, but complains of dyspnea as above, along with some nausea at home. She is a non-smoker, but is a brittle diabetic. Denies any fever, chills, sick contacts in the nursing home. Started on BIPAP by the ER and weaned off to nasal canula after Lasix dose administered. Patient is DNR/DNI and does not want any heroic measures or invasive procedures.   ?      Hospital Course:  89 yo F with a PMH of recent STEMI, CHF with EF of 25%, presents with worsening shortness of breath and hypoxemia from skilled nursing facility. Started on Lasix IV pushes with yielded several fluids of urine and oxygen was changed to nasal canula. Insulin drip started due to Glucose >500 despite Iv insulin pushes, subsequently patient became hypoglycemic with no symptoms and started on Dextrose infusion which resolved hypoglycemia. Endocrinology consulted for aid in glucose management. SQ insulin as recommended by Endocrinology  resulted in improved glycemic control. Oxygen weaned off to room air.     Interval History: Patient seen and examined at bedside, no acute events overnight. Patient now having bowel movements and saturating well on room air. To be discharged when insurance approves of new nursing home.     Review of Systems   Constitutional: Negative for chills and fever.   HENT: Negative for congestion and rhinorrhea.    Eyes: Negative for discharge and redness.   Respiratory: Positive for shortness of breath. Negative for cough and chest tightness.    Cardiovascular: Negative for chest pain and leg swelling.   Gastrointestinal: Negative for abdominal pain, diarrhea, nausea and vomiting.   Endocrine: Negative for polydipsia and polyuria.   Genitourinary: Positive for frequency. Negative for urgency.   Musculoskeletal: Negative for arthralgias and myalgias.   Neurological: Positive for dizziness. Negative for weakness.   Psychiatric/Behavioral: Negative for agitation and sleep disturbance.     Objective:     Vital Signs (Most Recent):  Temp: 98 °F (36.7 °C) (08/28/17 0841)  Pulse: 78 (08/28/17 1100)  Resp: 18 (08/28/17 0841)  BP: (!) 115/54 (08/28/17 0841)  SpO2: (!) 92 % (08/28/17 0841) Vital Signs (24h Range):  Temp:  [97.9 °F (36.6 °C)-98.7 °F (37.1 °C)] 98 °F (36.7 °C)  Pulse:  [68-98] 78  Resp:  [18-19] 18  SpO2:  [92 %-98 %] 92 %  BP: (110-130)/(51-59) 115/54     Weight: 55.3 kg (121 lb 14.6 oz)  Body mass index is 19.68 kg/m².    Intake/Output Summary (Last 24 hours) at 08/28/17 1222  Last data filed at 08/28/17 0600   Gross per 24 hour   Intake              640 ml   Output                0 ml   Net              640 ml      Physical Exam   Constitutional: She is active and cooperative. No distress.   HENT:   Head: Normocephalic and atraumatic.   Eyes: Conjunctivae and EOM are normal.   Neck: Normal range of motion. Neck supple.   Cardiovascular: Normal rate and regular rhythm.  Exam reveals gallop (S3).    Pulmonary/Chest: No  respiratory distress. She has wheezes (diffuse). She has rales (Bibasilar).   Abdominal: Soft. She exhibits no distension. There is no tenderness. There is no guarding.   Musculoskeletal: She exhibits edema. She exhibits no tenderness.   Neurological: She is alert. No cranial nerve deficit.   Skin: Skin is warm and dry.   Psychiatric: She has a normal mood and affect. Her behavior is normal.       Significant Labs:   A1C:     Recent Labs  Lab 06/23/17  0847 08/14/17  1227   HGBA1C 10.3* 9.1*     CBC:     Recent Labs  Lab 08/27/17  0503 08/28/17  0351   WBC 6.31 5.66   HGB 13.5 12.5   HCT 40.3 36.1*    271     CMP:     Recent Labs  Lab 08/27/17  0502 08/28/17  0351    141   K 3.9 3.7    106   CO2 28 26   * 95   BUN 25 27   CREATININE 1.0 0.9   CALCIUM 8.5* 8.1*   PROT 6.0 5.5*   ALBUMIN 2.5* 2.3*   BILITOT 0.6 0.4   ALKPHOS 80 68   AST 29 23   ALT 29 25   ANIONGAP 10 9   EGFRNONAA 49.4* 56.1*     Coagulation: No results for input(s): INR, APTT in the last 48 hours.    Invalid input(s): PT  Lactic Acid:   No results for input(s): LACTATE in the last 48 hours.    Significant Imaging: I have reviewed and interpreted all pertinent imaging results/findings within the past 24 hours.    Assessment/Plan:      * Acute on chronic combined systolic and diastolic congestive heart failure    Intake and output  Daily weights   Transition Lasix to PO   Pro BNP: 2,600  Nelson placed in the ER, now removed  Seen by Cardiology who recommended holding beta blockers and diuresing  Cardiac monitor   Off supplemental oxygen, breathing on room air         Acute hypoxemic respiratory failure    Most likely due to pulmonary edema from congestive heart failure  Now resolved and patient breathing on room air           DNR (do not resuscitate)    Confirmed with patient and family   Signed copy in the chart           ST elevation myocardial infarction (STEMI)    - Continue medical therapy with ASA 81mg daily, clopidogrel  75mg daily, atorvastatin 40mg daily  - hold beta blockers for now given acute exacerbation   - Treated medically on 08/14 at [facility], no angiogram given advanced age and DNR/DNI status  - avoid hypoglycemia episodes          Uncontrolled type 2 diabetes mellitus with complication, with long-term current use of insulin    No further episodes of hypoglycemia, glucose better managed   Patient received both SQ insulin and IV insulin leading to stacking  HgbA1c: 9.1   Endocrinology consult  Due to recent hypoglycemia and history of brittle diabetes, recommend starting Levemir 20 units AC and novolog 4 units post prandial with sliding scale            VTE Risk Mitigation         Ordered     Medium Risk of VTE  Once      08/24/17 0550              Darci Ha MD  Department of Hospital Medicine   [facility]

## 2017-08-28 NOTE — SUBJECTIVE & OBJECTIVE
Interval History: Patient seen and examined at bedside, no acute events overnight. Patient now having bowel movements and saturating well on room air. To be discharged when insurance approves of new nursing home.     Review of Systems   Constitutional: Negative for chills and fever.   HENT: Negative for congestion and rhinorrhea.    Eyes: Negative for discharge and redness.   Respiratory: Positive for shortness of breath. Negative for cough and chest tightness.    Cardiovascular: Negative for chest pain and leg swelling.   Gastrointestinal: Negative for abdominal pain, diarrhea, nausea and vomiting.   Endocrine: Negative for polydipsia and polyuria.   Genitourinary: Positive for frequency. Negative for urgency.   Musculoskeletal: Negative for arthralgias and myalgias.   Neurological: Positive for dizziness. Negative for weakness.   Psychiatric/Behavioral: Negative for agitation and sleep disturbance.     Objective:     Vital Signs (Most Recent):  Temp: 98 °F (36.7 °C) (08/28/17 0841)  Pulse: 78 (08/28/17 1100)  Resp: 18 (08/28/17 0841)  BP: (!) 115/54 (08/28/17 0841)  SpO2: (!) 92 % (08/28/17 0841) Vital Signs (24h Range):  Temp:  [97.9 °F (36.6 °C)-98.7 °F (37.1 °C)] 98 °F (36.7 °C)  Pulse:  [68-98] 78  Resp:  [18-19] 18  SpO2:  [92 %-98 %] 92 %  BP: (110-130)/(51-59) 115/54     Weight: 55.3 kg (121 lb 14.6 oz)  Body mass index is 19.68 kg/m².    Intake/Output Summary (Last 24 hours) at 08/28/17 1222  Last data filed at 08/28/17 0600   Gross per 24 hour   Intake              640 ml   Output                0 ml   Net              640 ml      Physical Exam   Constitutional: She is active and cooperative. No distress.   HENT:   Head: Normocephalic and atraumatic.   Eyes: Conjunctivae and EOM are normal.   Neck: Normal range of motion. Neck supple.   Cardiovascular: Normal rate and regular rhythm.  Exam reveals gallop (S3).    Pulmonary/Chest: No respiratory distress. She has wheezes (diffuse). She has rales  (Bibasilar).   Abdominal: Soft. She exhibits no distension. There is no tenderness. There is no guarding.   Musculoskeletal: She exhibits edema. She exhibits no tenderness.   Neurological: She is alert. No cranial nerve deficit.   Skin: Skin is warm and dry.   Psychiatric: She has a normal mood and affect. Her behavior is normal.       Significant Labs:   A1C:     Recent Labs  Lab 06/23/17  0847 08/14/17  1227   HGBA1C 10.3* 9.1*     CBC:     Recent Labs  Lab 08/27/17  0503 08/28/17  0351   WBC 6.31 5.66   HGB 13.5 12.5   HCT 40.3 36.1*    271     CMP:     Recent Labs  Lab 08/27/17  0502 08/28/17  0351    141   K 3.9 3.7    106   CO2 28 26   * 95   BUN 25 27   CREATININE 1.0 0.9   CALCIUM 8.5* 8.1*   PROT 6.0 5.5*   ALBUMIN 2.5* 2.3*   BILITOT 0.6 0.4   ALKPHOS 80 68   AST 29 23   ALT 29 25   ANIONGAP 10 9   EGFRNONAA 49.4* 56.1*     Coagulation: No results for input(s): INR, APTT in the last 48 hours.    Invalid input(s): PT  Lactic Acid:   No results for input(s): LACTATE in the last 48 hours.    Significant Imaging: I have reviewed and interpreted all pertinent imaging results/findings within the past 24 hours.

## 2017-08-28 NOTE — PLAN OF CARE
Problem: Physical Therapy Goal  Goal: Physical Therapy Goal  Goals to be met by: 17     Patient will increase functional independence with mobility by performin. Supine to sit with Modified [place]  2. Sit to supine with Modified [place]  3. Sit to stand transfer with Modified [place] using Rolling Walker.   4. Gait  x 100 feet with Supervision using Rolling Walker with no LOB or SOB reported.   5. Lower extremity exercise program x15-20 reps with supervision to improve strength and endurance with functional activities.           PT evaluation completed. POC and goals established.    Malissa Almanza, PT, DPT  2017

## 2017-08-28 NOTE — PT/OT/SLP EVAL
Physical Therapy  Evaluation    Irma Pedroza   MRN: [id number]   Admitting Diagnosis: Acute on chronic combined systolic and diastolic congestive heart failure    PT Received On: 08/28/17  PT Start Time: 1048     PT Stop Time: 1104    PT Total Time (min): 16 min       Billable Minutes:  Evaluation 16     Personal factors/comorbidities: DM2; HLD. The listed co-morbidities and personal factors impact pt's current level and progress with functional mobility and independence.   Body systems elements affected: lower extremities, upper extremities, musculoskeletal system, neuromuscular system, cardiovascular, pulmonary system  Impairments: see assessment below  Clinical Presentation: stable  Functional Outcome Tools: AMPAC, MMT, ROM  Evaluation Complexity Level: low      Diagnosis: Acute on chronic combined systolic and diastolic congestive heart failure      Past Medical History:   Diagnosis Date    Anxiety     Depression     Diabetes mellitus, type 2     Hyperlipidemia     Thyroid disease       Past Surgical History:   Procedure Laterality Date    BLADDER SUSPENSION      CATARACT EXTRACTION      HYSTERECTOMY      ORIF CONGENITAL HIP DISLOCATION         Referring physician: Darci Ha MD  Date referred to PT: 08/25/17       General Precautions: Standard, fall, hearing impaired  Orthopedic Precautions: N/A   Braces: N/A       Do you have any cultural, spiritual, Cheondoism conflicts, given your current situation?: none stated    Patient History:  Lives with: alone (son and daughter-in-law live a few blocks away)  Lives in: [facility], threshold to enter  PLOF/Level of assist: Mod (I) with mobility using RW; (I) with all ADLs & self-care. Son assists with meal preparation, bring lunch over for pt during the day.   Bath: tub/shower  DME: RW    Roles/responsibilities: mother, grandmother, friend  Hobbies/Interests: going to [employer], watching tv, reading, spending time with family/friends    Assist available upon d/c:  no 24/7 assist available; son able to check on pt.   Pt and family pursuing NH placement for increase safety.       Subjective:  Communicated with RN prior to session.  Pt agreeable to PT evaluation.       Chief Complaint: none stated  Patient goals: To get stronger and return to functional independence        Objective:   Patient found with: telemetry     Cognitive Exam:  Oriented to: Person, Place, Time and Situation    Follows Commands/attention: Follows multistep  commands  Communication: clear/fluent  Safety awareness/insight to disability: intact    Physical Exam:  Postural examination/scapula alignment: Rounded shoulder, Head forward and Posterior pelvic tilt    Skin integrity: Visible skin intact  Edema: None noted     Sensation:   Intact    Upper Extremity Range of Motion:  Right Upper Extremity: WFL  Left Upper Extremity: WFL    Upper Extremity Strength:  Right Upper Extremity: WFL  Left Upper Extremity: WFL    Lower Extremity Range of Motion:  Right Lower Extremity: WFL  Left Lower Extremity: WFL    Lower Extremity Strength:  Right Lower Extremity: WFL  Left Lower Extremity: WFL     Fine motor coordination:  Intact    Gross motor coordination: WFL    Functional Mobility:  Bed Mobility:  Pt found sitting up in chair.     Transfers:   Sit to stand:Stand-by Assistance with Rolling Walker from bedside chair.         Gait:   Patient ambulated ~50 feet with Rolling Walker with Stand-by Assistance.  Pt demonstrated step-to gait with short-steps, but appeared equal bilaterally.  Demonstrated appropriate weight-shift with no LOB and appropriate foot clearance. Discontinuous steps and increase time with 180° turns.         Balance:  Static Sitting: Independent   Dynamic Sitting: Independent   Static Standing: Supervision or Set-up Assistance  Dynamic Standing: Supervision or Set-up Assistance       AM-PAC 6 CLICK MOBILITY  How much help from another person does this patient currently need?   1 = Unable,  Total/Dependent Assistance  2 = A lot, Maximum/Moderate Assistance  3 = A little, Minimum/Contact Guard/Supervision  4 = None, Modified [place]/Independent    Turning over in bed (including adjusting bedclothes, sheets and blankets)?: 4  Sitting down on and standing up from a chair with arms (e.g., wheelchair, bedside commode, etc.): 3  Moving from lying on back to sitting on the side of the bed?: 4  Moving to and from a bed to a chair (including a wheelchair)?: 3  Need to walk in hospital room?: 3  Climbing 3-5 steps with a railing?: 3  Total Score: 20     AM-PAC Raw Score CMS G-Code Modifier Level of Impairment Assistance   6 % Total / Unable   7 - 9 CM 80 - 100% Maximal Assist   10 - 14 CL 60 - 80% Moderate Assist   15 - 19 CK 40 - 60% Moderate Assist   20 - 22 CJ 20 - 40% Minimal Assist   23 CI 1-20% SBA / CGA   24 CH 0% Independent/ Mod I     Patient left up in chair with all lines intact and call button in reach.    Assessment:   Irma Pedroza is a 89 y.o. female with a medical diagnosis of Acute on chronic combined systolic and diastolic congestive heart failure and presents with decrease endurance, generalized weakness, decrease balance, and coordination limiting pt's mobility from PLOF.  Pt tolerated session well with mild SOB and fatigue after gait, no distress noted or reported.  Pt would benefit from continued skilled physical therapy for the listed impairments to improve functional independence and overall safety with mobility prior to d/c. PT recommends d/c disposition to NH (per family/pt decision) with HHPT for continued endurance and mobility.     Education:  Education provided to pt regarding: PT role/POC; safety with mobility. Verbalized understanding.     Whiteboard updated with correct mobility information. RN/PCT notified.  Appropriate to walk to bathroom or transfer to BSC with 1 person assist. Use RW for added stability. Please encourage pt to sit up during the day as  tolerated.       Rehab identified problem list/impairments: Rehab identified problem list/impairments: weakness, impaired endurance, gait instability, impaired functional mobilty, impaired balance, impaired cardiopulmonary response to activity    Rehab potential is good.    Activity tolerance: Good    Discharge recommendations: Discharge Facility/Level Of Care Needs: home health PT (per family preference and pt agreement, pt will d/c to NH, recommdend further HHPT in setting)     Barriers to discharge: Barriers to Discharge: Decreased caregiver support    Equipment recommendations: Equipment Needed After Discharge: none     GOALS:    Physical Therapy Goals        Problem: Physical Therapy Goal    Goal Priority Disciplines Outcome Goal Variances Interventions   Physical Therapy Goal     PT/OT, PT      Description:  Goals to be met by: 17     Patient will increase functional independence with mobility by performin. Supine to sit with Modified [place]  2. Sit to supine with Modified [place]  3. Sit to stand transfer with Modified [place] using Rolling Walker.   4. Gait  x 100 feet with Supervision using Rolling Walker with no LOB or SOB reported.   5. Lower extremity exercise program x15-20 reps with supervision to improve strength and endurance with functional activities.                       PLAN:    Patient to be seen 3 x/week to address the above listed problems via therapeutic activities, therapeutic exercises, gait training  Plan of Care expires: 17  Plan of Care reviewed with: patient    Functional Assessment Tool Used: ampac  Score: 20  Functional Limitation: Mobility: Walking and moving around  Mobility: Walking and Moving Around Current Status (): JESUS  Mobility: Walking and Moving Around Goal Status (): JESUS Almanza, PT  2017

## 2017-08-28 NOTE — PLAN OF CARE
"Met with pt who is sitting up in chair. imm rights given w verb back, pt states, "[facility]'m going to [facility] tomorrow."     08/28/17 7994   Right Care Assessment   Can the patient answer the patient profile reliably? Yes, cognitively intact   How often would a person be available to care for the patient? Whenever needed   Describe the patient's ability to walk at the present time. Walks with the help of equipment   How does the patient rate their overall health at the present time? Good   Number of comorbid conditions (as recorded on the chart) Two   During the past month, has the patient often been bothered by feeling down, depressed or hopeless? No   Have you felt down, depressed, or hopeless? 0   During the past month, has the patient often been bothered by little interest or pleasure in doing things? No   Have you felt little interest or pleasure in doing things? 0     "

## 2017-08-28 NOTE — PLAN OF CARE
Problem: Patient Care Overview  Goal: Plan of Care Review  Pt remains AAOx4, vital signs have been stable. Pt satting >93% on room air. No complaints of pain during shift. Pt's personal belongings and call light within reach. No falls/injuries during shift.  Plan of care reviewed with pt, all questions and concerns were addressed. Will continue to monitor

## 2017-08-28 NOTE — NURSING
Patient is AAOx4, VSS, room air. No acute changes during shift. Patient is free of falls and injury, denies pain.  Patient up to chair most of shift and worked with PT.  Safety precautions in place. BS fluctuates. WCTM.

## 2017-08-28 NOTE — PLAN OF CARE
2:46 PM  SW received call from Mrs. Lamas in admissions with [facility] in [place] who stated pt or family member must fill out application. [facility] additional clinicals [facility]. Will contact family member to have application filled out. Will f/u as needed.    Jenny Jimenez LMSW   [facility]  Ext [phone]

## 2017-08-29 ENCOUNTER — PATIENT MESSAGE (OUTPATIENT)
Dept: FAMILY MEDICINE | Facility: CLINIC | Age: 82
End: 2017-08-29

## 2017-08-29 LAB
ALBUMIN SERPL BCP-MCNC: 2.2 G/DL
ALP SERPL-CCNC: 77 U/L
ALT SERPL W/O P-5'-P-CCNC: 21 U/L
ANION GAP SERPL CALC-SCNC: 10 MMOL/L
AST SERPL-CCNC: 22 U/L
BASOPHILS # BLD AUTO: 0.01 K/UL
BASOPHILS NFR BLD: 0.2 %
BILIRUB SERPL-MCNC: 0.4 MG/DL
BUN SERPL-MCNC: 23 MG/DL
CALCIUM SERPL-MCNC: 8.4 MG/DL
CHLORIDE SERPL-SCNC: 103 MMOL/L
CO2 SERPL-SCNC: 28 MMOL/L
CREAT SERPL-MCNC: 0.9 MG/DL
DIFFERENTIAL METHOD: NORMAL
EOSINOPHIL # BLD AUTO: 0.2 K/UL
EOSINOPHIL NFR BLD: 3.3 %
ERYTHROCYTE [DISTWIDTH] IN BLOOD BY AUTOMATED COUNT: 13.6 %
EST. GFR  (AFRICAN AMERICAN): >60 ML/MIN/1.73 M^2
EST. GFR  (NON AFRICAN AMERICAN): 56.1 ML/MIN/1.73 M^2
GLUCOSE SERPL-MCNC: 192 MG/DL
HCT VFR BLD AUTO: 38 %
HGB BLD-MCNC: 12.6 G/DL
LYMPHOCYTES # BLD AUTO: 1 K/UL
LYMPHOCYTES NFR BLD: 22.8 %
MAGNESIUM SERPL-MCNC: 1.9 MG/DL
MCH RBC QN AUTO: 30.5 PG
MCHC RBC AUTO-ENTMCNC: 33.2 G/DL
MCV RBC AUTO: 92 FL
MONOCYTES # BLD AUTO: 0.5 K/UL
MONOCYTES NFR BLD: 11.6 %
NEUTROPHILS # BLD AUTO: 2.8 K/UL
NEUTROPHILS NFR BLD: 61.9 %
PHOSPHATE SERPL-MCNC: 3.7 MG/DL
PLATELET # BLD AUTO: 269 K/UL
PMV BLD AUTO: 10.7 FL
POCT GLUCOSE: 128 MG/DL (ref 70–110)
POCT GLUCOSE: 226 MG/DL (ref 70–110)
POCT GLUCOSE: 235 MG/DL (ref 70–110)
POCT GLUCOSE: 269 MG/DL (ref 70–110)
POCT GLUCOSE: 51 MG/DL (ref 70–110)
POCT GLUCOSE: 99 MG/DL (ref 70–110)
POTASSIUM SERPL-SCNC: 3.9 MMOL/L
PROT SERPL-MCNC: 5.4 G/DL
RBC # BLD AUTO: 4.13 M/UL
SODIUM SERPL-SCNC: 141 MMOL/L
WBC # BLD AUTO: 4.48 K/UL

## 2017-08-29 PROCEDURE — 80053 COMPREHEN METABOLIC PANEL: CPT

## 2017-08-29 PROCEDURE — 25000003 PHARM REV CODE 250: Performed by: HOSPITALIST

## 2017-08-29 PROCEDURE — 83735 ASSAY OF MAGNESIUM: CPT

## 2017-08-29 PROCEDURE — 84100 ASSAY OF PHOSPHORUS: CPT

## 2017-08-29 PROCEDURE — 11000001 HC ACUTE MED/SURG PRIVATE ROOM

## 2017-08-29 PROCEDURE — 97530 THERAPEUTIC ACTIVITIES: CPT

## 2017-08-29 PROCEDURE — 99232 SBSQ HOSP IP/OBS MODERATE 35: CPT | Mod: ,,, | Performed by: HOSPITALIST

## 2017-08-29 PROCEDURE — 85025 COMPLETE CBC W/AUTO DIFF WBC: CPT

## 2017-08-29 PROCEDURE — 97116 GAIT TRAINING THERAPY: CPT

## 2017-08-29 PROCEDURE — 97110 THERAPEUTIC EXERCISES: CPT

## 2017-08-29 RX ORDER — ACETAMINOPHEN 325 MG/1
650 TABLET ORAL EVERY 6 HOURS PRN
Status: DISCONTINUED | OUTPATIENT
Start: 2017-08-29 | End: 2017-08-30 | Stop reason: HOSPADM

## 2017-08-29 RX ADMIN — INSULIN ASPART 2 UNITS: 100 INJECTION, SOLUTION INTRAVENOUS; SUBCUTANEOUS at 12:08

## 2017-08-29 RX ADMIN — ACETAMINOPHEN 650 MG: 325 TABLET ORAL at 04:08

## 2017-08-29 RX ADMIN — ALPRAZOLAM 0.25 MG: 0.25 TABLET ORAL at 09:08

## 2017-08-29 RX ADMIN — INSULIN ASPART 6 UNITS: 100 INJECTION, SOLUTION INTRAVENOUS; SUBCUTANEOUS at 08:08

## 2017-08-29 RX ADMIN — Medication 16 G: at 04:08

## 2017-08-29 RX ADMIN — CLOPIDOGREL 75 MG: 75 TABLET, FILM COATED ORAL at 08:08

## 2017-08-29 RX ADMIN — LEVOTHYROXINE SODIUM 75 MCG: 75 TABLET ORAL at 06:08

## 2017-08-29 RX ADMIN — INSULIN ASPART 6 UNITS: 100 INJECTION, SOLUTION INTRAVENOUS; SUBCUTANEOUS at 12:08

## 2017-08-29 RX ADMIN — FUROSEMIDE 40 MG: 40 TABLET ORAL at 05:08

## 2017-08-29 RX ADMIN — Medication 1000 UNITS: at 08:08

## 2017-08-29 RX ADMIN — ASPIRIN 81 MG: 81 TABLET, COATED ORAL at 08:08

## 2017-08-29 RX ADMIN — INSULIN ASPART 3 UNITS: 100 INJECTION, SOLUTION INTRAVENOUS; SUBCUTANEOUS at 08:08

## 2017-08-29 RX ADMIN — ATORVASTATIN CALCIUM 40 MG: 20 TABLET, FILM COATED ORAL at 08:08

## 2017-08-29 RX ADMIN — FUROSEMIDE 40 MG: 40 TABLET ORAL at 08:08

## 2017-08-29 RX ADMIN — LOSARTAN POTASSIUM 25 MG: 25 TABLET, FILM COATED ORAL at 08:08

## 2017-08-29 RX ADMIN — INSULIN ASPART 1 UNITS: 100 INJECTION, SOLUTION INTRAVENOUS; SUBCUTANEOUS at 09:08

## 2017-08-29 RX ADMIN — MIRTAZAPINE 15 MG: 7.5 TABLET ORAL at 09:08

## 2017-08-29 RX ADMIN — ALPRAZOLAM 0.25 MG: 0.25 TABLET ORAL at 08:08

## 2017-08-29 RX ADMIN — THERA TABS 1 TABLET: TAB at 08:08

## 2017-08-29 NOTE — PT/OT/SLP PROGRESS
"Occupational Therapy  Treatment    Irma Pedroza   MRN: [id number]   Admitting Diagnosis: Acute on chronic combined systolic and diastolic congestive heart failure    OT Date of Treatment: 08/29/17   OT Start Time: 1438  OT Stop Time: 1456  OT Total Time (min): 18 min    Billable Minutes:  Therapeutic Exercise 18    General Precautions: Standard, fall  Orthopedic Precautions:    Braces:           Subjective:  Communicated with RN prior to session.  Pain/Comfort  Pain Rating 1: 0/10    Objective:  Patient found with: telemetry     Functional Mobility:  Bed Mobility:  Supine to Sit:  (Sitting up in chair.)    Transfers:   Sit <> Stand Assistance: Activity did not occur    Functional Ambulation: Pt declined stating she just walked with PT.    Balance:   Static Sit: GOOD+: Takes MAXIMAL challenges from all directions.    Dynamic Sit: GOOD+: Maintains balance through MAXIMAL excursions of active trunk motion    Therapeutic Activities and Exercises:  From chair level, completed BUE AROM with 3 lb dowel and yellow theraband including:  - shld flexion/extension  - elbow flexion/extension  - Hor ABD/ADD  - IR/ER  All 2 sets of 10.     AM-PAC 6 CLICK ADL   How much help from another person does this patient currently need?   1 = Unable, Total/Dependent Assistance  2 = A lot, Maximum/Moderate Assistance  3 = A little, Minimum/Contact Guard/Supervision  4 = None, Modified [place]/Independent    Putting on and taking off regular lower body clothing? : 3  Bathing (including washing, rinsing, drying)?: 3  Toileting, which includes using toilet, bedpan, or urinal? : 3  Putting on and taking off regular upper body clothing?: 3  Taking care of personal grooming such as brushing teeth?: 3  Eating meals?: 4  Total Score: 19     AM-PAC Raw Score CMS "G-Code Modifier Level of Impairment Assistance   6 % Total / Unable   7 - 8 CM 80 - 100% Maximal Assist   9-13 CL 60 - 80% Moderate Assist   14 - 19 CK 40 - 60% Moderate Assist "   20 - 22 CJ 20 - 40% Minimal Assist   23 CI 1-20% SBA / CGA   24 CH 0% Independent/ Mod I       Patient left up in chair with call button in reach and family present    ASSESSMENT:  Irma Pedroza is a 89 y.o. female with a medical diagnosis of Acute on chronic combined systolic and diastolic congestive heart failure and is progressing well towards goals and would benefit from continued services to improve strength, endurance and (I) with ADLs so that she can return home functioning at her PLOF.    Rehab identified problem list/impairments: Rehab identified problem list/impairments: weakness, impaired endurance, impaired functional mobilty, impaired self care skills    Rehab potential is good.    Activity tolerance: Good    Discharge recommendations: Discharge Facility/Level Of Care Needs: nursing facility, skilled     Barriers to discharge: Barriers to Discharge: Decreased caregiver support    Equipment recommendations: none     GOALS:    Occupational Therapy Goals        Problem: Occupational Therapy Goal    Goal Priority Disciplines Outcome Interventions   Occupational Therapy Goal     OT, PT/OT Ongoing (interventions implemented as appropriate)    Description:  Goals to be met by: 09/03/17     Patient will increase functional independence with ADLs by performing:    UE Dressing with Modified [place].  LE Dressing with Modified [place].  Grooming while standing with Modified [place].  Toileting from bedside commode with Modified [place] for hygiene and clothing management.   Toilet transfer to bedside commode with Modified [place].  Upper extremity exercise program x15 reps per handout, with independence.                      Plan:  Patient to be seen 5 x/week to address the above listed problems via self-care/home management, therapeutic activities, therapeutic exercises  Plan of Care expires: 09/24/17  Plan of Care reviewed with: patient         ERIK Sierra  08/29/2017

## 2017-08-29 NOTE — PROGRESS NOTES
Progress Note  Hospital Medicine    Primary Team[place] [facility] [place] [facility]  Admit Date: 8/24/2017   Length of Stay:  LOS: 5 days   SUBJECTIVE:   Reason for Admission:  Acute on chronic combined systolic and diastolic congestive heart failure    HPI:  HPI:  89 y.o. female with a history significant for CAD with recent STEMI on 8/14, no revascularization as patient is DNR and did not want invasive procedures.  She was noted to have depressed ejection fraction to 25%, was treated medically and discharged to skilled nursing.  She had dyspnea on exertion at discharge which has been progressive, orthopnea requiring elevation at night, and the last 2 nights with shortness of breath.  Tonight, she progressively became extremely short of breath and presented to ER, requiring NIPPV to maintain sats >90%.  She denies chest pain, but complains of dyspnea as above, along with some nausea at home. She is a non-smoker, but is a brittle diabetic. Denies any fever, chills, sick contacts in the nursing home. Started on BIPAP by the ER and weaned off to nasal canula after Lasix dose administered. Patient is DNR/DNI and does not want any heroic measures or invasive procedures.   ?  Hospital Course:  89 yo F with a PMH of recent STEMI, CHF with EF of 25%, presents with worsening shortness of breath and hypoxemia from skilled nursing facility. Started on Lasix IV pushes with yielded several fluids of urine and oxygen was changed to nasal canula. Insulin drip started due to Glucose >500 despite Iv insulin pushes, subsequently patient became hypoglycemic with no symptoms and started on Dextrose infusion which resolved hypoglycemia. Endocrinology consulted for aid in glucose management. SQ insulin as recommended by Endocrinology resulted in improved glycemic control. Oxygen weaned off to room air.     Interval history:    No acute events overnight.  Pt feels well, breathing comfortably on RA.    Review of Systems:  Constitutional: no fever or  chills  Respiratory: no cough or shortness of breath  Cardiovascular: no chest pain or palpitations  Gastrointestinal: no nausea or vomiting, no abdominal pain or change in bowel habits     OBJECTIVE:     Temp:  [97.7 °F (36.5 °C)-98.7 °F (37.1 °C)]   Pulse:  [69-89]   Resp:  [16-18]   BP: (105-119)/(54-65)   SpO2:  [92 %-98 %]  Body mass index is 19.68 kg/m².  Intake/Outake:  This Shift:  No intake/output data recorded.    Net I/O past 24h:     Intake/Output Summary (Last 24 hours) at 08/29/17 1041  Last data filed at 08/29/17 0600   Gross per 24 hour   Intake              460 ml   Output                0 ml   Net              460 ml             Physical Exam:  Gen- well-developed, well-nourished, NAD  CVS- S1 and S2 present, RRR  Resp- CTA b/l, no work of breathing  Abd- BS+, soft, NT, ND  Ext- no clubbing, cyanosis, or edema    Laboratory:  CBC/Anemia Labs: Coags:      Recent Labs  Lab 08/27/17  0503 08/28/17  0351 08/29/17  0422   WBC 6.31 5.66 4.48   HGB 13.5 12.5 12.6   HCT 40.3 36.1* 38.0    271 269   MCV 93 90 92   RDW 13.6 13.6 13.6    No results for input(s): INR, APTT in the last 168 hours.    Invalid input(s): PT     Chemistries:     Recent Labs  Lab 08/27/17  0502 08/28/17  0351 08/29/17  0422    141 141   K 3.9 3.7 3.9    106 103   CO2 28 26 28   BUN 25 27 23   CREATININE 1.0 0.9 0.9   CALCIUM 8.5* 8.1* 8.4*   PROT 6.0 5.5* 5.4*   BILITOT 0.6 0.4 0.4   ALKPHOS 80 68 77   ALT 29 25 21   AST 29 23 22   MG 1.8 1.9 1.9   PHOS 2.8 3.6 3.7        ABG:     Recent Labs  Lab 08/24/17  0600 08/24/17  0725   PH 7.286* 7.309*   PCO2 44.7 44.2   PO2 112* 42   HCO3 21.3* 22.2*   POCSATURATED 98 72*   BE -5 -4        Cardiac Enzymes: Ejection Fractions:    No results for input(s): CPK, CPKMB, MB, TROPONINI in the last 72 hours. EF   Date Value Ref Range Status   08/14/2017 25 (A) 55 - 65         POCT Glucose: HbA1c:      Recent Labs  Lab 08/27/17  1747 08/27/17  2048 08/28/17  1312 08/28/17  1746  08/28/17  2153 08/29/17  0816   POCTGLUCOSE 84 139* 328* 48* 99 269*    Hemoglobin A1C   Date Value Ref Range Status   08/14/2017 9.1 (H) 4.0 - 5.6 % Final     Comment:     According to ADA guidelines, hemoglobin A1c <7.0% represents  optimal control in non-pregnant diabetic patients. Different  metrics may apply to specific patient populations.   Standards of Medical Care in Diabetes-2016.  For the purpose of screening for the presence of diabetes:  <5.7%     Consistent with the absence of diabetes  5.7-6.4%  Consistent with increasing risk for diabetes   (prediabetes)  >or=6.5%  Consistent with diabetes  Currently, no consensus exists for use of hemoglobin A1c  for diagnosis of diabetes for children.  This Hemoglobin A1c assay has significant interference with fetal   hemoglobin   (HbF). The results are invalid for patients with abnormal amounts of   HbF,   including those with known Hereditary Persistence   of Fetal Hemoglobin. Heterozygous hemoglobin variants (HbAS, HbAC,   HbAD, HbAE, HbA2) do not significantly interfere with this assay;   however, presence of multiple variants in a sample may impact the %   interference.     06/23/2017 10.3 (H) 4.0 - 5.6 % Final     Comment:     According to ADA guidelines, hemoglobin A1c <7.0% represents  optimal control in non-pregnant diabetic patients. Different  metrics may apply to specific patient populations.   Standards of Medical Care in Diabetes-2016.  For the purpose of screening for the presence of diabetes:  <5.7%     Consistent with the absence of diabetes  5.7-6.4%  Consistent with increasing risk for diabetes   (prediabetes)  >or=6.5%  Consistent with diabetes  Currently, no consensus exists for use of hemoglobin A1c  for diagnosis of diabetes for children.  This Hemoglobin A1c assay has significant interference with fetal   hemoglobin   (HbF). The results are invalid for patients with abnormal amounts of   HbF,   including those with known Hereditary  Persistence   of Fetal Hemoglobin. Heterozygous hemoglobin variants (HbAS, HbAC,   HbAD, HbAE, HbA2) do not significantly interfere with this assay;   however, presence of multiple variants in a sample may impact the %   interference.     12/16/2016 9.4 (H) 4.5 - 6.2 % Final     Comment:     According to ADA guidelines, hemoglobin A1C <7.0% represents  optimal control in non-pregnant diabetic patients.  Different  metrics may apply to specific populations.   Standards of Medical Care in Diabetes - 2016.  For the purpose of screening for the presence of diabetes:  <5.7%     Consistent with the absence of diabetes  5.7-6.4%  Consistent with increasing risk for diabetes   (prediabetes)  >or=6.5%  Consistent with diabetes  Currently no consensus exists for use of hemoglobin A1C  for diagnosis of diabetes for children.           Medications:  Scheduled Meds:   acetaminophen  650 mg Oral Once    alprazolam  0.25 mg Oral BID    aspirin  81 mg Oral Daily    atorvastatin  40 mg Oral Daily    clopidogrel  75 mg Oral Daily    furosemide  40 mg Oral BID    insulin aspart  6 Units Subcutaneous TIDWM    insulin detemir  17 Units Subcutaneous Daily    levothyroxine  75 mcg Oral QAM    losartan  25 mg Oral Daily    mirtazapine  15 mg Oral QHS    multivitamin  1 tablet Oral Daily    vitamin D  1,000 Units Oral Daily                             Continuous Infusions:   PRN Meds:.dextrose 50%, dextrose 50%, glucagon (human recombinant), glucose, glucose, insulin aspart, polyethylene glycol     ASSESSMENT/PLAN:     Acute on chronic combined systolic and diastolic congestive heart failure     Intake and output  Daily weights   Transition Lasix to PO   Pro BNP: 2,600  Seen by Cardiology who recommended holding beta blockers and diuresing; now euvolemic, will resume Toprol XL 12.5mg daily tomorrow  Cardiac monitor   Off supplemental oxygen, breathing on room air        Acute hypoxemic respiratory failure     Most likely due to  pulmonary edema from congestive heart failure  Now resolved and patient breathing on room air           DNR (do not resuscitate)     Confirmed with patient and family by prior staff  Signed copy in the chart           ST elevation myocardial infarction (STEMI)     - Continue medical therapy with ASA 81mg daily, clopidogrel 75mg daily, atorvastatin 40mg daily  - resume Toprol  - Treated medically on 08/14 at [facility], no angiogram given advanced age and DNR/DNI status  - avoid hypoglycemia episodes          Uncontrolled type 2 diabetes mellitus with complication, with long-term current use of insulin     No further episodes of hypoglycemia, glucose better managed   Patient received both SQ insulin and IV insulin leading to stacking  HgbA1c: 9.1   Endocrinology consult  Due to recent hypoglycemia and history of brittle diabetes, recommend continuing Levemir 16 units AC and novolog 6 units post prandial with sliding scale and titrate accordingly       DVT ppx- SCD  CODE Status- DNR    [facility]    Carolyn Malhotra MD  Hospital Medicine Staff

## 2017-08-29 NOTE — PLAN OF CARE
Problem: Patient Care Overview  Goal: Plan of Care Review  Outcome: Ongoing (interventions implemented as appropriate)  Plan of care reviewed with pt.  Understanding verbalized.  Pt alert and oriented x 4.  Afebrile.  VSS.  No complaints of pain throughout shift.  Blood glucose monitored, no corrective insulin administered.  Pt compliant with 1500 ml fluid restriction.  Up with assistance.  Bedside commode at bedside.  Adequate urine output.  Fall precautions in place.  Bed alarm set.  Bed in lowest position.  Call light and bedside table within reach.  Safety maintained throughout shift.

## 2017-08-29 NOTE — PLAN OF CARE
Problem: Patient Care Overview  Goal: Plan of Care Review  Outcome: Ongoing (interventions implemented as appropriate)  POC reviewed w/ pt. Verbalizes understanding. BGL management ongoing w/ appropriate orders and protocols. Pt asymptomatic. Functional, elimination and ADLs maintained at baseline. Assisted OOB to chair and bedside commode. Fall risk precautions maintained. No significant events occurred on shift. Pt is observed sitting in chair, watching TV.

## 2017-08-29 NOTE — PROGRESS NOTES
At dinner time, BGL assessed at 51 mg/dl. Pt is asymptomatic, requesting assistance w/ toileting. Glucose tablets administered per order and BGL reassessed at 128 mg/dl s/p interventions. Pt is now observed,\ alert and awake, sitting in recliner chair at bedside and watching TV/engaging w/ staff. No distress noted.

## 2017-08-29 NOTE — DISCHARGE SUMMARY
[facility]  Department of Hospital Medicine  Discharge Summary      Patient Name: Irma Pedroza  MRN: [id number]  Admission Date: 8/17/2017  Hospital Length of Stay: 7 days  Discharge Date and Time: 8/24/2017  5:27 AM  Attending Physician: Milton Julian MD  Discharging Provider: Beverly Masterson NP  Primary Care Provider: Leonidas Potts MD    Chief Complaint/Reason for Admission: Debility    History of Present Illness:  Patient is a 89 y.o. female with DM2, hypothyroidism, HLP who presents to SNF after hospitalization at [facility] for STEMI with shock after developing N/V and malaise.  The patient did not wish for invasive therapy and was made DNR.  She was started on heparin and norepinephrine drip and now DAPT, high dose statin. She did receive amiodarone drip due to NSVT on telemetry.  The patient as confirmed by her daughter wishes to be DNR.  She denies any chest pain, N/V or dyspnea.  She also denies any musculoskeletal pain.  She has bee sleeping poorly and wishes to resume her lorazepam as she takes at home.  Her daughter has brought her Tradjenta which will be resumed. The patient has been admitted to SNF for ongoing PT/OT due to insufficient progress to go home safely from the hospital.    Hospital Course:   The patient was admitted at [facility] from 8/14 to 8/17/2017.  8/17/17: Patient admitted to SNF for ongoing PT/OT following a hospitalization for STEMI.  8/21/17: Patient seen at bedside, denies chest pain, labs reviewed  8/23/17: Patient sob overnight, placed on O2 given predinsone and resp treatments. This AM patient sitting on side of bed bathing with mild dyspnea, O2 in use, reports feeling better than last night. CXR with pulmonary edema, BNP 2200s, patient on Lasix, Patient this afternoon sitting on side of bed without O2 with normal resp rate. Patient reports being comfortable.  8/24/17: Patient became SOB overnight, telemedicine was used by on call provider and  nursing staff was instructed to send patient to ED.     Significant Diagnostic Studies:   Results for RAMÍREZ RAMIREZ (MRN [id number]) as of 8/29/2017 15:09   Ref. Range 8/20/2017 21:25 8/21/2017 04:33   WBC Latest Ref Range: 3.90 - 12.70 K/uL  6.50   RBC Latest Ref Range: 4.00 - 5.40 M/uL  4.14   Hemoglobin Latest Ref Range: 12.0 - 16.0 g/dL  12.7   Hematocrit Latest Ref Range: 37.0 - 48.5 %  39.1   MCV Latest Ref Range: 82 - 98 fL  94   MCH Latest Ref Range: 27.0 - 31.0 pg  30.7   MCHC Latest Ref Range: 32.0 - 36.0 g/dL  32.5   RDW Latest Ref Range: 11.5 - 14.5 %  13.4   Platelets Latest Ref Range: 150 - 350 K/uL  207   MPV Latest Ref Range: 9.2 - 12.9 fL  11.8   Gran% Latest Ref Range: 38.0 - 73.0 %  76.7 (H)   Gran # Latest Ref Range: 1.8 - 7.7 K/uL  5.0   Lymph% Latest Ref Range: 18.0 - 48.0 %  11.7 (L)   Lymph # Latest Ref Range: 1.0 - 4.8 K/uL  0.8 (L)   Mono% Latest Ref Range: 4.0 - 15.0 %  10.8   Mono # Latest Ref Range: 0.3 - 1.0 K/uL  0.7   Eosinophil% Latest Ref Range: 0.0 - 8.0 %  0.6   Eos # Latest Ref Range: 0.0 - 0.5 K/uL  0.0   Basophil% Latest Ref Range: 0.0 - 1.9 %  0.2   Baso # Latest Ref Range: 0.00 - 0.20 K/uL  0.01   Sodium Latest Ref Range: 136 - 145 mmol/L  141   Potassium Latest Ref Range: 3.5 - 5.1 mmol/L  3.8   Chloride Latest Ref Range: 95 - 110 mmol/L  107   CO2 Latest Ref Range: 23 - 29 mmol/L  26   Anion Gap Latest Ref Range: 8 - 16 mmol/L  8   BUN, Bld Latest Ref Range: 10 - 30 mg/dL  23   Creatinine Latest Ref Range: 0.5 - 1.4 mg/dL  0.9   eGFR if non African American Latest Ref Range: >60 mL/min/1.73 m^2  56.1 (A)   eGFR if African American Latest Ref Range: >60 mL/min/1.73 m^2  >60.0   Glucose Latest Ref Range: 70 - 110 mg/dL  34 (LL)   Calcium Latest Ref Range: 8.7 - 10.5 mg/dL  8.7   Phosphorus Latest Ref Range: 2.7 - 4.5 mg/dL  4.5   Magnesium Latest Ref Range: 1.6 - 2.6 mg/dL  1.9     Results for RAMÍREZ RAMIREZ (MRN [id number]) as of 8/29/2017 15:09   Ref. Range 8/22/2017 05:21    Alkaline Phosphatase Latest Ref Range: 55 - 135 U/L 76   Total Protein Latest Ref Range: 6.0 - 8.4 g/dL 5.9 (L)   Albumin Latest Ref Range: 3.5 - 5.2 g/dL 2.6 (L)   Total Bilirubin Latest Ref Range: 0.1 - 1.0 mg/dL 0.4   Bilirubin, Direct Latest Ref Range: 0.1 - 0.3 mg/dL 0.2   AST Latest Ref Range: 10 - 40 U/L 30   ALT Latest Ref Range: 10 - 44 U/L 37       Final Active Diagnoses:    Diagnosis Date Noted POA    PRINCIPAL PROBLEM:  Debility [R53.81] 08/18/2017 Yes    Chronic combined systolic and diastolic heart failure [I50.42] 08/18/2017 Yes    DNR (do not resuscitate) [Z66] 08/18/2017 Yes    ST elevation myocardial infarction (STEMI) [I21.3] 08/14/2017 Yes    Uncontrolled type 2 diabetes mellitus with complication, with long-term current use of insulin [E11.8, E11.65, Z79.4] 11/08/2014 Yes    Anxiety [F41.9] 11/08/2014 Yes      Problems Resolved During this Admission:    Diagnosis Date Noted Date Resolved POA      * Debility    -continue PT/OT to increase ambulation, ADL performance and endurance  -continue enoxaparin for DVT prophylaxis  -continue fall precautions  -continue miralax as she takes at home to prevent constipation; hold for frequent or loose stooling        DNR (do not resuscitate)    -Discussed with patient and daughter  -Paperwork completed.         Chronic combined systolic and diastolic heart failure    -Decompensated   -SOB, requires O2  -CXR with pulmonary edema  -BNP 2,292  -started on lasix  -Continue medical therapy with metoprolol and resume losartan as BP tolerates, SBP in low 100s continue to hold lasartan  -Monitor with daily weights        ST elevation myocardial infarction (STEMI)    -No further N/V  -Continue medical therapy with ASA 81mg daily, clopidogrel 75mg daily, atorvastatin 40mg daily, metoprolol 12.5mg daily  -Continue GI prophylaxis with pantoprazole        Uncontrolled type 2 diabetes mellitus with complication, with long-term current use of insulin    -Poorly  controlled  -Continue tradjenta with levemir and SSNI prn hyperglycemia, decreased levemir today from 30 to 27 daily  -check 2am blood sugar  -Continue diabetic diet  -will continue to monitor and adjust regimen as necessary  -Patient given prednisone overnight for SOB, now BG >400, will monitor        Anxiety    -Patient chronically on lorazepam and alprazolam at home  -Hold alprazolam as patient without any anxious symptoms currently  -Will continue to monitor.            Discharged Condition: fair    Disposition: Short Term Hospital    Medications:  Transfer Medications (for Discharge Readmit only):   No current facility-administered medications for this encounter.      No current outpatient prescriptions on file.     Facility-Administered Medications Ordered in Other Encounters   Medication Dose Route Frequency Provider Last Rate Last Dose    acetaminophen tablet 650 mg  650 mg Oral Once Silvia Shahid Jr., PA-C   Stopped at 08/26/17 0000    alprazolam tablet 0.25 mg  0.25 mg Oral BID Darci Ha MD   0.25 mg at 08/29/17 0819    aspirin EC tablet 81 mg  81 mg Oral Daily Darci Ha MD   81 mg at 08/29/17 0819    atorvastatin tablet 40 mg  40 mg Oral Daily Darci Ha MD   40 mg at 08/29/17 0818    clopidogrel tablet 75 mg  75 mg Oral Daily Darci Ha MD   75 mg at 08/29/17 0818    dextrose 50% injection 12.5 g  12.5 g Intravenous PRN Darci Ha MD        dextrose 50% injection 25 g  25 g Intravenous PRN Darci Ha MD   25 g at 08/25/17 0549    furosemide tablet 40 mg  40 mg Oral BID Darci Ha MD   40 mg at 08/29/17 0819    glucagon (human recombinant) injection 1 mg  1 mg Intramuscular PRN Darci Ha MD        glucose chewable tablet 16 g  16 g Oral PRN Darci Ha MD        glucose chewable tablet 24 g  24 g Oral PRN Darci Ha MD        insulin aspart pen 0-5 Units  0-5 Units Subcutaneous QID (AC + HS) PRN Darci Ha MD   2 Units  at 08/29/17 1218    insulin aspart pen 6 Units  6 Units Subcutaneous TIDWM Darci Ha MD   6 Units at 08/29/17 1217    insulin detemir pen 17 Units  17 Units Subcutaneous Daily Darci Ha MD   17 Units at 08/29/17 0819    levothyroxine tablet 75 mcg  75 mcg Oral QAM Darci Ha MD   75 mcg at 08/29/17 0633    losartan tablet 25 mg  25 mg Oral Daily Darci Ha MD   25 mg at 08/29/17 0819    [START ON 8/30/2017] metoprolol succinate (TOPROL-XL) 24 hr split tablet 12.5 mg  12.5 mg Oral Daily Carolyn Malhotra MD        mirtazapine tablet 15 mg  15 mg Oral QHS Darci Ha MD   15 mg at 08/28/17 1761    multivitamin tablet 1 tablet  1 tablet Oral Daily Darci Ha MD   1 tablet at 08/29/17 0819    polyethylene glycol packet 17 g  17 g Oral BID PRN Darci Ha MD   17 g at 08/28/17 1759    vitamin D 1000 units tablet 1,000 Units  1,000 Units Oral Daily Darci Ha MD   1,000 Units at 08/29/17 0823     Time spent on the discharge of patient: 10 minutes    Beverly Masterson NP  Department of Hospital Medicine  [facility]

## 2017-08-29 NOTE — PLAN OF CARE
Problem: Physical Therapy Goal  Goal: Physical Therapy Goal  Goals to be met by: 17     Patient will increase functional independence with mobility by performin. Supine to sit with Modified [place]  2. Sit to supine with Modified [place]  3. Sit to stand transfer with Modified [place] using Rolling Walker.   4. Gait  x 100 feet with Supervision using Rolling Walker with no LOB or SOB reported.   5. Lower extremity exercise program x15-20 reps with supervision to improve strength and endurance with functional activities.      Goals remain appropriate at time. Continue with PT POC as indicated.

## 2017-08-29 NOTE — PLAN OF CARE
8:39 AM  SW called pt's daughter in law to indicate if application has been filled out. Daughter in law stated application has been filled out and pt's son is br[facility].     Jenny Jimenez, ZEHRA   [facility]  Ext [phone]

## 2017-08-29 NOTE — PT/OT/SLP PROGRESS
Physical Therapy  Treatment    Irma Pedroza   MRN: [id number]   Admitting Diagnosis: Acute on chronic combined systolic and diastolic congestive heart failure    PT Received On: 08/29/17  PT Start Time: 1357     PT Stop Time: 1420    PT Total Time (min): 23 min       Billable Minutes:  Gait Training8 and Therapeutic Activity 15    Treatment Type: Treatment  PT/PTA: PTA     PTA Visit Number: 1       General Precautions: Standard, hearing impaired, fall  Orthopedic Precautions: N/A   Braces: N/A    Do you have any cultural, spiritual, Rastafari conflicts, given your current situation?: none stated    Subjective:  Communicated with nursing prior to session.  Pt was willing to participate with therapy.     Pain/Comfort  Pain Rating 1: 0/10  Pain Rating Post-Intervention 1: 0/10    Objective:   Patient found with: telemetry    Functional Mobility:  Bed Mobility: Not performed 2/2 pt found and left UIC.       Transfers:  Sit <> Stand Assistance: Stand By Assistance  Sit <> Stand Assistive Device: Rolling Walker  Toilet Transfer Technique: Stand Pivot (bedside chair<>BSC)  Toilet Transfer Assistance: Contact Guard Assistance  Toilet Transfer Assistive Device: No Assistive Device    Gait:   Gait Distance:  (~80 ft. Cueing for RW management. )  Assistance 1: Stand by Assistance  Gait Assistive Device: Rolling walker  Gait Deviation(s): decreased kamille, decreased step length, decreased stride length, toes first    Therapeutic Activities and Exercises:  Pt stood at sink for ~1 min while brushing hair with CGA for balance and safety.      Pt performed B LE therex x20 reps: AP, LAQ, Hip Flexion, and GS    AM-PAC 6 CLICK MOBILITY  How much help from another person does this patient currently need?   1 = Unable, Total/Dependent Assistance  2 = A lot, Maximum/Moderate Assistance  3 = A little, Minimum/Contact Guard/Supervision  4 = None, Modified [place]/Independent    Turning over in bed (including adjusting bedclothes,  sheets and blankets)?: 4  Sitting down on and standing up from a chair with arms (e.g., wheelchair, bedside commode, etc.): 3  Moving from lying on back to sitting on the side of the bed?: 4  Moving to and from a bed to a chair (including a wheelchair)?: 3  Need to walk in hospital room?: 3  Climbing 3-5 steps with a railing?: 3  Total Score: 20    AM-PAC Raw Score CMS G-Code Modifier Level of Impairment Assistance   6 % Total / Unable   7 - 9 CM 80 - 100% Maximal Assist   10 - 14 CL 60 - 80% Moderate Assist   15 - 19 CK 40 - 60% Moderate Assist   20 - 22 CJ 20 - 40% Minimal Assist   23 CI 1-20% SBA / CGA   24 CH 0% Independent/ Mod I     Patient left up in chair with all lines intact and call button in reach.    Assessment:  Irma Pedroza is a 89 y.o. female with a medical diagnosis of Acute on chronic combined systolic and diastolic congestive heart failure and presents with all deficits noted below. Pt tolerated treatment well, and will continue to benefit from PT intervention at this time. Continue with PT POC as indicated.    Rehab identified problem list/impairments: Rehab identified problem list/impairments: weakness, impaired endurance, gait instability, impaired functional mobilty, impaired balance, impaired cardiopulmonary response to activity    Rehab potential is good.    Activity tolerance: Good    Discharge recommendations: Discharge Facility/Level Of Care Needs: nursing facility, skilled NH     Barriers to discharge: Barriers to Discharge: Decreased caregiver support    Equipment recommendations: Equipment Needed After Discharge: none     GOALS:    Physical Therapy Goals        Problem: Physical Therapy Goal    Goal Priority Disciplines Outcome Goal Variances Interventions   Physical Therapy Goal     PT/OT, PT      Description:  Goals to be met by: 17     Patient will increase functional independence with mobility by performin. Supine to sit with Modified [place]  2. Sit to  supine with Modified [place]  3. Sit to stand transfer with Modified [place] using Rolling Walker.   4. Gait  x 100 feet with Supervision using Rolling Walker with no LOB or SOB reported.   5. Lower extremity exercise program x15-20 reps with supervision to improve strength and endurance with functional activities.                       PLAN:    Patient to be seen 3 x/week  to address the above listed problems via therapeutic activities, therapeutic exercises, gait training  Plan of Care expires: 09/27/17  Plan of Care reviewed with: patient         Inge Raquel, PTA  08/29/2017

## 2017-08-29 NOTE — PLAN OF CARE
Problem: Occupational Therapy Goal  Goal: Occupational Therapy Goal  Goals to be met by: 09/03/17     Patient will increase functional independence with ADLs by performing:    UE Dressing with Modified [place].  LE Dressing with Modified [place].  Grooming while standing with Modified [place].  Toileting from bedside commode with Modified [place] for hygiene and clothing management.   Toilet transfer to bedside commode with Modified [place].  Upper extremity exercise program x15 reps per handout, with independence.     Outcome: Ongoing (interventions implemented as appropriate)  Con't with POC.    ERIK Sierra

## 2017-08-30 VITALS
HEIGHT: 66 IN | RESPIRATION RATE: 18 BRPM | OXYGEN SATURATION: 94 % | BODY MASS INDEX: 19.74 KG/M2 | HEART RATE: 74 BPM | SYSTOLIC BLOOD PRESSURE: 132 MMHG | WEIGHT: 122.81 LBS | DIASTOLIC BLOOD PRESSURE: 57 MMHG | TEMPERATURE: 98 F

## 2017-08-30 LAB
ALBUMIN SERPL BCP-MCNC: 2.3 G/DL
ALP SERPL-CCNC: 74 U/L
ALT SERPL W/O P-5'-P-CCNC: 22 U/L
ANION GAP SERPL CALC-SCNC: 6 MMOL/L
AST SERPL-CCNC: 23 U/L
BACTERIA BLD CULT: NORMAL
BASOPHILS # BLD AUTO: 0.01 K/UL
BASOPHILS NFR BLD: 0.2 %
BILIRUB SERPL-MCNC: 0.3 MG/DL
BUN SERPL-MCNC: 25 MG/DL
CALCIUM SERPL-MCNC: 8.6 MG/DL
CHLORIDE SERPL-SCNC: 105 MMOL/L
CO2 SERPL-SCNC: 29 MMOL/L
CREAT SERPL-MCNC: 1 MG/DL
DIFFERENTIAL METHOD: NORMAL
EOSINOPHIL # BLD AUTO: 0.2 K/UL
EOSINOPHIL NFR BLD: 3.1 %
ERYTHROCYTE [DISTWIDTH] IN BLOOD BY AUTOMATED COUNT: 13.5 %
EST. GFR  (AFRICAN AMERICAN): 56.9 ML/MIN/1.73 M^2
EST. GFR  (NON AFRICAN AMERICAN): 49.4 ML/MIN/1.73 M^2
GLUCOSE SERPL-MCNC: 217 MG/DL
HCT VFR BLD AUTO: 37 %
HGB BLD-MCNC: 12.5 G/DL
LYMPHOCYTES # BLD AUTO: 1.5 K/UL
LYMPHOCYTES NFR BLD: 30.1 %
MAGNESIUM SERPL-MCNC: 2 MG/DL
MCH RBC QN AUTO: 30.1 PG
MCHC RBC AUTO-ENTMCNC: 33.8 G/DL
MCV RBC AUTO: 89 FL
MONOCYTES # BLD AUTO: 0.5 K/UL
MONOCYTES NFR BLD: 8.9 %
NEUTROPHILS # BLD AUTO: 2.9 K/UL
NEUTROPHILS NFR BLD: 57.5 %
PHOSPHATE SERPL-MCNC: 3.9 MG/DL
PLATELET # BLD AUTO: 262 K/UL
PMV BLD AUTO: 10.7 FL
POCT GLUCOSE: 264 MG/DL (ref 70–110)
POTASSIUM SERPL-SCNC: 3.5 MMOL/L
PROT SERPL-MCNC: 5.6 G/DL
RBC # BLD AUTO: 4.15 M/UL
SODIUM SERPL-SCNC: 140 MMOL/L
WBC # BLD AUTO: 5.08 K/UL

## 2017-08-30 PROCEDURE — 80053 COMPREHEN METABOLIC PANEL: CPT

## 2017-08-30 PROCEDURE — 25000003 PHARM REV CODE 250: Performed by: HOSPITALIST

## 2017-08-30 PROCEDURE — 85025 COMPLETE CBC W/AUTO DIFF WBC: CPT

## 2017-08-30 PROCEDURE — 36415 COLL VENOUS BLD VENIPUNCTURE: CPT

## 2017-08-30 PROCEDURE — 83735 ASSAY OF MAGNESIUM: CPT

## 2017-08-30 PROCEDURE — 99238 HOSP IP/OBS DSCHRG MGMT 30/<: CPT | Mod: ,,, | Performed by: HOSPITALIST

## 2017-08-30 PROCEDURE — 84100 ASSAY OF PHOSPHORUS: CPT

## 2017-08-30 RX ORDER — FUROSEMIDE 40 MG/1
40 TABLET ORAL 2 TIMES DAILY
Qty: 60 TABLET | Refills: 1
Start: 2017-08-30 | End: 2018-08-30

## 2017-08-30 RX ORDER — INSULIN GLARGINE 100 [IU]/ML
INJECTION, SOLUTION SUBCUTANEOUS
Qty: 45 ML | Refills: 11
Start: 2017-08-30 | End: 2017-11-16 | Stop reason: SDUPTHER

## 2017-08-30 RX ADMIN — LEVOTHYROXINE SODIUM 75 MCG: 75 TABLET ORAL at 06:08

## 2017-08-30 RX ADMIN — THERA TABS 1 TABLET: TAB at 08:08

## 2017-08-30 RX ADMIN — METOPROLOL SUCCINATE 12.5 MG: 25 TABLET, EXTENDED RELEASE ORAL at 08:08

## 2017-08-30 RX ADMIN — LOSARTAN POTASSIUM 25 MG: 25 TABLET, FILM COATED ORAL at 08:08

## 2017-08-30 RX ADMIN — FUROSEMIDE 40 MG: 40 TABLET ORAL at 08:08

## 2017-08-30 RX ADMIN — Medication 1000 UNITS: at 08:08

## 2017-08-30 RX ADMIN — CLOPIDOGREL 75 MG: 75 TABLET, FILM COATED ORAL at 08:08

## 2017-08-30 RX ADMIN — ALPRAZOLAM 0.25 MG: 0.25 TABLET ORAL at 08:08

## 2017-08-30 RX ADMIN — INSULIN ASPART 3 UNITS: 100 INJECTION, SOLUTION INTRAVENOUS; SUBCUTANEOUS at 08:08

## 2017-08-30 RX ADMIN — ASPIRIN 81 MG: 81 TABLET, COATED ORAL at 08:08

## 2017-08-30 RX ADMIN — ATORVASTATIN CALCIUM 40 MG: 20 TABLET, FILM COATED ORAL at 08:08

## 2017-08-30 RX ADMIN — INSULIN ASPART 6 UNITS: 100 INJECTION, SOLUTION INTRAVENOUS; SUBCUTANEOUS at 08:08

## 2017-08-30 NOTE — NURSING
Pt discharging to [facility] in [place], SW has all notes on who to call report, and transportation has been set up with hospitals for 11:15am.

## 2017-08-30 NOTE — PLAN OF CARE
9:47 AM  SW received SNF orders for pt to be discharged to [facility] in [place], [place]. Faxed orders to Vanna Lamas at [facility]. Spoke with Vanna Lamas this morning who stated pt is ready to be received. Arranged transportation with [facility]AN[facility]. SPD will arrive 11:15AM.     Nurse to call report to [facility] on Wing 2 [phone]. Pt will be going to Room 219A.     Informed RN, Sofya.    Jenny Jimenez, ZEHRA   [facility]  Ext [phone]

## 2017-08-30 NOTE — PLAN OF CARE
Problem: Patient Care Overview  Goal: Plan of Care Review  Outcome: Ongoing (interventions implemented as appropriate)  No acute events occurred overnight.  Pt's O2 sat was 88% when checked.  2L nasal cannula applied.  Unable to titrate O2 at each vital check.  Accuchecks continued AC/HS- coverage given as needed.  Pt up with one assist to bedside commode.  No falls or injuries occurred.  Will continue to monitor.

## 2017-08-30 NOTE — PLAN OF CARE
[facility]     Department of Hospital Medicine     [address] [place] [address]     [phone] after hours  [phone] fax       NURSING HOME ORDERS    08/30/2017    Admit to Nursing Home:   Skilled Bed                                              Diagnoses:  Active Hospital Problems    Diagnosis  POA    *Acute on chronic combined systolic and diastolic congestive heart failure [I50.43]  Yes    Acute hypoxemic respiratory failure [J96.01]  No    DNR (do not resuscitate) [Z66]  Yes    ST elevation myocardial infarction (STEMI) [I21.3]  Yes    Uncontrolled type 2 diabetes mellitus with complication, with long-term current use of insulin [E11.8, E11.65, Z79.4]  Not Applicable      Resolved Hospital Problems    Diagnosis Date Resolved POA    Non-traumatic rhabdomyolysis [M62.82] 08/24/2017 Yes       Patient is homebound due to:  Acute on chronic combined systolic and diastolic congestive heart failure    Allergies:Review of patient's allergies indicates:  No Known Allergies    Vitals:   Every shift (Skilled Nursing patients)    Diet: Cardiac, Diabetic, 1500cc/day     Activities:      - Up in a chair each morning as tolerated    LABS:  Per facility protocol   CMP, CBC each month for 3 months   TSH every year    Nursing Precautions:    - Fall precautions per nursing home protocol    CONSULTS:      Physical Therapy to evaluate and treat     Occupational Therapy to evaluate and treat                   DIABETES CARE:    Check blood sugar:       Fingerstick blood sugar AC and HS   Fingerstick blood sugar every 6 hours if unable to eat      Report CBG < 60 or > 400 to physician.                                          Insulin Sliding Scale          Glucose  Novolog Insulin Subcutaneous        0 - 60   Orange juice or glucose tablet, hold insulin      No insulin   201-250  2 units   251-300  4 units   301-350  6 units   351-400  8 units   >400   10 units  then call physician      Medications: Discontinue all previous medication orders, if any. See new list below.   Irma Pedroza   Home Medication Instructions MARIANO:[id number]    Printed on:08/30/17 0938   Medication Information                      alprazolam (XANAX) 0.25 MG tablet  TAKE 1 TABLET BY MOUTH TWICE DAILY AS NEEDED FOR ANXIETY             aspirin (ECOTRIN) 81 MG EC tablet  Take 1 tablet (81 mg total) by mouth once daily.             atorvastatin (LIPITOR) 40 MG tablet  Take 1 tablet (40 mg total) by mouth once daily.             clopidogrel (PLAVIX) 75 mg tablet  Take 1 tablet (75 mg total) by mouth once daily.             CONTOUR TEST STRIPS Strp  USE TO TEST THREE TIMES DAILY             furosemide (LASIX) 40 MG tablet  Take 1 tablet (40 mg total) by mouth 2 (two) times daily.             insulin glargine (LANTUS SOLOSTAR) 100 unit/mL (3 mL) InPn pen  INJECT 17 UNITS EVERY MORNING             levothyroxine (SYNTHROID) 75 MCG tablet  TAKE 1 TABLET BY MOUTH EVERY MORNING             lorazepam (ATIVAN) 1 MG tablet  TAKE 1 TO 2 TABLETS BY MOUTH EVERY NIGHT AT BEDTIME             losartan (COZAAR) 50 MG tablet  TAKE 1/2 TO 1 TABLET BY MOUTH EVERY DAY             metoprolol succinate (TOPROL-XL) 25 MG 24 hr tablet  Take 0.5 tablets (12.5 mg total) by mouth once daily.             mirtazapine (REMERON) 15 MG tablet  Take 15 mg by mouth every evening.             multivitamin capsule  Take 1 capsule by mouth once daily.             TRADJENTA 5 mg Tab tablet  TAKE 1 TABLET BY MOUTH DAILY             vitamin D 1000 units Tab  Take 2,000 Units by mouth once daily.                        _________________________________  Carolyn Malhotra MD  08/30/2017

## 2017-08-30 NOTE — NURSING
Report called to Sara at [facility] in [place], [place]. All questions answered, provided Sara with family contact information. Patient updated on discharge.

## 2017-08-30 NOTE — PLAN OF CARE
Heriberto Pedroza Son     [phone]   Spoke w pt's son re: dc plan for today to [facility]. He has spoken w Ms. Lamas and is in agreement w dc plan. Medicare rights explained to pt and (son over phone). Both verb understanding. W/c rosalinda piper per MSW.     08/30/17 1008   Final Note   Assessment Type Final Discharge Note   Discharge Disposition SNF   Discharge planning education complete? Yes   Hospital Follow Up  Appt(s) scheduled? Yes   Discharge plans and expectations educations in teach back method with documentation complete? Yes   Offered [facility] -- Bedside Delivery? n/a   Discharge/Hospital Encounter Summary to ([facility]) PCP n/a   Referral to Outpatient Case Management complete? n/a   Referral to / orders for Home Health Complete? n/a   30 day supply of medicines given at discharge, if documented non-compliance / non-adherence? n/a   Any social issues identified prior to discharge? No   Did you assess the readiness or willingness of the family or caregiver to support self management of care? Yes   Right Care Referral Info   Post Acute Recommendation SNF / Sub-Acute Rehab        08/30/17 1008   Final Note   Assessment Type Final Discharge Note   Discharge Disposition SNF   Discharge planning education complete? Yes   Hospital Follow Up  Appt(s) scheduled? Yes   Discharge plans and expectations educations in teach back method with documentation complete? Yes   Offered [facility] -- Bedside Delivery? n/a   Discharge/Hospital Encounter Summary to ([facility]) PCP n/a   Referral to Outpatient Case Management complete? n/a   Referral to / orders for Home Health Complete? n/a   30 day supply of medicines given at discharge, if documented non-compliance / non-adherence? n/a   Any social issues identified prior to discharge? No   Did you assess the readiness or willingness of the family or caregiver to support self management of care? Yes   Right Care Referral Info   Post Acute Recommendation  SNF / Sub-Acute Rehab

## 2017-08-30 NOTE — PROGRESS NOTES
Progress Note  Hospital Medicine    Primary Team: [facility] MED [facility]  Admit Date: 8/24/2017   Length of Stay:  LOS: 6 days   SUBJECTIVE:   Reason for Admission:  Acute on chronic combined systolic and diastolic congestive heart failure    HPI:  HPI:  89 y.o. female with a history significant for CAD with recent STEMI on 8/14, no revascularization as patient is DNR and did not want invasive procedures.  She was noted to have depressed ejection fraction to 25%, was treated medically and discharged to skilled nursing.  She had dyspnea on exertion at discharge which has been progressive, orthopnea requiring elevation at night, and the last 2 nights with shortness of breath.  Tonight, she progressively became extremely short of breath and presented to ER, requiring NIPPV to maintain sats >90%.  She denies chest pain, but complains of dyspnea as above, along with some nausea at home. She is a non-smoker, but is a brittle diabetic. Denies any fever, chills, sick contacts in the nursing home. Started on BIPAP by the ER and weaned off to nasal canula after Lasix dose administered. Patient is DNR/DNI and does not want any heroic measures or invasive procedures.   ?  Hospital Course:  89 yo F with a PMH of recent STEMI, CHF with EF of 25%, presents with worsening shortness of breath and hypoxemia from skilled nursing facility. Started on Lasix IV pushes with yielded several fluids of urine and oxygen was changed to nasal canula. Insulin drip started due to Glucose >500 despite Iv insulin pushes, subsequently patient became hypoglycemic with no symptoms and started on Dextrose infusion which resolved hypoglycemia. Endocrinology consulted for aid in glucose management. SQ insulin as recommended by Endocrinology resulted in improved glycemic control. Oxygen weaned off to room air.     Interval history:    No acute events overnight.  Pt without complaints and breathing comfortably on room air.    Review of Systems:  Constitutional: no  fever or chills  Respiratory: no cough or shortness of breath  Cardiovascular: no chest pain or palpitations  Gastrointestinal: no nausea or vomiting, no abdominal pain or change in bowel habits     OBJECTIVE:     Temp:  [97.7 °F (36.5 °C)-98.6 °F (37 °C)]   Pulse:  [64-87]   Resp:  [18]   BP: (106-132)/(52-58)   SpO2:  [88 %-96 %]  Body mass index is 19.82 kg/m².  Intake/Outake:  This Shift:  I/O this shift:  In: 330 [P.O.:330]  Out: -     Net I/O past 24h:     Intake/Output Summary (Last 24 hours) at 08/30/17 1041  Last data filed at 08/30/17 0903   Gross per 24 hour   Intake             1633 ml   Output             1000 ml   Net              633 ml             Physical Exam:  Gen- well-developed, well-nourished, NAD  CVS- S1 and S2 present, RRR  Resp- CTA b/l, no work of breathing  Abd- BS+, soft, NT, ND  Ext- no clubbing, cyanosis, or edema    Laboratory:  CBC/Anemia Labs: Coags:      Recent Labs  Lab 08/28/17  0351 08/29/17  0422 08/30/17  0407   WBC 5.66 4.48 5.08   HGB 12.5 12.6 12.5   HCT 36.1* 38.0 37.0    269 262   MCV 90 92 89   RDW 13.6 13.6 13.5    No results for input(s): INR, APTT in the last 168 hours.    Invalid input(s): PT     Chemistries:     Recent Labs  Lab 08/28/17  0351 08/29/17  0422 08/30/17  0407    141 140   K 3.7 3.9 3.5    103 105   CO2 26 28 29   BUN 27 23 25   CREATININE 0.9 0.9 1.0   CALCIUM 8.1* 8.4* 8.6*   PROT 5.5* 5.4* 5.6*   BILITOT 0.4 0.4 0.3   ALKPHOS 68 77 74   ALT 25 21 22   AST 23 22 23   MG 1.9 1.9 2.0   PHOS 3.6 3.7 3.9        ABG:     Recent Labs  Lab 08/24/17  0600 08/24/17  0725   PH 7.286* 7.309*   PCO2 44.7 44.2   PO2 112* 42   HCO3 21.3* 22.2*   POCSATURATED 98 72*   BE -5 -4        Cardiac Enzymes: Ejection Fractions:    No results for input(s): CPK, CPKMB, MB, TROPONINI in the last 72 hours. EF   Date Value Ref Range Status   08/14/2017 25 (A) 55 - 65         POCT Glucose: HbA1c:      Recent Labs  Lab 08/28/17  2153 08/29/17  0816  08/29/17  1216 08/29/17  1634 08/29/17  1747 08/29/17  2148   POCTGLUCOSE 99 269* 235* 51* 128* 226*    Hemoglobin A1C   Date Value Ref Range Status   08/14/2017 9.1 (H) 4.0 - 5.6 % Final     Comment:     According to ADA guidelines, hemoglobin A1c <7.0% represents  optimal control in non-pregnant diabetic patients. Different  metrics may apply to specific patient populations.   Standards of Medical Care in Diabetes-2016.  For the purpose of screening for the presence of diabetes:  <5.7%     Consistent with the absence of diabetes  5.7-6.4%  Consistent with increasing risk for diabetes   (prediabetes)  >or=6.5%  Consistent with diabetes  Currently, no consensus exists for use of hemoglobin A1c  for diagnosis of diabetes for children.  This Hemoglobin A1c assay has significant interference with fetal   hemoglobin   (HbF). The results are invalid for patients with abnormal amounts of   HbF,   including those with known Hereditary Persistence   of Fetal Hemoglobin. Heterozygous hemoglobin variants (HbAS, HbAC,   HbAD, HbAE, HbA2) do not significantly interfere with this assay;   however, presence of multiple variants in a sample may impact the %   interference.     06/23/2017 10.3 (H) 4.0 - 5.6 % Final     Comment:     According to ADA guidelines, hemoglobin A1c <7.0% represents  optimal control in non-pregnant diabetic patients. Different  metrics may apply to specific patient populations.   Standards of Medical Care in Diabetes-2016.  For the purpose of screening for the presence of diabetes:  <5.7%     Consistent with the absence of diabetes  5.7-6.4%  Consistent with increasing risk for diabetes   (prediabetes)  >or=6.5%  Consistent with diabetes  Currently, no consensus exists for use of hemoglobin A1c  for diagnosis of diabetes for children.  This Hemoglobin A1c assay has significant interference with fetal   hemoglobin   (HbF). The results are invalid for patients with abnormal amounts of   HbF,   including  those with known Hereditary Persistence   of Fetal Hemoglobin. Heterozygous hemoglobin variants (HbAS, HbAC,   HbAD, HbAE, HbA2) do not significantly interfere with this assay;   however, presence of multiple variants in a sample may impact the %   interference.     12/16/2016 9.4 (H) 4.5 - 6.2 % Final     Comment:     According to ADA guidelines, hemoglobin A1C <7.0% represents  optimal control in non-pregnant diabetic patients.  Different  metrics may apply to specific populations.   Standards of Medical Care in Diabetes - 2016.  For the purpose of screening for the presence of diabetes:  <5.7%     Consistent with the absence of diabetes  5.7-6.4%  Consistent with increasing risk for diabetes   (prediabetes)  >or=6.5%  Consistent with diabetes  Currently no consensus exists for use of hemoglobin A1C  for diagnosis of diabetes for children.           Medications:  Scheduled Meds:   acetaminophen  650 mg Oral Once    alprazolam  0.25 mg Oral BID    aspirin  81 mg Oral Daily    atorvastatin  40 mg Oral Daily    clopidogrel  75 mg Oral Daily    furosemide  40 mg Oral BID    insulin aspart  6 Units Subcutaneous TIDWM    insulin detemir  17 Units Subcutaneous Daily    levothyroxine  75 mcg Oral QAM    losartan  25 mg Oral Daily    metoprolol succinate  12.5 mg Oral Daily    mirtazapine  15 mg Oral QHS    multivitamin  1 tablet Oral Daily    vitamin D  1,000 Units Oral Daily                             Continuous Infusions:   PRN Meds:.acetaminophen, dextrose 50%, dextrose 50%, glucagon (human recombinant), glucose, glucose, insulin aspart, polyethylene glycol     ASSESSMENT/PLAN:     Acute on chronic combined systolic and diastolic congestive heart failure     Intake and output  Daily weights   Transitioned Lasix to PO and doing well  Pro BNP: 2,600  Seen by Cardiology who recommended holding beta blockers and diuresing; now euvolemic, resumed Toprol XL 12.5mg daily today  Cardiac monitor   Off  supplemental oxygen, breathing on room air        Acute hypoxemic respiratory failure     Most likely due to pulmonary edema from congestive heart failure  Now resolved and patient breathing on room air           DNR (do not resuscitate)     Confirmed with patient and family by prior staff  Signed copy in the chart           ST elevation myocardial infarction (STEMI)     - Continue medical therapy with ASA 81mg daily, clopidogrel 75mg daily, atorvastatin 40mg daily  - resume Toprol  - Treated medically on 08/14 at [place], no angiogram given advanced age and DNR/DNI status  - avoid hypoglycemia episodes          Uncontrolled type 2 diabetes mellitus with complication, with long-term current use of insulin     No further episodes of hypoglycemia, glucose better managed   Patient received both SQ insulin and IV insulin leading to stacking  HgbA1c: 9.1   Endocrinology consult-  Due to recent hypoglycemia and history of brittle diabetes, recommend continuing Levemir 16 units AC and novolog 6 units post prandial with sliding scale and titrate accordingly.  Will resume home regimen upon discharge       DVT ppx- SCD  CODE Status- DNR    [facility]    Carolyn Malhotra MD  Hospital Medicine Staff

## 2017-08-30 NOTE — NURSING
SPD to bedside, patient able to ambulate to wheel chair with stand by assistance. All belongings removed from room. Telemetry d/c'd. No PIV to remove. [facility], family picked up most of belongings yesterday.

## 2017-09-01 ENCOUNTER — PATIENT OUTREACH (OUTPATIENT)
Dept: ADMINISTRATIVE | Facility: CLINIC | Age: 82
End: 2017-09-01

## 2017-09-05 ENCOUNTER — TELEPHONE (OUTPATIENT)
Dept: FAMILY MEDICINE | Facility: CLINIC | Age: 82
End: 2017-09-05

## 2017-09-05 NOTE — TELEPHONE ENCOUNTER
Dr Potts I would like to know what you ordered for my mother in reference to how many times she is to be tested for blood sugar, she told me three times a day before meals but the nurses tell twice a day at 6am and 4 pm.  The reason I am asking is her blood sugar count is still high into the 400s.  Any assistance would be appreciated. thanks Heriberto         This is from the pt son, Heriberto Miramontesxelmarcella.

## 2017-09-06 ENCOUNTER — OUTSIDE PLACE OF SERVICE (OUTPATIENT)
Dept: ADMINISTRATIVE | Facility: OTHER | Age: 82
End: 2017-09-06

## 2017-09-06 NOTE — DISCHARGE SUMMARY
DISCHARGE SUMMARY  Hospital Medicine    [place] [facility]    Patient Name: Irma Pedroza  YOB: 1925    Admit Date: 8/24/2017    Discharge Date: 8/30/2017    Discharge Attending Physician: Carolyn Malhotra MD    Principal Diagnoses:  Active Hospital Problems    Diagnosis  POA    *Acute on chronic combined systolic and diastolic congestive heart failure [I50.43]  Yes    Acute hypoxemic respiratory failure [J96.01]  No    DNR (do not resuscitate) [Z66]  Yes    ST elevation myocardial infarction (STEMI) [I21.3]  Yes    Uncontrolled type 2 diabetes mellitus with complication, with long-term current use of insulin [E11.8, E11.65, Z79.4]  Not Applicable      Resolved Hospital Problems    Diagnosis Date Resolved POA    Non-traumatic rhabdomyolysis [M62.82] 08/24/2017 Yes       Discharged Condition: stable    HOSPITAL COURSE:      Initial Presentation:    89 y.o. female with a history significant for CAD with recent STEMI on 8/14, no revascularization as patient is DNR and did not want invasive procedures.  She was noted to have depressed ejection fraction to 25%, was treated medically and discharged to skilled nursing.  She had dyspnea on exertion at discharge which has been progressive, orthopnea requiring elevation at night, and the last 2 nights with shortness of breath.  Tonight, she progressively became extremely short of breath and presented to ER, requiring NIPPV to maintain sats >90%.  She denies chest pain, but complains of dyspnea as above, along with some nausea at home. She is a non-smoker, but is a brittle diabetic. Denies any fever, chills, sick contacts in the nursing home. Started on BIPAP by the ER and weaned off to nasal canula after Lasix dose administered. Patient is DNR/DNI and does not want any heroic measures or invasive procedures.   ?    Course of Principle Problem for Admission:    Started on Lasix IV pushes with yielded several fluids of urine and oxygen was changed to  nasal canula. Insulin drip started due to Glucose >500 despite Iv insulin pushes, subsequently patient became hypoglycemic with no symptoms and started on Dextrose infusion which resolved hypoglycemia. Endocrinology consulted for aid in glucose management. SQ insulin as recommended by Endocrinology resulted in improved glycemic control. Oxygen weaned off to room air.     Acute on chronic combined systolic and diastolic congestive heart failure     Intake and output  Daily weights   Transitioned Lasix to PO and doing well  Pro BNP: 2,600  Seen by Cardiology who recommended holding beta blockers and diuresing; now euvolemic, resumed Toprol XL 12.5mg daily today  Cardiac monitor   Off supplemental oxygen, breathing on room air          Other Medical Problems Addressed in the Hospital:    Acute hypoxemic respiratory failure     Most likely due to pulmonary edema from congestive heart failure  Now resolved and patient breathing on room air      ST elevation myocardial infarction (STEMI)     - Continue medical therapy with ASA 81mg daily, clopidogrel 75mg daily, atorvastatin 40mg daily  - resume Toprol  - Treated medically on 08/14 at [place], no angiogram given advanced age and DNR/DNI status  - avoid hypoglycemia episodes          Uncontrolled type 2 diabetes mellitus with complication, with long-term current use of insulin     No further episodes of hypoglycemia, glucose better managed   Patient received both SQ insulin and IV insulin leading to stacking  HgbA1c: 9.1   Endocrinology consult-  Due to recent hypoglycemia and history of brittle diabetes, recommend continuing Levemir 16 units AC and novolog 6 units post prandial with sliding scale and titrate accordingly.  Will resume home regimen upon discharge           Consults: Pulmonary/Intensive care    Last CBC/BMP:    CBC/Anemia Labs: Coags:    No results for input(s): WBC, HGB, HCT, PLT, MCV, RDW, IRON, FERRITIN, RETIC, FOLATE, [id number], OCCULTBLOOD in the last  168 hours.    Invalid input(s): IRONSATURATED No results for input(s): INR, APTT in the last 168 hours.    Invalid input(s): PT     Chemistries:   No results for input(s): NA, K, CL, CO2, BUN, CREATININE, CALCIUM, PROT, BILITOT, ALKPHOS, ALT, AST, GLUCOSE, MG, PHOS in the last 168 hours.    Invalid input(s): LABALBU     Significant Diagnostic Studies: as above    Special Treatments/Procedures:   * No surgery found *     Disposition: Skilled Nursing Facility    Discharge Medication List:       Irma Pedroza ADALID   Home Medication Instructions MARIANO:[id number]    Printed on:09/06/17 1410   Medication Information                      alprazolam (XANAX) 0.25 MG tablet  TAKE 1 TABLET BY MOUTH TWICE DAILY AS NEEDED FOR ANXIETY             aspirin (ECOTRIN) 81 MG EC tablet  Take 1 tablet (81 mg total) by mouth once daily.             atorvastatin (LIPITOR) 40 MG tablet  Take 1 tablet (40 mg total) by mouth once daily.             clopidogrel (PLAVIX) 75 mg tablet  Take 1 tablet (75 mg total) by mouth once daily.             CONTOUR TEST STRIPS Strp  USE TO TEST THREE TIMES DAILY             furosemide (LASIX) 40 MG tablet  Take 1 tablet (40 mg total) by mouth 2 (two) times daily.             insulin glargine (LANTUS SOLOSTAR) 100 unit/mL (3 mL) InPn pen  INJECT 17 UNITS EVERY MORNING             levothyroxine (SYNTHROID) 75 MCG tablet  TAKE 1 TABLET BY MOUTH EVERY MORNING             lorazepam (ATIVAN) 1 MG tablet  TAKE 1 TO 2 TABLETS BY MOUTH EVERY NIGHT AT BEDTIME             losartan (COZAAR) 50 MG tablet  TAKE 1/2 TO 1 TABLET BY MOUTH EVERY DAY             metoprolol succinate (TOPROL-XL) 25 MG 24 hr tablet  Take 0.5 tablets (12.5 mg total) by mouth once daily.             mirtazapine (REMERON) 15 MG tablet  Take 15 mg by mouth every evening.             multivitamin capsule  Take 1 capsule by mouth once daily.             TRADJENTA 5 mg Tab tablet  TAKE 1 TABLET BY MOUTH DAILY             vitamin D 1000 units Tab  Take  2,000 Units by mouth once daily.                  Patient Instructions:    Discharge Procedure Orders  Diet Cardiac     Activity as tolerated     Call MD for:  difficulty breathing or increased cough         At the time of discharge patient was told to take all medications as prescribed, to keep all followup appointments, and to call their primary care physician or return to the emergency room if they have any worsening or concerning symptoms.    Signing Physician:  Carolyn Malhotra MD

## 2017-09-07 ENCOUNTER — PATIENT MESSAGE (OUTPATIENT)
Dept: FAMILY MEDICINE | Facility: CLINIC | Age: 82
End: 2017-09-07

## 2017-09-07 DIAGNOSIS — R53.81 DEBILITY: Primary | ICD-10-CM

## 2017-09-07 DIAGNOSIS — I50.9 CONGESTIVE HEART FAILURE, UNSPECIFIED CONGESTIVE HEART FAILURE CHRONICITY, UNSPECIFIED CONGESTIVE HEART FAILURE TYPE: ICD-10-CM

## 2017-09-08 ENCOUNTER — PATIENT MESSAGE (OUTPATIENT)
Dept: FAMILY MEDICINE | Facility: CLINIC | Age: 82
End: 2017-09-08

## 2017-09-11 ENCOUNTER — OUTSIDE PLACE OF SERVICE (OUTPATIENT)
Dept: ADMINISTRATIVE | Facility: OTHER | Age: 82
End: 2017-09-11

## 2017-09-12 ENCOUNTER — PATIENT MESSAGE (OUTPATIENT)
Dept: FAMILY MEDICINE | Facility: CLINIC | Age: 82
End: 2017-09-12

## 2017-09-13 ENCOUNTER — PATIENT OUTREACH (OUTPATIENT)
Dept: ADMINISTRATIVE | Facility: CLINIC | Age: 82
End: 2017-09-13

## 2017-10-24 NOTE — PT/OT/SLP DISCHARGE
Occupational Therapy Discharge Summary    Irma Pedroza  MRN: [id number]   ST elevation myocardial infarction (STEMI)   Patient Discharged from acute Occupational Therapy on 8/30/17.  Please refer to prior OT note dated on 8/29/17 for functional status.     Assessment:   Patient appropriate for care in another setting.  GOALS:    Occupational Therapy Goals     Not on file              Reasons for Discontinuation of Therapy Services  Transfer to alternate level of care.      Plan:  Patient Discharged to: Skilled Nursing Facility.

## 2017-11-16 RX ORDER — INSULIN GLARGINE 100 [IU]/ML
INJECTION, SOLUTION SUBCUTANEOUS
Qty: 15 ML | Refills: 11 | Status: SHIPPED | OUTPATIENT
Start: 2017-11-16

## 2018-03-18 ENCOUNTER — PATIENT MESSAGE (OUTPATIENT)
Dept: FAMILY MEDICINE | Facility: CLINIC | Age: 83
End: 2018-03-18

## 2018-09-13 NOTE — SUBJECTIVE & OBJECTIVE
Review of Systems   Constitution: Negative for diaphoresis and malaise/fatigue.   HENT: Negative.    Eyes: Negative.    Cardiovascular: Negative for chest pain, leg swelling, near-syncope, orthopnea, palpitations and paroxysmal nocturnal dyspnea.   Respiratory: Negative.    Endocrine: Negative.    Hematologic/Lymphatic: Negative.    Skin: Negative.    Musculoskeletal: Negative.    Gastrointestinal: Negative.  Negative for nausea and vomiting.   Genitourinary: Negative.    Neurological: Negative.    Psychiatric/Behavioral: Negative.  Negative for altered mental status.   Allergic/Immunologic: Negative.      Objective:     Vital Signs (Most Recent):  Temp: 98.9 °F (37.2 °C) (08/17/17 0821)  Pulse: 66 (08/17/17 0821)  Resp: 16 (08/17/17 0821)  BP: (!) 117/58 (08/17/17 0821)  SpO2: 95 % (08/17/17 0402) Vital Signs (24h Range):  Temp:  [98.1 °F (36.7 °C)-99.3 °F (37.4 °C)] 98.9 °F (37.2 °C)  Pulse:  [64-77] 66  Resp:  [16-24] 16  SpO2:  [94 %-96 %] 95 %  BP: (107-120)/(51-58) 117/58     Weight: 54.5 kg (120 lb 2.4 oz)  Body mass index is 21.98 kg/m².     SpO2: 95 %  O2 Device (Oxygen Therapy): room air      Intake/Output Summary (Last 24 hours) at 08/17/17 1057  Last data filed at 08/17/17 0649   Gross per 24 hour   Intake              856 ml   Output             1950 ml   Net            -1094 ml       Lines/Drains/Airways     Peripheral Intravenous Line                 Peripheral IV - Single Lumen 08/14/17 1230 Right Antecubital 2 days                Physical Exam   Constitutional: She is oriented to person, place, and time. She appears well-developed and well-nourished. No distress.   HENT:   Head: Normocephalic and atraumatic.   Eyes: Right eye exhibits no discharge. Left eye exhibits no discharge.   Neck: No JVD present.   Cardiovascular: Normal rate and regular rhythm.    Murmur heard.   Harsh midsystolic murmur is present with a grade of 3/6  at the upper right sternal border radiating to the  neck  Pulmonary/Chest: Effort normal and breath sounds normal.   Abdominal: Soft. Bowel sounds are normal.   Musculoskeletal: She exhibits no edema.   Neurological: She is alert and oriented to person, place, and time.   Skin: Skin is warm and dry. She is not diaphoretic.   Psychiatric: She has a normal mood and affect. Her behavior is normal. Judgment and thought content normal.       Significant Labs:   BMP:   No results for input(s): GLU, NA, K, CL, CO2, BUN, CREATININE, CALCIUM, MG in the last 48 hours., CMP   No results for input(s): NA, K, CL, CO2, GLU, BUN, CREATININE, CALCIUM, PROT, ALBUMIN, BILITOT, ALKPHOS, AST, ALT, ANIONGAP, ESTGFRAFRICA, EGFRNONAA in the last 48 hours., CBC   No results for input(s): WBC, HGB, HCT, PLT in the last 48 hours., INR   No results for input(s): INR, PROTIME in the last 48 hours., Lipid Panel   No results for input(s): CHOL, HDL, LDLCALC, TRIG, CHOLHDL in the last 48 hours., Troponin   No results for input(s): TROPONINI in the last 48 hours. and All pertinent lab results from the last 24 hours have been reviewed.    Significant Imaging: Echocardiogram:   2D echo with color flow doppler:   Results for orders placed or performed during the hospital encounter of 08/14/17   2D echo with color flow doppler   Result Value Ref Range    EF 25 (A) 55 - 65    Mitral Valve Regurgitation MILD     Diastolic Dysfunction Yes (A)     Aortic Valve Regurgitation MODERATE (A)     Est. PA Systolic Pressure 51.16 (A)     Mitral Valve Mobility NORMAL     Tricuspid Valve Regurgitation TRIVIAL       anticipated discharge recommendation/functional limitations in following categories/impairments found

## 2022-03-01 NOTE — ED NOTES
Dr. Sandra at bedside   Doxycycline Pregnancy And Lactation Text: This medication is Pregnancy Category D and not consider safe during pregnancy. It is also excreted in breast milk but is considered safe for shorter treatment courses.